# Patient Record
Sex: MALE | Race: BLACK OR AFRICAN AMERICAN | Employment: FULL TIME | URBAN - METROPOLITAN AREA
[De-identification: names, ages, dates, MRNs, and addresses within clinical notes are randomized per-mention and may not be internally consistent; named-entity substitution may affect disease eponyms.]

---

## 2018-06-26 ENCOUNTER — HOSPITAL ENCOUNTER (OUTPATIENT)
Dept: SURGERY | Age: 48
Discharge: HOME | End: 2018-06-26

## 2018-06-28 VITALS — BODY MASS INDEX: 36.65 KG/M2 | WEIGHT: 220 LBS | HEIGHT: 65 IN

## 2018-06-28 RX ORDER — LOSARTAN POTASSIUM AND HYDROCHLOROTHIAZIDE 12.5; 1 MG/1; MG/1
1 TABLET ORAL DAILY
COMMUNITY

## 2018-06-28 NOTE — PERIOP NOTES
Pt returned call . Reports still at work, requests RN call his wife to obtain medication information. This RN called pt's wife Grace Felix). Per wife pt takes Losartan/Hctz 100/12.5 daily for blood pressure control. Instructed wife to have pt continue medication up until the DOS- hold losartan/hctz DOS.

## 2018-06-28 NOTE — PERIOP NOTES
Patient verified name, , and surgery as listed in [facility]. Type 1B surgery, PAT phone assessment complete. Orders were received. Labs per surgeon: none  Labs per anesthesia protocol: none     Pt is at work and will call this afternoon with the name of his blood pressure medication. He is unsure of the name of medication. Patient answered medical/surgical history questions at their best of ability. All prior to admission medications documented in [facility]. Patient instructed to take the following medications the day of surgery according to anesthesia guidelines with a small sip of water: none . Hold all vitamins 7 days prior to surgery and NSAIDS 5 days prior to surgery. Medications to be held- none. Patient instructed on the following:  Arrive at A Entrance, time of arrival to be called the day before by 1700  NPO after midnight including gum, mints, and ice chips  Responsible adult must drive patient to the hospital, stay during surgery, and patient will need supervision 24 hours after anesthesia  Use antibacterial soap in shower the night before surgery and on the morning of surgery  Leave all valuables (money and jewelry) at home but bring insurance card and ID on  DOS  Do not wear make-up, nail polish, lotions, cologne, perfumes, powders, or oil on skin. Patient teach back successful and patient demonstrates knowledge of instruction.

## 2018-06-28 NOTE — PERIOP NOTES
Called pt back and instructed him that he needs to come to out pt lab prior to DOS to have lab work (K+) prior to surgery.

## 2018-06-29 ENCOUNTER — HOSPITAL ENCOUNTER (OUTPATIENT)
Dept: LAB | Age: 48
Discharge: HOME | End: 2018-06-29
Attending: ORTHOPAEDIC SURGERY
Payer: COMMERCIAL

## 2018-06-29 ENCOUNTER — ANESTHESIA EVENT (OUTPATIENT)
Dept: SURGERY | Age: 48
End: 2018-06-29
Payer: COMMERCIAL

## 2018-06-29 LAB — POTASSIUM SERPL-SCNC: 4.3 MMOL/L (ref 3.5–5.1)

## 2018-06-29 PROCEDURE — 84132 ASSAY OF SERUM POTASSIUM: CPT | Performed by: ANESTHESIOLOGY

## 2018-06-29 PROCEDURE — 36415 COLL VENOUS BLD VENIPUNCTURE: CPT | Performed by: ANESTHESIOLOGY

## 2018-06-29 NOTE — PERIOP NOTES
Lab results within limits per anesthesia protocol; OK for surgery.      Recent Results (from the past 12 hour(s))   POTASSIUM    Collection Time: 06/29/18  3:20 PM   Result Value Ref Range    Potassium 4.3 3.5 - 5.1 mmol/L

## 2018-07-02 ENCOUNTER — APPOINTMENT (OUTPATIENT)
Dept: GENERAL RADIOLOGY | Age: 48
End: 2018-07-02
Attending: ORTHOPAEDIC SURGERY
Payer: COMMERCIAL

## 2018-07-02 ENCOUNTER — ANESTHESIA (OUTPATIENT)
Dept: SURGERY | Age: 48
End: 2018-07-02
Payer: COMMERCIAL

## 2018-07-02 ENCOUNTER — HOSPITAL ENCOUNTER (OUTPATIENT)
Age: 48
Setting detail: OUTPATIENT SURGERY
Discharge: HOME | End: 2018-07-02
Attending: ORTHOPAEDIC SURGERY | Admitting: ORTHOPAEDIC SURGERY
Payer: COMMERCIAL

## 2018-07-02 VITALS
OXYGEN SATURATION: 95 % | SYSTOLIC BLOOD PRESSURE: 136 MMHG | RESPIRATION RATE: 16 BRPM | WEIGHT: 216.6 LBS | DIASTOLIC BLOOD PRESSURE: 72 MMHG | TEMPERATURE: 98.2 F | BODY MASS INDEX: 36.61 KG/M2 | HEART RATE: 75 BPM

## 2018-07-02 DIAGNOSIS — Z41.9 SURGERY, ELECTIVE: ICD-10-CM

## 2018-07-02 PROCEDURE — 73610 X-RAY EXAM OF ANKLE: CPT

## 2018-07-02 PROCEDURE — 77030011640 HC PAD GRND REM COVD -A: Performed by: ORTHOPAEDIC SURGERY

## 2018-07-02 PROCEDURE — 77030020782 HC GWN BAIR PAWS FLX 3M -B: Performed by: ANESTHESIOLOGY

## 2018-07-02 PROCEDURE — 77030021122 HC SPLNT MAT FST BSNM -A: Performed by: ORTHOPAEDIC SURGERY

## 2018-07-02 PROCEDURE — 77030035597 HC GRFT BN ALLGRFT CNRM FLX CUBE MUSC -H: Performed by: ORTHOPAEDIC SURGERY

## 2018-07-02 PROCEDURE — 77030019945 HC PDNG CST 3M -A: Performed by: ORTHOPAEDIC SURGERY

## 2018-07-02 PROCEDURE — 74011250636 HC RX REV CODE- 250/636: Performed by: ORTHOPAEDIC SURGERY

## 2018-07-02 PROCEDURE — 77030020143 HC AIRWY LARYN INTUB CGAS -A: Performed by: ANESTHESIOLOGY

## 2018-07-02 PROCEDURE — 76210000016 HC OR PH I REC 1 TO 1.5 HR: Performed by: ORTHOPAEDIC SURGERY

## 2018-07-02 PROCEDURE — C1769 GUIDE WIRE: HCPCS | Performed by: ORTHOPAEDIC SURGERY

## 2018-07-02 PROCEDURE — 74011250636 HC RX REV CODE- 250/636

## 2018-07-02 PROCEDURE — 74011250636 HC RX REV CODE- 250/636: Performed by: ANESTHESIOLOGY

## 2018-07-02 PROCEDURE — 77030006788 HC BLD SAW OSC STRY -B: Performed by: ORTHOPAEDIC SURGERY

## 2018-07-02 PROCEDURE — 77030020753 HC CUF TRNQT 1BLA STRY -B: Performed by: ORTHOPAEDIC SURGERY

## 2018-07-02 PROCEDURE — 77030018836 HC SOL IRR NACL ICUM -A: Performed by: ORTHOPAEDIC SURGERY

## 2018-07-02 PROCEDURE — 77030003602 HC NDL NRV BLK BBMI -B: Performed by: ANESTHESIOLOGY

## 2018-07-02 PROCEDURE — C1713 ANCHOR/SCREW BN/BN,TIS/BN: HCPCS | Performed by: ORTHOPAEDIC SURGERY

## 2018-07-02 PROCEDURE — 77030031401: Performed by: ORTHOPAEDIC SURGERY

## 2018-07-02 PROCEDURE — 77030031139 HC SUT VCRL2 J&J -A: Performed by: ORTHOPAEDIC SURGERY

## 2018-07-02 PROCEDURE — 76010000172 HC OR TIME 2.5 TO 3 HR INTENSV-TIER 1: Performed by: ORTHOPAEDIC SURGERY

## 2018-07-02 PROCEDURE — 76060000036 HC ANESTHESIA 2.5 TO 3 HR: Performed by: ORTHOPAEDIC SURGERY

## 2018-07-02 PROCEDURE — 77030008299 HC SPLNT FBRGLS SCTCH 3M -B: Performed by: ORTHOPAEDIC SURGERY

## 2018-07-02 PROCEDURE — 74011250637 HC RX REV CODE- 250/637: Performed by: ANESTHESIOLOGY

## 2018-07-02 PROCEDURE — 77030002916 HC SUT ETHLN J&J -A: Performed by: ORTHOPAEDIC SURGERY

## 2018-07-02 PROCEDURE — 77030002933 HC SUT MCRYL J&J -A: Performed by: ORTHOPAEDIC SURGERY

## 2018-07-02 PROCEDURE — 74011000250 HC RX REV CODE- 250

## 2018-07-02 DEVICE — IMPLANTABLE DEVICE: Type: IMPLANTABLE DEVICE | Site: FOOT | Status: FUNCTIONAL

## 2018-07-02 DEVICE — 4.5MM TI HEADLESS COMPRESSION SCREW/SHORT THREAD/36MM: Type: IMPLANTABLE DEVICE | Site: FOOT | Status: FUNCTIONAL

## 2018-07-02 DEVICE — KIT STPL BNE FIX BRDG L20MM SPEEDTITAN BME: Type: IMPLANTABLE DEVICE | Site: FOOT | Status: FUNCTIONAL

## 2018-07-02 DEVICE — SUBSTITUTE BONE GRFT 40X20X5MM DEMIN CANC FIL MTRX CNFRM FLX: Type: IMPLANTABLE DEVICE | Site: FOOT | Status: FUNCTIONAL

## 2018-07-02 RX ORDER — PROPOFOL 10 MG/ML
INJECTION, EMULSION INTRAVENOUS AS NEEDED
Status: DISCONTINUED | OUTPATIENT
Start: 2018-07-02 | End: 2018-07-02 | Stop reason: HOSPADM

## 2018-07-02 RX ORDER — MIDAZOLAM HYDROCHLORIDE 1 MG/ML
2 INJECTION, SOLUTION INTRAMUSCULAR; INTRAVENOUS
Status: DISCONTINUED | OUTPATIENT
Start: 2018-07-02 | End: 2018-07-02 | Stop reason: HOSPADM

## 2018-07-02 RX ORDER — ROPIVACAINE HYDROCHLORIDE 2 MG/ML
INJECTION, SOLUTION EPIDURAL; INFILTRATION; PERINEURAL AS NEEDED
Status: DISCONTINUED | OUTPATIENT
Start: 2018-07-02 | End: 2018-07-02 | Stop reason: HOSPADM

## 2018-07-02 RX ORDER — CEFAZOLIN SODIUM/WATER 2 G/20 ML
2 SYRINGE (ML) INTRAVENOUS ONCE
Status: COMPLETED | OUTPATIENT
Start: 2018-07-02 | End: 2018-07-02

## 2018-07-02 RX ORDER — OXYCODONE HYDROCHLORIDE 5 MG/1
5 TABLET ORAL
Status: DISCONTINUED | OUTPATIENT
Start: 2018-07-02 | End: 2018-07-02 | Stop reason: HOSPADM

## 2018-07-02 RX ORDER — MIDAZOLAM HYDROCHLORIDE 1 MG/ML
5 INJECTION, SOLUTION INTRAMUSCULAR; INTRAVENOUS ONCE
Status: COMPLETED | OUTPATIENT
Start: 2018-07-02 | End: 2018-07-02

## 2018-07-02 RX ORDER — HYDROMORPHONE HYDROCHLORIDE 2 MG/ML
0.5 INJECTION, SOLUTION INTRAMUSCULAR; INTRAVENOUS; SUBCUTANEOUS
Status: DISCONTINUED | OUTPATIENT
Start: 2018-07-02 | End: 2018-07-02 | Stop reason: HOSPADM

## 2018-07-02 RX ORDER — SODIUM CHLORIDE, SODIUM LACTATE, POTASSIUM CHLORIDE, CALCIUM CHLORIDE 600; 310; 30; 20 MG/100ML; MG/100ML; MG/100ML; MG/100ML
75 INJECTION, SOLUTION INTRAVENOUS CONTINUOUS
Status: DISCONTINUED | OUTPATIENT
Start: 2018-07-02 | End: 2018-07-02 | Stop reason: HOSPADM

## 2018-07-02 RX ORDER — FAMOTIDINE 20 MG/1
20 TABLET, FILM COATED ORAL ONCE
Status: COMPLETED | OUTPATIENT
Start: 2018-07-02 | End: 2018-07-02

## 2018-07-02 RX ORDER — FENTANYL CITRATE 50 UG/ML
INJECTION, SOLUTION INTRAMUSCULAR; INTRAVENOUS AS NEEDED
Status: DISCONTINUED | OUTPATIENT
Start: 2018-07-02 | End: 2018-07-02 | Stop reason: HOSPADM

## 2018-07-02 RX ORDER — DEXAMETHASONE SODIUM PHOSPHATE 4 MG/ML
INJECTION, SOLUTION INTRA-ARTICULAR; INTRALESIONAL; INTRAMUSCULAR; INTRAVENOUS; SOFT TISSUE AS NEEDED
Status: DISCONTINUED | OUTPATIENT
Start: 2018-07-02 | End: 2018-07-02 | Stop reason: HOSPADM

## 2018-07-02 RX ORDER — LIDOCAINE HYDROCHLORIDE 20 MG/ML
INJECTION, SOLUTION EPIDURAL; INFILTRATION; INTRACAUDAL; PERINEURAL AS NEEDED
Status: DISCONTINUED | OUTPATIENT
Start: 2018-07-02 | End: 2018-07-02 | Stop reason: HOSPADM

## 2018-07-02 RX ORDER — ONDANSETRON 2 MG/ML
INJECTION INTRAMUSCULAR; INTRAVENOUS AS NEEDED
Status: DISCONTINUED | OUTPATIENT
Start: 2018-07-02 | End: 2018-07-02 | Stop reason: HOSPADM

## 2018-07-02 RX ORDER — CEFAZOLIN SODIUM 1 G/3ML
INJECTION, POWDER, FOR SOLUTION INTRAMUSCULAR; INTRAVENOUS AS NEEDED
Status: DISCONTINUED | OUTPATIENT
Start: 2018-07-02 | End: 2018-07-02 | Stop reason: HOSPADM

## 2018-07-02 RX ORDER — HYDROCODONE BITARTRATE AND ACETAMINOPHEN 5; 325 MG/1; MG/1
2 TABLET ORAL AS NEEDED
Status: DISCONTINUED | OUTPATIENT
Start: 2018-07-02 | End: 2018-07-02 | Stop reason: HOSPADM

## 2018-07-02 RX ORDER — LIDOCAINE HYDROCHLORIDE 10 MG/ML
0.1 INJECTION INFILTRATION; PERINEURAL AS NEEDED
Status: DISCONTINUED | OUTPATIENT
Start: 2018-07-02 | End: 2018-07-02 | Stop reason: HOSPADM

## 2018-07-02 RX ORDER — FENTANYL CITRATE 50 UG/ML
100 INJECTION, SOLUTION INTRAMUSCULAR; INTRAVENOUS ONCE
Status: DISCONTINUED | OUTPATIENT
Start: 2018-07-02 | End: 2018-07-02 | Stop reason: HOSPADM

## 2018-07-02 RX ADMIN — FENTANYL CITRATE 50 MCG: 50 INJECTION, SOLUTION INTRAMUSCULAR; INTRAVENOUS at 14:24

## 2018-07-02 RX ADMIN — ONDANSETRON 4 MG: 2 INJECTION INTRAMUSCULAR; INTRAVENOUS at 14:11

## 2018-07-02 RX ADMIN — MIDAZOLAM 2 MG: 1 INJECTION INTRAMUSCULAR; INTRAVENOUS at 12:48

## 2018-07-02 RX ADMIN — PROPOFOL 50 MG: 10 INJECTION, EMULSION INTRAVENOUS at 15:53

## 2018-07-02 RX ADMIN — CEFAZOLIN SODIUM 1 G: 1 INJECTION, POWDER, FOR SOLUTION INTRAMUSCULAR; INTRAVENOUS at 16:24

## 2018-07-02 RX ADMIN — FENTANYL CITRATE 25 MCG: 50 INJECTION, SOLUTION INTRAMUSCULAR; INTRAVENOUS at 15:15

## 2018-07-02 RX ADMIN — PROPOFOL 50 MG: 10 INJECTION, EMULSION INTRAVENOUS at 16:21

## 2018-07-02 RX ADMIN — FENTANYL CITRATE 25 MCG: 50 INJECTION, SOLUTION INTRAMUSCULAR; INTRAVENOUS at 15:42

## 2018-07-02 RX ADMIN — LIDOCAINE HYDROCHLORIDE 100 MG: 20 INJECTION, SOLUTION EPIDURAL; INFILTRATION; INTRACAUDAL; PERINEURAL at 14:03

## 2018-07-02 RX ADMIN — ROPIVACAINE HYDROCHLORIDE 30 ML: 2 INJECTION, SOLUTION EPIDURAL; INFILTRATION; PERINEURAL at 12:48

## 2018-07-02 RX ADMIN — SODIUM CHLORIDE, SODIUM LACTATE, POTASSIUM CHLORIDE, AND CALCIUM CHLORIDE 75 ML/HR: 600; 310; 30; 20 INJECTION, SOLUTION INTRAVENOUS at 11:46

## 2018-07-02 RX ADMIN — SODIUM CHLORIDE, SODIUM LACTATE, POTASSIUM CHLORIDE, AND CALCIUM CHLORIDE: 600; 310; 30; 20 INJECTION, SOLUTION INTRAVENOUS at 15:52

## 2018-07-02 RX ADMIN — FAMOTIDINE 20 MG: 20 TABLET, FILM COATED ORAL at 11:44

## 2018-07-02 RX ADMIN — CEFAZOLIN SODIUM 2 G: 1 INJECTION, POWDER, FOR SOLUTION INTRAMUSCULAR; INTRAVENOUS at 14:00

## 2018-07-02 RX ADMIN — DEXAMETHASONE SODIUM PHOSPHATE 10 MG: 4 INJECTION, SOLUTION INTRA-ARTICULAR; INTRALESIONAL; INTRAMUSCULAR; INTRAVENOUS; SOFT TISSUE at 14:11

## 2018-07-02 RX ADMIN — PROPOFOL 150 MG: 10 INJECTION, EMULSION INTRAVENOUS at 14:03

## 2018-07-02 RX ADMIN — Medication 2 G: at 11:45

## 2018-07-02 NOTE — H&P
Outpatient Surgery History and Physical:  Janee Ireland     CHIEF COMPLAINT:   LE    PE:     Visit Vitals    BP (!) 147/92    Pulse (!) 59    Temp 97.8 °F (36.6 °C)    Resp 16    Wt 98.2 kg (216 lb 9.6 oz)    SpO2 100%    BMI 36.61 kg/m2       Heart:  No noted problems   Lungs:  No noted problems        Past Medical History: There are no active problems to display for this patient. Surgical History:   Past Surgical History:   Procedure Laterality Date    HX COLONOSCOPY      HX HERNIA REPAIR  1998       Social History: Patient  reports that he has never smoked. He has never used smokeless tobacco. He reports that he does not drink alcohol or use illicit drugs. Family History:   Family History   Problem Relation Age of Onset   [facility] Cancer Mother     Hypertension Mother     Diabetes Mother    [facility] Father     Hypertension Father        Allergies: Reviewed per EMR  No Known Allergies    Medications:    No current facility-administered medications on file prior to encounter. No current outpatient prescriptions on file prior to encounter. The surgery is planned for the LE. History and physical have been reviewed. There have been no significant  changes since the completion of the updated history and physical.    Necessity for the procedure/care is still present and the updated history and physical office notes outline the full long term history of the problem. Please see the updated office notes for the full musculoskeletal examination and surgical plan.     Signed By: Mikal Robbins MD     July 2, 2018 1:14 PM

## 2018-07-02 NOTE — PROGRESS NOTES
's Pre-op visit requested by patient. Conveyed care and concern for patient and family. Offered prayer as requested for patient, family, and staff.     Rachel Morrow MDiv, BS  Board Certified

## 2018-07-02 NOTE — IP AVS SNAPSHOT
Josy Joy 
 
 
 [address] [place] [address] 
[phone] Patient: Dozier Dakins MRN: [id number] About your hospitalization You were admitted on:  July 2, 2018 You last received care in the:  [address] You were discharged on:  July 2, 2018 Why you were hospitalized Your primary diagnosis was:  Not on File Follow-up Information Follow up With Details Comments Contact Info Juliana Kerr MD  Follow Up as previously scheduled [address] [address] 
[phone] Discharge Orders None A check cyrus indicates which time of day the medication should be taken. My Medications ASK your doctor about these medications Instructions Each Dose to Equal  
 Morning Noon Evening Bedtime  
 losartan-hydroCHLOROthiazide 100-12.5 mg per tablet Commonly known as:  HYZAAR Your last dose was: Your next dose is: Take 1 Tab by mouth daily. Indications: hypertension 1 Tab Discharge Instructions INSTRUCTIONS FOLLOWING FOOT AND ANKLE SURGERY Dr. Delia Leos office number: [phone] ACTIVITY  Elevate feet to help with swelling.  Get out of bed frequently; while in bed, move your legs as much as possible.  Non weight bearing on surgical extremity until OK with Dr. Aury Lee to bear 
  weight.  Use crutches as directed by your doctor. DIET  Clear liquids until no nausea or vomiting; then light diet for the first day  Advance to regular diet on second day, unless your doctor orders otherwise. PAIN 
 Take pain medication as directed by your doctor.  Call your doctor if pain is NOT relieved by medication.  Some people have a medical condition that doesn't allow them to take Tylenol.      
   If you are able to take Tylenol and the pain medication prescribed by   
 Mike has NO Tylenol in it, please follow the schedule listed below until all of  
   your felling returns from the anesthesia block.   Start by taking 1 or 2 regular strength Tylenol pills - NO \"Tylenol PM\"   3 hours later, take 1 or 2 pain pills prescribed by Dr. Salbador Duval (as long as it  
           has NO Tylenol in it)   3 hours later, take 1 or 2 regular strength Tylenol pills - NO \"Tylenol PM\"   3 hours later, take 1 or 2 pain pills prescribed by Dr. Salbador Duval DRESSING CARE 
 Keep dressing or splint clean, dry, and intact. FOLLOW-UP PHONE CALLS  Calls will be made by nursing staff.  If you have any problems, call your doctor as needed. CALL YOUR DOCTOR IF 
 Excessive bleeding that does not stop after holding mild pressure over the area  Temperature of 101°F or above  Redness, excessive swelling or bruising, andlor green or yellow, smelly discharge from  
   incision  Loss of sensation  cold, white, or blue toes AFTER ANESTHESIA  For the first 24 hours: DO NOT Drive, Drink alcoholic beverages, or Make important  
  decisions.  Be aware of dizziness following anesthesia and while taking pain medication. MEDICATIONS: 
 Take one 325 mg Aspirin each day, if OK with your primary care physician and  
   you do not have a history (past or present) of GI ulcers. Introducing [facility] [place] [facility]! [employer] introduces Freta.lÃ¡ patient portal. Now you can access parts of your medical record, email your doctor's office, and request medication refills online. 1. In your internet browser, go to [email or web address] 2. Click on the First Time User? Click Here link in the Sign In box. You will see the New Member Sign Up page. 3. Enter your Freta.lÃ¡ Access Code exactly as it appears below. You will not need to use this code after youve completed the sign-up process.  If you do not sign up before the expiration date, you must request a new code. · intelworks Access Code: [email or web address] Expires: 9/5/2018  6:27 AM 
 
4. Enter the last four digits of your Social Security Number (xxxx) and Date of Birth (mm/dd/yyyy) as indicated and click Submit. You will be taken to the next sign-up page. 5. Create a intelworks ID. This will be your intelworks login ID and cannot be changed, so think of one that is secure and easy to remember. 6. Create a intelworks password. You can change your password at any time. 7. Enter your Password Reset Question and Answer. This can be used at a later time if you forget your password. 8. Enter your e-mail address. You will receive e-mail notification when new information is available in [address] 9. Click Sign Up. You can now view and download portions of your medical record. 10. Click the Download Summary menu link to download a portable copy of your medical information. If you have questions, please visit the Frequently Asked Questions section of the intelworks website. Remember, intelworks is NOT to be used for urgent needs. For medical emergencies, dial 911. Now available from your iPhone and Android! Introducing Ulises Lopez As a [facility] patient, I wanted to make you aware of our electronic visit tool called Ulises Rakeshconstancealice. [facility] 24/7 allows you to connect within minutes with a medical provider 24 hours a day, seven days a week via a mobile device or tablet or logging into a secure website from your computer. You can access Ulises Lopez from anywhere in the [place].  
 
A virtual visit might be right for you when you have a simple condition and feel like you just dont want to get out of bed, or cant get away from work for an appointment, when your regular [facility] provider is not available (evenings, weekends or holidays), or when youre out of town and need minor care. Electronic visits cost only $49 and if the [facility] 24/7 provider determines a prescription is needed to treat your condition, one can be electronically transmitted to a nearby pharmacy*. Please take a moment to enroll today if you have not already done so. The enrollment process is free and takes just a few minutes. To enroll, please download the [facility] 24/Handprint kayla to your tablet or phone, or visit [email or web address] to enroll on your computer. And, as an [address] [place] [address] patient with a [employer] account, the results of your visits will be scanned into your electronic medical record and your primary care provider will be able to view the scanned results. We urge you to continue to see your regular [facility] provider for your ongoing medical care. And while your primary care provider may not be the one available when you seek a [employer] virtual visit, the peace of mind you get from getting a real diagnosis real time can be priceless. For more information on Modify, view our Frequently Asked Questions (FAQs) at [email or web address]. Sincerely, 
 
Edie Church MD 
Chief Medical Officer [address] *:  certain medications cannot be prescribed via Modify Providers Seen During Your Hospitalization Provider Specialty Primary office phone Jaun Rawls MD Orthopedic Surgery [phone] Your Primary Care Physician (PCP) Primary Care Physician Office Phone Office Fax NOT ON FILE ** None ** ** None ** You are allergic to the following No active allergies Recent Documentation Weight BMI Smoking Status 98.2 kg 36.61 kg/m2 Never Smoker Emergency Contacts Name Discharge Info Relation Home Work Mobile Olga Hussein DISCHARGE CAREGIVER [3] Spouse [3] [phone] Patient Belongings The following personal items are in your possession at time of discharge: 
  Dental Appliances: Partials Please provide this summary of care documentation to your next provider. Signatures-by signing, you are acknowledging that this After Visit Summary has been reviewed with you and you have received a copy. Patient Signature:  ____________________________________________________________ Date:  ____________________________________________________________  
  
Rosalia Darien Provider Signature:  ____________________________________________________________ Date:  ____________________________________________________________

## 2018-07-02 NOTE — ANESTHESIA PROCEDURE NOTES
Peripheral Block    Start time: 7/2/2018 12:38 PM  End time: 7/2/2018 12:45 PM  Performed by: Chapin Crooks  Authorized by: Chapin Crooks       Pre-procedure:    Indications: at surgeon's request and post-op pain management    Preanesthetic Checklist: patient identified, risks and benefits discussed, site marked, timeout performed, anesthesia consent given and patient being monitored    Timeout Time: 12:38          Block Type:   Block Type:  Popliteal  Laterality:  Left  Monitoring:  Continuous pulse ox, frequent vital sign checks, heart rate, oxygen and responsive to questions  Injection Technique:  Single shot  Procedures: ultrasound guided    Prep: chlorhexidine    Location:  Lower thigh  Needle Type:  Stimuplex  Needle Gauge:  20 G  Needle Localization:  Ultrasound guidance  Medication Injected:  0.5%  ropivacaine  Volume (mL):  22    Assessment:  Number of attempts:  1  Injection Assessment:  Incremental injection every 5 mL, local visualized surrounding nerve on ultrasound, negative aspiration for blood, no intravascular symptoms, no paresthesia and ultrasound image on chart  Patient tolerance:  Patient tolerated the procedure well with no immediate complications

## 2018-07-02 NOTE — ANESTHESIA POSTPROCEDURE EVALUATION
Post-Anesthesia Evaluation and Assessment    Patient: Janee Ireland MRN: [id number]  SSN: [id number]    YOB: 1970  Age: 46 y.o. Sex: male       Cardiovascular Function/Vital Signs  Visit Vitals    /76 (BP 1 Location: Right arm, BP Patient Position: At rest)    Pulse 76    Temp 36.8 °C (98.2 °F)    Resp 16    Wt 98.2 kg (216 lb 9.6 oz)    SpO2 96%    BMI 36.61 kg/m2       Patient is status post No value filed. anesthesia for Procedure(s):  LEFT ANKLE EXOSTECTOMY  LEFT ANKLE ARTHRODESIS TRIPLE  LEFT ACHILLES LENGTHENING. Nausea/Vomiting: None    Postoperative hydration reviewed and adequate. Pain:  Pain Scale 1: Visual (07/02/18 1637)  Pain Intensity 1: 0 (07/02/18 1637)   Managed    Neurological Status:   Neuro (WDL): Exceptions to WDL (07/02/18 1637)  Neuro  Neurologic State: Drowsy (07/02/18 1637)  Orientation Level: Oriented to person (07/02/18 1637)  Cognition: Follows commands (07/02/18 1637)   At baseline    Mental Status and Level of Consciousness: Arousable    Pulmonary Status:   O2 Device: Nasal cannula (07/02/18 1643)   Adequate oxygenation and airway patent    Complications related to anesthesia: None    Post-anesthesia assessment completed.  No concerns    Signed By: Jacy Lin MD     July 2, 2018

## 2018-07-02 NOTE — ANESTHESIA PREPROCEDURE EVALUATION
Anesthetic History   No history of anesthetic complications            Review of Systems / Medical History  Patient summary reviewed and pertinent labs reviewed    Pulmonary  Within defined limits                 Neuro/Psych   Within defined limits           Cardiovascular    Hypertension                   GI/Hepatic/Renal                Endo/Other        Obesity     Other Findings              Physical Exam    Airway  Mallampati: III  TM Distance: 4 - 6 cm  Neck ROM: normal range of motion   Mouth opening: Normal     Cardiovascular    Rhythm: regular  Rate: normal      Pertinent negatives: No murmur, JVD and peripheral edema   Dental  No notable dental hx       Pulmonary  Breath sounds clear to auscultation               Abdominal         Other Findings            Anesthetic Plan    ASA: 2  Anesthesia type: general      Post-op pain plan if not by surgeon: peripheral nerve block single    Induction: Intravenous  Anesthetic plan and risks discussed with: Patient

## 2018-07-02 NOTE — ANESTHESIA PROCEDURE NOTES
Peripheral Block    Start time: 7/2/2018 12:45 PM  End time: 7/2/2018 12:48 PM  Performed by: Marilin Phipps  Authorized by: Marilin Phipps       Pre-procedure: Indications: at surgeon's request and post-op pain management    Preanesthetic Checklist: patient identified, risks and benefits discussed, site marked, timeout performed, anesthesia consent given and patient being monitored    Timeout Time: 12:38          Block Type:   Block Type:   Adductor canal  Laterality:  Left  Monitoring:  Continuous pulse ox, frequent vital sign checks, heart rate, oxygen and responsive to questions  Injection Technique:  Single shot  Procedures: ultrasound guided    Patient Position: supine  Prep: chlorhexidine    Location:  Mid thigh  Needle Gauge:  20 G  Needle Localization:  Ultrasound guidance  Medication Injected:  0.5%  ropivacaine  Volume (mL):  8    Assessment:  Number of attempts:  1  Injection Assessment:  Incremental injection every 5 mL, local visualized surrounding nerve on ultrasound, negative aspiration for blood, no intravascular symptoms, no paresthesia and ultrasound image on chart  Patient tolerance:  Patient tolerated the procedure well with no immediate complications

## 2018-07-02 NOTE — DISCHARGE INSTRUCTIONS
Andry Crump office number: [phone]      ACTIVITY   Elevate feet to help with swelling.  Get out of bed frequently; while in bed, move your legs as much as possible.  Non weight bearing on surgical extremity until OK with Dr. Hoa Roth to bear    weight.  Use crutches as directed by your doctor. DIET   Clear liquids until no nausea or vomiting; then light diet for the first day   Advance to regular diet on second day, unless your doctor orders otherwise. PAIN   Take pain medication as directed by your doctor.  Call your doctor if pain is NOT relieved by medication.  Some people have a medical condition that doesn't allow them to take Tylenol. If you are able to take Tylenol and the pain medication prescribed by Dr. Hoa Roth has NO Tylenol in it, please follow the schedule listed below until all of      your felling returns from the anesthesia block.   Start by taking 1 or 2 regular strength Tylenol pills - NO \"Tylenol PM\"           3 hours later, take 1 or 2 pain pills prescribed by Dr. Hoa Roth (as long as it              has NO Tylenol in it)           3 hours later, take 1 or 2 regular strength Tylenol pills - NO \"Tylenol PM\"            3 hours later, take 1 or 2 pain pills prescribed by Dr. Amber English dressing or splint clean, dry, and intact. FOLLOW-UP PHONE [address] will be made by nursing staff.  If you have any problems, call your doctor as needed. CALL YOUR DOCTOR IF   Excessive bleeding that does not stop after holding mild pressure over the area   Temperature of 101°F or above   Redness, excessive swelling or bruising, andlor green or yellow, smelly discharge from      incision   Loss of sensation -- cold, white, or blue toes    AFTER ANESTHESIA   For the first 24 hours: DO NOT Drive, Drink alcoholic beverages, or Make important     decisions.    Be aware of dizziness following anesthesia and while taking pain medication. MEDICATIONS:   Take one 325 mg Aspirin each day, if OK with your primary care physician and      you do not have a history (past or present) of GI ulcers.

## 2018-07-03 NOTE — OP NOTES
[facility] REPORT    Pricila Barillas  MR#: [id number]  : 1970  ACCOUNT #: [de-identified]   DATE OF SERVICE: 2018    PREOPERATIVE DIAGNOSIS:  1. Left ankle impingement. 2.  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 PREOPERATIVE DIAGNOSIS:  1. Left ankle impingement. 2.  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 PROCEDURES PERFORMED:  1. Left ankle exostectomy. 2.  Left triple arthrodesis, subtalar joint malunion osteotomy. 3.  Left Achilles lengthening. SURGEON:  Ash Webber. MD Mike    ANESTHESIA:  Regional.    FLUIDS:  Crystalloid. ESTIMATED BLOOD LOSS:  Minimal.    SPECIMENS REMOVED:  None. DRAINS:  None. COMPLICATIONS:  None. TOURNIQUET TIME:  Approximately 2 hours. 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 His wife states that he broke his ankle approximately 20 years ago and never had treatment. I suspect that he must have had a subtalar joint fracture and autofused in significant valgus. He had eroded away his entire lateral talar process, making fixation with any type of screw difficult. I elected not to go through the plantar aspect and just hold him with a K-wire with fixation through the talonavicular and calcaneocuboid joints. The entire area was rigid until I was able to osteotomize through his old malunion and then free up the entire hindfoot. SURGERY IN DETAIL:  After successful induction of regional and general anesthesia, left leg was scrubbed, prepped and draped in usual sterile fashion. Antibiotic issued. Time-out procedure and identification and surgical markings were done by me. Through the triple hemisection release, I was able to do an Achilles lengthening. The hindfoot was then addressed with an osteotomy through the old area of the subtalar joint.   Then, at that point, he did have mobility and then I was able to free the entire hindfoot and calcaneonavicular and talonavicular joint regions. After resection of all involved arthritic areas, I was able to irrigate and drill the surfaces and stabilize with a K-wire through his eroded lateral talus and do a lateral column lengthening, holding it with a Synthes Conform Flex 40 x 20 x 5 mm bone graft. Calcaneocuboid joint stabilized with 2 BME staples and three 4.5 headless Synthes screws at the navicular joint. Wound thoroughly cleaned and then closed with Vicryl, Monocryl and Ethilon. The patient placed in a sterile dressing. He seemed to tolerate the procedure well. X-rays revealed a good position. Patient clinically also had good alignment. Patient placed in a sterile dressing and short leg splint.       MD CALVIN Dumont /   D: 07/02/2018 17:16     T: 07/02/2018 17:54  JOB #: [id number]

## 2018-08-29 ENCOUNTER — APPOINTMENT (OUTPATIENT)
Dept: PHYSICAL THERAPY | Age: 48
End: 2018-08-29

## 2018-08-30 ENCOUNTER — HOSPITAL ENCOUNTER (OUTPATIENT)
Dept: PHYSICAL THERAPY | Age: 48
Discharge: HOME | End: 2018-08-30
Payer: COMMERCIAL

## 2018-08-30 PROCEDURE — 97161 PT EVAL LOW COMPLEX 20 MIN: CPT

## 2018-08-30 PROCEDURE — 97140 MANUAL THERAPY 1/> REGIONS: CPT

## 2018-08-30 PROCEDURE — 97110 THERAPEUTIC EXERCISES: CPT

## 2018-08-30 NOTE — PROGRESS NOTES
Ambulatory/Rehab Services H2 Model Falls Risk Assessment    Risk Factor Pts. ·   Confusion/Disorientation/Impulsivity  []    4 ·   Symptomatic Depression  []   2 ·   Altered Elimination  []   1 ·   Dizziness/Vertigo  []   1 ·   Gender (Male)  [x]   1 ·   Any administered antiepileptics (anticonvulsants):  []   2 ·   Any administered benzodiazepines:  []   1 ·   Visual Impairment (specify):  []   1 ·   Portable Oxygen Use  []   1 ·   Orthostatic ? BP  []   1 ·   History of Recent Falls (within 3 mos.)  []   5     Ability to Rise from Chair (choose one) Pts. ·   Ability to rise in a single movement  []   0 ·   Pushes up, successful in one attempt  [x]   1 ·   Multiple attempts, but successful  []   3 ·   Unable to rise without assistance  []   4   Total: (5 or greater = High Risk) 2     Falls Prevention Plan:   []                Physical Limitations to Exercise (specify):   []                Mobility Assistance Device (type):   []                Exercise/Equipment Adaptation (specify):    ©2010 [facility] [address] Patent #[id number].  Federal Law prohibits the replication, distribution or use without written permission from [facility]

## 2018-08-30 NOTE — THERAPY EVALUATION
Roz Rubinstein  : 1970      Payor: Bryan Quinteros / Plan: Wandy Marinelli RPN / Product Type: Commerical /  [id number] [facility] [address] [facility], [address], [place], [address] [place] [address]  [phone]   [phone]        OUTPATIENT PHYSICAL THERAPY:Initial Assessment and Treatment Day: Day of Assessment 2018    ICD-10: Treatment Diagnosis:   Stiffness of left ankle, not elsewhere classified (M25.672)  Other abnormalities of gait and mobility (R26.89)  Pain in left ankle and joints of left foot (M25.572)  Effusion, left ankle (M25.472)  Precautions/Allergies:   Review of patient's allergies indicates no known allergies. Fall Risk Score: 2 (? 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 Katharine Ling presents to physical therapy s/p Left ankle exostectomy, Left triple arthrodesis, subtalar joint malunion osteotomy, and Left Achilles lengthening  on 18. He demonstrates L ankle/foot effusion, stiffness, antalgic gait, decreased strength, ROM, joint mobility, and functional mobility limiting his ability to complete basic ADLs. These S/S are consistent with L ankle stiffness, effusion, and pain, and antalgic gait. Patient will benefit from skilled physical therapy for manual therapeutic techniques (as appropriate), therapeutic exercises and activities, neuromuscular re-education, balance training and comprehensive home exercises program to address current impairments and functional limitations.     Roz Rubinstein will benefit from skilled PT (medically necessary) in order to address above deficits affecting participation in basic ADLs and overall functional tolerance. PROBLEM LIST (Impacting functional limitations):   1. Decreased Strength  2. Decreased Joint mobility/ROM  3. Decreased ADL/Functional Activities  4. Decreased Transfer Abilities  5. Decreased Ambulation Ability/Technique  6. Decreased Balance  7. Increased Pain  8. Decreased Activity Tolerance  9. Decreased [place] with Home Exercise Program INTERVENTIONS PLANNED:   1. Balance Exercise  2. Bed Mobility  3. Cold  4. Family Education  5. Gait Training  6. Heat  7. Home Exercise Program (HEP)  8. Manual Therapy  9. Neuromuscular Re-education/Strengthening  10. Range of Motion (ROM)  11. Therapeutic Activites  12. Therapeutic Exercise/Strengthening  13. Transfer Training  14. Ultrasound (US)   TREATMENT PLAN:  Effective Dates: 8/30/18 TO 10/29/2018 (60 days). Frequency/Duration: 3 times a week for 60 Days  SHORT-TERM FUNCTIONAL GOALS: Time Frame: 4 weeks  Florina Downso will be compliant with home exercise program within 4 weeks in order to improve active participation with management of patient's symptoms and/or functional deficits. Renettae  will report <=3/10 pain with walking >15 minutes in order to participate in daily exercise and daily activities  Genice  will be able to stand >60 minutes with <2/10 pain to feet in order to participate in household duties without issues/compromise. Florina  will report being able to sleep at night without waking up secondary to foot/ankle pain. Florina   will improve ankle PF/DF active ROM from 32/0 to [place]  will improve ankle strength from 4/5 to 4+/5 to improve tolerance of ADLs. Florina  will be able to ascend/descend >8 steps with reciprocal pattern to demonstrate improvement in functional mobility at home. DISCHARGE GOALS: Time Frame: 8 weeks  Florina  will be independent with home exercise program within 8 weeks in order to improve independence with management of patient's symptoms and/or functional deficits. Wesley Blum will report no pain with walking affecting participation in activities of daily living. Wesley Blum will be able to stand >60 minutes without onset of foot pain. Wesley Blum will be report improved score on FAAM from 34/84 to >60/84 to improve functional independence. Rehabilitation Potential For Stated Goals: Good  Regarding Medina Hussein's therapy, I certify that the treatment plan above will be carried out by a therapist or under their direction. Thank you for this referral,  Josie Cannon     Referring Physician Signature: Mallory Hathaway MD              Date                    HISTORY:   History of Present Injury/Illness (Reason for Referral): Wesley Blum presents to physical therapy s/p L ankle/foot reconstruction approximately 8 weeks ago. He states he broke his ankle 20 years ago but never sought treatment for it. He states it healed \"crooked\" so he has been walking on the side of his foot (medial) and after a while it began to really hurt him. He states last year he sought care for it and was placed in a boot/cast for a while to re-align his foot, however that failed so he had it surgically reconstructed and referred to PT.     -Present symptoms/complaints (on day of evaluation)   Pain Scale:  · Current: 5/10  · Best: 1/10  · Worst: 6/10    · Aggravating factors: walking long distances, going up/down steps, squatting, raising up on toes,    · Relieving factors: rest      Past Medical History/Comorbidities:   Mr. Dennison Schwab  has a past medical history of Hypertension.   Mr. Dennison Schwab  has a past surgical history that includes hx hernia repair (1998) and hx colonoscopy., L ankle reconstruction (2018)    Active Ambulatory Problems     Diagnosis Date Noted    No Active Ambulatory Problems     Resolved Ambulatory Problems     Diagnosis Date Noted    No Resolved Ambulatory Problems     Past Medical History:   Diagnosis Date    Hypertension      Social History/Living Environment:        Social History     Social History    Marital status:      Spouse name: N/A    Number of children: N/A    Years of education: N/A     Occupational History    Not on file. Social History Main Topics    Smoking status: Never Smoker    Smokeless tobacco: Never Used    Alcohol use No    Drug use: No    Sexual activity: Not on file     Other Topics Concern    Not on file     Social History Narrative     Prior Level of Function/Work/Activity:  Not working  Hobbies: walking/travel  Dominant Side:         RIGHT  Other Clinical Tests  X-ray: healing good, per pt. Previous Treatment Approach   Corrective brace and surgery  Current Medications:    Current Outpatient Prescriptions:     losartan-hydroCHLOROthiazide (HYZAAR) 100-12.5 mg per tablet, Take 1 Tab by mouth daily. Indications: hypertension, Disp: , Rfl:      Date Last Reviewed:  8/30/2018   Number of Personal Factors/Comorbidities that affect the Plan of Care: 1-2: MODERATE COMPLEXITY   EXAMINATION:   Observation/Orthostatic Postural Assessment:  Assessed @ Initial Visit:      Gait Antalgic with no toe-off in terminal stance on L, foot flat, and L hip circumduction    Standing; primarily WB through R LE     Palpation:  Assessed @ Initial Visit: Tenderness to palpation along achilles T with restrictions noted. Tenderness and restriction through plantar fascia and talocrual joint.   Surgical scars present good healing, however restricted in mobility/pliability                     AROM/PROM         Joint: Eval Date: 8/30/18  Re-Assess Date:  Re-Assess Date:    Active ROM RIGHT LEFT RIGHT LEFT RIGHT LEFT   Ankle Dorsiflexion 5 0       Ankle Plantarflexion 45 32       Ankle Inversion 6 0       Ankle Eversion 4 8                         Passive ROM         Ankle Dorsiflexion  1       Ankle Plantarflexion  32       Ankle Inversion  0       Ankle Eversion  8         Strength:     Eval Date: 8/30/18  Re-Assess Date:  Re-Assess Date:      RIGHT LEFT RIGHT LEFT RIGHT LEFT   Ankle Dorsiflexion 5/5 4/5       Ankle Plantarflexion 5/5 4+/5       Ankle Inversion 5/5 4/5       Ankle Eversion 5/5 4/5       Knee Flexion 5/5 5/5       Knee Extension 5/5 5/5                  Joint Mobility Eval Date: 8/30/18  Re-Assess Date:  Re-Assess Date:     RIGHT LEFT RIGHT LEFT RIGHT LEFT   Subtalar  hypomobile       Talocrual  hypomobile          Special Tests: Assessed @ Initial Visit:     Neurological Screen: Assessed @ Initial Visit No radiating symptoms down leg    Functional Mobility:  Assessed @ Initial Visit Limited tolerance of walking and standing  Heel/Toe walking= unable  Calf Raise (Dbl/Single)= Dbl unable  Balance:  Assessed @ Initial Visit   Single leg stance: R= 1-5seconds  L= 0 seconds   Body Structures Involved:  1. Bones  2. Joints  3. Muscles  4. Ligaments Body Functions Affected:  1. Sensory/Pain  2. Neuromusculoskeletal  3. Movement Related Activities and Participation Affected:  1. Mobility  2. Self Care   Number of elements that affect the Plan of Care: 2-3: Moderate  COMPLEXITY   CLINICAL PRESENTATION:   Presentation: Stable and uncomplicated: LOW COMPLEXITY   CLINICAL DECISION MAKING:   Tool Used: FOOT AND ANKLE ABILITY MEASURE (FAAM)  Score:  Initial: 34/84 Most Recent: X (Date: -- )   Interpretation of Score: For the \"Activities of Daily Living\", there are 21 questions each scored on a 5 point scale with 0 representing \"Unable to do\" and 4 representing \"No difficulty\". The lower the score, the greater the functional disability. 84/84 represents no disability. Minimal detectable change is 5.7 points. With the addition of the 8 questions in the \"Sports Subscale,\" there are 29 questions, each scored on a 5 point scale with 0 representing \"Unable to do\" and 4 representing \"No difficulty\". The lower the score, the greater the functional disability. 116/116 represents no disability. Minimal detectable change is 12.3 points.     Activities of Daily Living:  Score 84 83-68 67-51 50-34 33-18 17-1 0   Modifier CH CI CJ CK CL CM CN     Activities of Daily Living + Sports Subscale:  Score 116 115-94 93-71 70-47 46-24 23-1 0   Modifier CH CI CJ CK CL CM CN     ? Mobility - Walking and Moving Around:     - CURRENT STATUS: CL - 60%-79% impaired, limited or restricted    - GOAL STATUS: CJ - 20%-39% impaired, limited or restricted    - D/C STATUS:  ---------------To be determined---------------      Medical Necessity:   · Patient is expected to demonstrate progress in strength, range of motion, balance, coordination and functional technique to increase independence with functional mobility at home to perform household duties and improve safety during ascending/descending stairs, walking on uneven ground, and squatting. .  Reason for Services/Other Comments:  · Patient requires manual therapeutic interventions as well as thereapeutic exercises/activities and gait training to improve patients saftey with ambulation in the community and functional mobility in and out of home. .   Use of outcome tool(s) and clinical judgement create a POC that gives a: Clear prediction of patient's progress: LOW COMPLEXITY   TREATMENT:   (In addition to Assessment/Re-Assessment sessions the following treatments were rendered)      · Pre-Treatment Pain/ Symptoms: See above report in Initial Assessment   5/10       THERAPEUTIC EXERCISE: (25 minutes):  Exercises per grid below to improve mobility, strength and balance. Required minimal visual and verbal cues to promote proper body alignment, promote proper body posture and promote proper body mechanics. Progressed resistance, range, repetitions and complexity of movement as indicated.      Date:  8/30/18 Date:   Date:     Exercise Parameters Parameters Parameters   Education POC, PT goals, HEP, ICE     Plantar fascia stretch 3x30\"     Calf stretch  Standing 5x10\"     Towel curls 30x     Seated calf raises 30x                    Med[employer] Portal    Therapeutic Activity: (** minutes): Activity Date:   Date:   Date:     Exercise Parameters Parameters Parameters                                               Neuromuscular Education:      Manual Therapy Interventions: (10 minutes): Manual interventions per grid below performed to improve joint/soft tissue mobility and ROM to improve ability to perform ADLs. Patient responded well to all manual interventions with no significant increase in pain/symptoms. Improved ankle/foot ROM demonstrated following interventions and decrease in pain scale listed below. Manual Intervention Date:  8/30/18 Date:   Date:     Soft tissue mobilization Joint Mobility Parameters Parameters Parameters    Talocrual A/P Grade III      Subtalar Med/lat grade III     Plantar fascia  Release                                   MODALITIES: (10 minutes):        *  Cold Pack Therapy in order to provide analgesia and reduce inflammation and edema. Location= L ankle  Skin intact pre and post treatment with no adverse symptoms    Treatment/Session Assessment:  Verbalized understanding of HEP and role of PT/POC. Difficulty with seated calf raises secondary to weakness. No increase in pain with manual interventions performed today. · Pain: Post Session report:  Pain: 4/10      GOOD ·   Compliance with Program/Exercises: Will assess as treatment progresses. · Recommendations/Intent for next treatment session: \"Next visit will focus on advancements to more challenging activities and reduction in assistance provided\".     Future Appointments  Date Time Provider Tay Aguayo   9/5/2018 9:30 AM [facility]   9/7/2018 9:45 AM Irais Price [facility]   9/10/2018 9:30 AM Bharathi Price [facility]   9/12/2018 9:45 AM [facility]   9/14/2018 9:30 AM Alix Huerta [facility]   9/17/2018 9:30 AM [facility]   9/19/2018 9:30 AM Lauren Samir [facility]   9/21/2018 9:30 AM Irais Campos [facility]   9/24/2018 9:30 AM [facility]is Campos [facility]   9/26/2018 9:30 AM [facility]is Campos [facility]   9/28/2018 9:30 AM Irais Campso [facility]           Total Treatment Duration: 50 mins; 15 mins evaluation, 15 mins therex, 10 mins manual, 10 mins modalities  PT Patient Time In/Time Out  Time In: 1534  Time Out: 2000 Conrad Pereira

## 2018-09-05 ENCOUNTER — HOSPITAL ENCOUNTER (OUTPATIENT)
Dept: PHYSICAL THERAPY | Age: 48
Discharge: HOME | End: 2018-09-05
Payer: COMMERCIAL

## 2018-09-05 PROCEDURE — 97140 MANUAL THERAPY 1/> REGIONS: CPT

## 2018-09-05 PROCEDURE — 97110 THERAPEUTIC EXERCISES: CPT

## 2018-09-05 PROCEDURE — 97530 THERAPEUTIC ACTIVITIES: CPT

## 2018-09-05 NOTE — THERAPY EVALUATION
Georgia Madrigal  : 1970      Payor: Trey Loza / Plan: Mele Coleman RPN / Product Type: Commerical /  [id number] [facility], [address], [place], [id number] [place] [address]  [phone]   [phone]        OUTPATIENT PHYSICAL THERAPY Treatment Day Daily Note:2018    ICD-10: Treatment Diagnosis:   Stiffness of left ankle, not elsewhere classified (E38.822)  Other abnormalities of gait and mobility (R26.89)  Pain in left ankle and joints of left foot (M25.572)  Effusion, left ankle (M25.472)  Precautions/Allergies:   Review of patient's allergies indicates no known allergies. Fall Risk Score: 2 (? 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 Baljit Staley presents to physical therapy s/p Left ankle exostectomy, Left triple arthrodesis, subtalar joint malunion osteotomy, and Left Achilles lengthening  on 18. He demonstrates L ankle/foot effusion, stiffness, antalgic gait, decreased strength, ROM, joint mobility, and functional mobility limiting his ability to complete basic ADLs. These S/S are consistent with L ankle stiffness, effusion, and pain, and antalgic gait. Patient will benefit from skilled physical therapy for manual therapeutic techniques (as appropriate), therapeutic exercises and activities, neuromuscular re-education, balance training and comprehensive home exercises program to address current impairments and functional limitations. Georgia Madrigal will benefit from skilled PT (medically necessary) in order to address above deficits affecting participation in basic ADLs and overall functional tolerance. PROBLEM LIST (Impacting functional limitations):   1. Decreased Strength  2. Decreased Joint mobility/ROM  3. Decreased ADL/Functional Activities  4. Decreased Transfer Abilities  5. Decreased Ambulation Ability/Technique  6. Decreased Balance  7. Increased Pain  8. Decreased Activity Tolerance  9. Decreased [place] with Home Exercise Program INTERVENTIONS PLANNED:   1. Balance Exercise  2. Bed Mobility  3. Cold  4. Family Education  5. Gait Training  6. Heat  7. Home Exercise Program (HEP)  8. Manual Therapy  9. Neuromuscular Re-education/Strengthening  10. Range of Motion (ROM)  11. Therapeutic Activites  12. Therapeutic Exercise/Strengthening  13. Transfer Training  14. Ultrasound (US)   TREATMENT PLAN:  Effective Dates: 8/30/18 TO 10/29/2018 (60 days). Frequency/Duration: 3 times a week for 60 Days  SHORT-TERM FUNCTIONAL GOALS: Time Frame: 4 weeks  Garnette Belts will be compliant with home exercise program within 4 weeks in order to improve active participation with management of patient's symptoms and/or functional deficits. Garnette Belts will report <=3/10 pain with walking >15 minutes in order to participate in daily exercise and daily activities  Garnette Belts will be able to stand >60 minutes with <2/10 pain to feet in order to participate in household duties without issues/compromise. Garnette Belts will report being able to sleep at night without waking up secondary to foot/ankle pain. Garnette Belts  will improve ankle PF/DF active ROM from 32/0 to [place]  will improve ankle strength from 4/5 to 4+/5 to improve tolerance of ADLs. Garnette Belts will be able to ascend/descend >8 steps with reciprocal pattern to demonstrate improvement in functional mobility at home. DISCHARGE GOALS: Time Frame: 8 weeks  Garnette Belts will be independent with home exercise program within 8 weeks in order to improve independence with management of patient's symptoms and/or functional deficits.    Garnette Belts will report no pain with walking affecting participation in activities of daily living. Wesley Blum will be able to stand >60 minutes without onset of foot pain. Wesley Blum will be report improved score on FAAM from 34/84 to >60/84 to improve functional independence. HISTORY:   History of Present Injury/Illness (Reason for Referral): Wesley Blum presents to physical therapy s/p L ankle/foot reconstruction approximately 8 weeks ago. He states he broke his ankle 20 years ago but never sought treatment for it. He states it healed \"crooked\" so he has been walking on the side of his foot (medial) and after a while it began to really hurt him. He states last year he sought care for it and was placed in a boot/cast for a while to re-align his foot, however that failed so he had it surgically reconstructed and referred to PT.     -Present symptoms/complaints (on day of evaluation)   Pain Scale:  · Current: 5/10  · Best: 1/10  · Worst: 6/10    · Aggravating factors: walking long distances, going up/down steps, squatting, raising up on toes,    · Relieving factors: rest      Past Medical History/Comorbidities:   Mr. Dennison Schwab  has a past medical history of Hypertension. Mr. Dennison Schwab  has a past surgical history that includes hx hernia repair (1998) and hx colonoscopy., L ankle reconstruction (2018)    Active Ambulatory Problems     Diagnosis Date Noted    No Active Ambulatory Problems     Resolved Ambulatory Problems     Diagnosis Date Noted    No Resolved Ambulatory Problems     Past Medical History:   Diagnosis Date    Hypertension      Social History/Living Environment:        Social History     Social History    Marital status:      Spouse name: N/A    Number of children: N/A    Years of education: N/A     Occupational History    Not on file.      Social History Main Topics    Smoking status: Never Smoker    Smokeless tobacco: Never Used    Alcohol use No    Drug use: No    Sexual activity: Not on file     Other Topics Concern    Not on file     Social History Narrative     Prior Level of Function/Work/Activity:  Not working  Hobbies: walking/travel  Dominant Side:         RIGHT  Other Clinical Tests  X-ray: healing good, per pt. Previous Treatment Approach   Corrective brace and surgery  Current Medications:    Current Outpatient Prescriptions:     losartan-hydroCHLOROthiazide (HYZAAR) 100-12.5 mg per tablet, Take 1 Tab by mouth daily. Indications: hypertension, Disp: , Rfl:      Date Last Reviewed:  9/5/2018   EXAMINATION:   Observation/Orthostatic Postural Assessment:  Assessed @ Initial Visit:      Gait Antalgic with no toe-off in terminal stance on L, foot flat, and L hip circumduction    Standing; primarily WB through R LE     Palpation:  Assessed @ Initial Visit: Tenderness to palpation along achilles T with restrictions noted. Tenderness and restriction through plantar fascia and talocrual joint.   Surgical scars present good healing, however restricted in mobility/pliability                     AROM/PROM         Joint: Eval Date: 8/30/18  Re-Assess Date:  Re-Assess Date:    Active ROM RIGHT LEFT RIGHT LEFT RIGHT LEFT   Ankle Dorsiflexion 5 0       Ankle Plantarflexion 45 32       Ankle Inversion 6 0       Ankle Eversion 4 8                         Passive ROM         Ankle Dorsiflexion  1       Ankle Plantarflexion  32       Ankle Inversion  0       Ankle Eversion  8         Strength:     Eval Date: 8/30/18  Re-Assess Date:  Re-Assess Date:      RIGHT LEFT RIGHT LEFT RIGHT LEFT   Ankle Dorsiflexion 5/5 4/5       Ankle Plantarflexion 5/5 4+/5       Ankle Inversion 5/5 4/5       Ankle Eversion 5/5 4/5       Knee Flexion 5/5 5/5       Knee Extension 5/5 5/5                  Joint Mobility Eval Date: 8/30/18  Re-Assess Date:  Re-Assess Date:     RIGHT LEFT RIGHT LEFT RIGHT LEFT   Subtalar  hypomobile       Talocrual  hypomobile          Special Tests: Assessed @ Initial Visit:     Neurological Screen: Assessed @ Initial Visit No radiating symptoms down leg    Functional Mobility:  Assessed @ Initial Visit Limited tolerance of walking and standing  Heel/Toe walking= unable  Calf Raise (Dbl/Single)= Dbl unable  Balance:  Assessed @ Initial Visit   Single leg stance: R= 1-5seconds  L= 0 seconds   Body Structures Involved:  1. Bones  2. Joints  3. Muscles  4. Ligaments Body Functions Affected:  1. Sensory/Pain  2. Neuromusculoskeletal  3. Movement Related Activities and Participation Affected:  1. Mobility  2. Self Care   CLINICAL DECISION MAKING:   Tool Used: FOOT AND ANKLE ABILITY MEASURE (FAAM)  Score:  Initial: 34/84 Most Recent: X (Date: -- )   Interpretation of Score: For the \"Activities of Daily Living\", there are 21 questions each scored on a 5 point scale with 0 representing \"Unable to do\" and 4 representing \"No difficulty\". The lower the score, the greater the functional disability. 84/84 represents no disability. Minimal detectable change is 5.7 points. With the addition of the 8 questions in the \"Sports Subscale,\" there are 29 questions, each scored on a 5 point scale with 0 representing \"Unable to do\" and 4 representing \"No difficulty\". The lower the score, the greater the functional disability. 116/116 represents no disability. Minimal detectable change is 12.3 points. Activities of Daily Living:  Score 84 83-68 67-51 50-34 33-18 17-1 0   Modifier CH CI CJ CK CL CM CN     Activities of Daily Living + Sports Subscale:  Score 116 115-94 93-71 70-47 46-24 23-1 0   Modifier CH CI CJ CK CL CM CN     ?  Mobility - Walking and Moving Around:     - CURRENT STATUS: CL - 60%-79% impaired, limited or restricted    - GOAL STATUS: CJ - 20%-39% impaired, limited or restricted    - D/C STATUS:  ---------------To be determined---------------      Medical Necessity:   · Patient is expected to demonstrate progress in strength, range of motion, balance, coordination and functional technique to increase independence with functional mobility at home to perform household duties and improve safety during ascending/descending stairs, walking on uneven ground, and squatting. .  Reason for Services/Other Comments:  · Patient requires manual therapeutic interventions as well as thereapeutic exercises/activities and gait training to improve patients saftey with ambulation in the community and functional mobility in and out of home. .   TREATMENT:   (In addition to Assessment/Re-Assessment sessions the following treatments were rendered)      · Pre-Treatment Pain/ Symptoms: Pt reports his foot has been feeling better  5/10       THERAPEUTIC EXERCISE: (12 minutes):  Exercises per grid below to improve mobility, strength and balance. Required minimal visual and verbal cues to promote proper body alignment, promote proper body posture and promote proper body mechanics. Progressed resistance, range, repetitions and complexity of movement as indicated. Date:  8/30/18 Date:  9/5/18 Date:     Exercise Parameters Parameters Parameters   Education POC, PT goals, HEP, ICE     Plantar fascia stretch 3x30\"     Calf stretch  Standing 5x10\"     Towel curls 30x 30x    Seated calf raises 30x     Wobble board  Seated 30x fwd/backward  30x side/side    Plantar flex  Green band 30x    Bike  8 mins level4       [employer] Portal    Therapeutic Activity: (10 minutes): Activity Date:  9/5/18 Date:   Date:     Exercise Parameters Parameters Parameters   STS No UE support 2x10;equal WB     Step overs Heel/toe pattern 20x each                                     Neuromuscular Education:      Manual Therapy Interventions: (23 minutes): Manual interventions per grid below performed to improve joint/soft tissue mobility and ROM to improve ability to perform ADLs. Patient responded well to all manual interventions with no significant increase in pain/symptoms.  Improved ankle/foot ROM demonstrated following interventions and decrease in pain scale listed below. Manual Intervention Date:  8/30/18 Date:  9/5/18 Date:     Soft tissue mobilization Joint Mobility Parameters Parameters Parameters    Talocrual A/P Grade III A/P Grade III     Subtalar Med/lat grade III Med/lat Grade III-IV    Plantar fascia  Release     Gastroc/soleus   Strumming/release                           MODALITIES: (10 minutes):        *  Cold Pack Therapy in order to provide analgesia and reduce inflammation and edema. Location= L ankle  Skin intact pre and post treatment with no adverse symptoms    Treatment/Session Assessment:  Good tolerance to manual interventions performed. A lot of restrictions and trigger points in gastroc/soleus today with strumming/release. Pt requires cues for wobble board lateral foot motion secondary to poor coordination. Difficulty with toe-off on L foot with cone step overs. HEP updated to include PF resisted band strengthening to help improve toe off coordination with terminal stance of gait. · Pain: Post Session report:  Pain: 4/10      GOOD ·   Compliance with Program/Exercises: Compliant  · Recommendations/Intent for next treatment session: \"Next visit will focus on advancements to more challenging activities and reduction in assistance provided\".     Future Appointments  Date Time Provider Tay Aguayo   9/7/2018 9:45 AM Son Du [facility]   9/10/2018 9:30 AM Irais Du [facility]   9/12/2018 9:45 AM Irais Michellei [facility]   9/14/2018 9:30 AM Alban Jose DPT [facility]   9/17/2018 9:30 AM Alban Jose DPT [facility]   9/19/2018 9:30 AM Son Michellei [facility]   9/21/2018 9:30 AM Son Du [facility]   9/24/2018 9:30 AM Son Torresrmami Du [facility]   9/26/2018 9:30 AM Son Torresrmami Du [facility]   9/28/2018 9:30 AM Son  Milagrosmelina Michellei [facility]               Total Treatment Duration: 55 mins  PT Patient Time In/Time Out  Time In: 0935  Time Out: [facility]

## 2018-09-07 ENCOUNTER — HOSPITAL ENCOUNTER (OUTPATIENT)
Dept: PHYSICAL THERAPY | Age: 48
Discharge: HOME | End: 2018-09-07
Payer: COMMERCIAL

## 2018-09-07 PROCEDURE — 97530 THERAPEUTIC ACTIVITIES: CPT

## 2018-09-07 PROCEDURE — 97110 THERAPEUTIC EXERCISES: CPT

## 2018-09-07 PROCEDURE — 97140 MANUAL THERAPY 1/> REGIONS: CPT

## 2018-09-07 NOTE — PROGRESS NOTES
Jackeline Wen  : 1970      Payor: Uma Steward / Plan: [id number] RPN / Product Type: Commerical /  [id number] [facility], [address], [place], [id number] [place] [address]  [phone]   [phone]        OUTPATIENT PHYSICAL THERAPY Treatment Day Daily Note:2018    ICD-10: Treatment Diagnosis:   Stiffness of left ankle, not elsewhere classified (T09.411)  Other abnormalities of gait and mobility (R26.89)  Pain in left ankle and joints of left foot (M25.572)  Effusion, left ankle (M25.472)  Precautions/Allergies:   Review of patient's allergies indicates no known allergies. Fall Risk Score: 2 (? 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 Ruby Lawrence presents to physical therapy s/p Left ankle exostectomy, Left triple arthrodesis, subtalar joint malunion osteotomy, and Left Achilles lengthening  on 18. He demonstrates L ankle/foot effusion, stiffness, antalgic gait, decreased strength, ROM, joint mobility, and functional mobility limiting his ability to complete basic ADLs. These S/S are consistent with L ankle stiffness, effusion, and pain, and antalgic gait. Patient will benefit from skilled physical therapy for manual therapeutic techniques (as appropriate), therapeutic exercises and activities, neuromuscular re-education, balance training and comprehensive home exercises program to address current impairments and functional limitations. Jackeline Wen will benefit from skilled PT (medically necessary) in order to address above deficits affecting participation in basic ADLs and overall functional tolerance. PROBLEM LIST (Impacting functional limitations):   1. Decreased Strength  2. Decreased Joint mobility/ROM  3. Decreased ADL/Functional Activities  4. Decreased Transfer Abilities  5. Decreased Ambulation Ability/Technique  6. Decreased Balance  7. Increased Pain  8. Decreased Activity Tolerance  9. Decreased [place] with Home Exercise Program INTERVENTIONS PLANNED:   1. Balance Exercise  2. Bed Mobility  3. Cold  4. Family Education  5. Gait Training  6. Heat  7. Home Exercise Program (HEP)  8. Manual Therapy  9. Neuromuscular Re-education/Strengthening  10. Range of Motion (ROM)  11. Therapeutic Activites  12. Therapeutic Exercise/Strengthening  13. Transfer Training  14. Ultrasound (US)   TREATMENT PLAN:  Effective Dates: 8/30/18 TO 10/29/2018 (60 days). Frequency/Duration: 3 times a week for 60 Days  SHORT-TERM FUNCTIONAL GOALS: Time Frame: 4 weeks  Jackeline Wen will be compliant with home exercise program within 4 weeks in order to improve active participation with management of patient's symptoms and/or functional deficits. Jackeline Wen will report <=3/10 pain with walking >15 minutes in order to participate in daily exercise and daily activities  Jackeline Wen will be able to stand >60 minutes with <2/10 pain to feet in order to participate in household duties without issues/compromise. Jackeline Wen will report being able to sleep at night without waking up secondary to foot/ankle pain. Jackeline Wen  will improve ankle PF/DF active ROM from 32/0 to [place]  will improve ankle strength from 4/5 to 4+/5 to improve tolerance of ADLs. Jackeline Wen will be able to ascend/descend >8 steps with reciprocal pattern to demonstrate improvement in functional mobility at home. DISCHARGE GOALS: Time Frame: 8 weeks  Jackeline Wen will be independent with home exercise program within 8 weeks in order to improve independence with management of patient's symptoms and/or functional deficits.    Jackeline Wen will report no pain with walking affecting participation in activities of daily living. Jackeline Wen will be able to stand >60 minutes without onset of foot pain. Jackeline Wen will be report improved score on FAAM from 34/84 to >60/84 to improve functional independence. HISTORY:   History of Present Injury/Illness (Reason for Referral): Jackeline Wen presents to physical therapy s/p L ankle/foot reconstruction approximately 8 weeks ago. He states he broke his ankle 20 years ago but never sought treatment for it. He states it healed \"crooked\" so he has been walking on the side of his foot (medial) and after a while it began to really hurt him. He states last year he sought care for it and was placed in a boot/cast for a while to re-align his foot, however that failed so he had it surgically reconstructed and referred to PT.     -Present symptoms/complaints (on day of evaluation)   Pain Scale:  · Current: 5/10  · Best: 1/10  · Worst: 6/10    · Aggravating factors: walking long distances, going up/down steps, squatting, raising up on toes,    · Relieving factors: rest      Past Medical History/Comorbidities:   Mr. Ruby Lawrence  has a past medical history of Hypertension. Mr. Ruby Lawrence  has a past surgical history that includes hx hernia repair (1998) and hx colonoscopy., L ankle reconstruction (2018)    Active Ambulatory Problems     Diagnosis Date Noted    No Active Ambulatory Problems     Resolved Ambulatory Problems     Diagnosis Date Noted    No Resolved Ambulatory Problems     Past Medical History:   Diagnosis Date    Hypertension      Social History/Living Environment:        Social History     Social History    Marital status:      Spouse name: N/A    Number of children: N/A    Years of education: N/A     Occupational History    Not on file.      Social History Main Topics    Smoking status: Never Smoker    Smokeless tobacco: Never Used    Alcohol use No    Drug use: No    Sexual activity: Not on file     Other Topics Concern    Not on file     Social History Narrative     Prior Level of Function/Work/Activity:  Not working  Hobbies: walking/travel  Dominant Side:         RIGHT  Other Clinical Tests  X-ray: healing good, per pt. Previous Treatment Approach   Corrective brace and surgery  Current Medications:    Current Outpatient Prescriptions:     losartan-hydroCHLOROthiazide (HYZAAR) 100-12.5 mg per tablet, Take 1 Tab by mouth daily. Indications: hypertension, Disp: , Rfl:      Date Last Reviewed:  9/7/2018   EXAMINATION:   Observation/Orthostatic Postural Assessment:  Assessed @ Initial Visit:      Gait Antalgic with no toe-off in terminal stance on L, foot flat, and L hip circumduction    Standing; primarily WB through R LE     Palpation:  Assessed @ Initial Visit: Tenderness to palpation along achilles T with restrictions noted. Tenderness and restriction through plantar fascia and talocrual joint.   Surgical scars present good healing, however restricted in mobility/pliability                     AROM/PROM         Joint: Eval Date: 8/30/18  Re-Assess Date:  Re-Assess Date:    Active ROM RIGHT LEFT RIGHT LEFT RIGHT LEFT   Ankle Dorsiflexion 5 0       Ankle Plantarflexion 45 32       Ankle Inversion 6 0       Ankle Eversion 4 8                         Passive ROM         Ankle Dorsiflexion  1       Ankle Plantarflexion  32       Ankle Inversion  0       Ankle Eversion  8         Strength:     Eval Date: 8/30/18  Re-Assess Date:  Re-Assess Date:      RIGHT LEFT RIGHT LEFT RIGHT LEFT   Ankle Dorsiflexion 5/5 4/5       Ankle Plantarflexion 5/5 4+/5       Ankle Inversion 5/5 4/5       Ankle Eversion 5/5 4/5       Knee Flexion 5/5 5/5       Knee Extension 5/5 5/5                  Joint Mobility Eval Date: 8/30/18  Re-Assess Date:  Re-Assess Date:     RIGHT LEFT RIGHT LEFT RIGHT LEFT   Subtalar  hypomobile       Talocrual  hypomobile          Special Tests: Assessed @ Initial Visit:     Neurological Screen: Assessed @ Initial Visit No radiating symptoms down leg    Functional Mobility:  Assessed @ Initial Visit Limited tolerance of walking and standing  Heel/Toe walking= unable  Calf Raise (Dbl/Single)= Dbl unable  Balance:  Assessed @ Initial Visit   Single leg stance: R= 1-5seconds  L= 0 seconds   Body Structures Involved:  1. Bones  2. Joints  3. Muscles  4. Ligaments Body Functions Affected:  1. Sensory/Pain  2. Neuromusculoskeletal  3. Movement Related Activities and Participation Affected:  1. Mobility  2. Self Care   CLINICAL DECISION MAKING:   Tool Used: FOOT AND ANKLE ABILITY MEASURE (FAAM)  Score:  Initial: 34/84 Most Recent: X (Date: -- )   Interpretation of Score: For the \"Activities of Daily Living\", there are 21 questions each scored on a 5 point scale with 0 representing \"Unable to do\" and 4 representing \"No difficulty\". The lower the score, the greater the functional disability. 84/84 represents no disability. Minimal detectable change is 5.7 points. With the addition of the 8 questions in the \"Sports Subscale,\" there are 29 questions, each scored on a 5 point scale with 0 representing \"Unable to do\" and 4 representing \"No difficulty\". The lower the score, the greater the functional disability. 116/116 represents no disability. Minimal detectable change is 12.3 points. Activities of Daily Living:  Score 84 83-68 67-51 50-34 33-18 17-1 0   Modifier CH CI CJ CK CL CM CN     Activities of Daily Living + Sports Subscale:  Score 116 115-94 93-71 70-47 46-24 23-1 0   Modifier CH CI CJ CK CL CM CN     ?  Mobility - Walking and Moving Around:     - CURRENT STATUS: CL - 60%-79% impaired, limited or restricted    - GOAL STATUS: CJ - 20%-39% impaired, limited or restricted    - D/C STATUS:  ---------------To be determined---------------      Medical Necessity:   · Patient is expected to demonstrate progress in strength, range of motion, balance, coordination and functional technique to increase independence with functional mobility at home to perform household duties and improve safety during ascending/descending stairs, walking on uneven ground, and squatting. .  Reason for Services/Other Comments:  · Patient requires manual therapeutic interventions as well as thereapeutic exercises/activities and gait training to improve patients saftey with ambulation in the community and functional mobility in and out of home. .   TREATMENT:   (In addition to Assessment/Re-Assessment sessions the following treatments were rendered)      · Pre-Treatment Pain/ Symptoms: Pt reports he is doing well. 5/10       THERAPEUTIC EXERCISE: (12 minutes):  Exercises per grid below to improve mobility, strength and balance. Required minimal visual and verbal cues to promote proper body alignment, promote proper body posture and promote proper body mechanics. Progressed resistance, range, repetitions and complexity of movement as indicated. Date:  8/30/18 Date:  9/5/18 Date:  9/7/18   Exercise Parameters Parameters Parameters   Education POC, PT goals, HEP, ICE     Plantar fascia stretch 3x30\"  3x30\"   Calf stretch  Standing 5x10\"  3x30\" and HS stretch   Towel curls 30x 30x 30x   Seated calf raises 30x     Wobble board  Seated 30x fwd/backward  30x side/side Seated 30x fwd/backward  30x side/side   Plantar flex  Green band 30x Green band 30x   Bike  8 mins level4 10 mins level 4      [employer] [facility]    Therapeutic Activity: (10 minutes): Activity Date:  9/5/18 Date:  9/7/18 Date:     Exercise Parameters Parameters Parameters   STS No UE support 2x10;equal WB     Step overs Heel/toe pattern 20x each     Cone taps  2x10 for SL balance    Seated heel raises  30x                        Neuromuscular Education:      Manual Therapy Interventions: (20 minutes): Manual interventions per grid below performed to improve joint/soft tissue mobility and ROM to improve ability to perform ADLs.  Patient responded well to all manual interventions with no significant increase in pain/symptoms. Improved ankle/foot ROM demonstrated following interventions and decrease in pain scale listed below. Manual Intervention Date:  8/30/18 Date:  9/5/18 Date:  9/7/18   Soft tissue mobilization Joint Mobility Parameters Parameters Parameters    Talocrual A/P Grade III A/P Grade III A/P grade III    Subtalar Med/lat grade III Med/lat Grade III-IV Med/lat grade III-IV   Plantar fascia  Release  release   Gastroc/soleus   Strumming/release Strumming/release                          MODALITIES: (10 minutes):        *  Cold Pack Therapy in order to provide analgesia and reduce inflammation and edema. Location= L ankle  Skin intact pre and post treatment with no adverse symptoms    Treatment/Session Assessment:  Good tolerance to manual interventions performed. Continued difficulty with single leg stance but able to improve with verbal cues for abdominal contraction. Pt educated on increasing frequency and reps/sets of resisted plantar flexion with band and heel raises at home. · Pain: Post Session report:  Pain: 4/10      GOOD ·   Compliance with Program/Exercises: Compliant  · Recommendations/Intent for next treatment session: \"Next visit will focus on advancements to more challenging activities and reduction in assistance provided\".     Future Appointments  Date Time Provider Tay Aguayo   9/10/2018 9:30 AM [facility]   9/12/2018 9:45 AM Irais Ransobia Bers [facility]   9/14/2018 9:30 AM Юлия Manges, DPT [facility]   9/17/2018 9:30 AM Юлия Manges, DPT [facility]   9/19/2018 9:30 AM Chandrakant Beam Ranelle Bers [facility]   9/21/2018 9:30 AM Chandrakant Beam Ranelle Bers [facility]   9/24/2018 9:30 AM Chandrakant Beam Ranelle Bers [facility]   9/26/2018 9:30 AM Chandrakant Beam Ranelle Bers [facility]   9/28/2018 9:30 AM Chandrakant Beam Ranelle Bers [facility]               Total Treatment Duration: 52 mins  PT Patient Time In/Time Out  Time In: 0950  Time Out: [address]

## 2018-09-10 ENCOUNTER — HOSPITAL ENCOUNTER (OUTPATIENT)
Dept: PHYSICAL THERAPY | Age: 48
Discharge: HOME | End: 2018-09-10
Payer: COMMERCIAL

## 2018-09-10 PROCEDURE — 97110 THERAPEUTIC EXERCISES: CPT

## 2018-09-10 PROCEDURE — 97530 THERAPEUTIC ACTIVITIES: CPT

## 2018-09-10 PROCEDURE — 97140 MANUAL THERAPY 1/> REGIONS: CPT

## 2018-09-10 NOTE — PROGRESS NOTES
Gely Younger  : 1970      Payor: Aga Nichols / Plan: [id number] RPN / Product Type: Commerical /  [address] [facility], [address], [place], [id number] [place] [address]  [phone]   [phone]        OUTPATIENT PHYSICAL THERAPY Treatment Day Daily Note:9/10/2018    ICD-10: Treatment Diagnosis:   Stiffness of left ankle, not elsewhere classified (Q34.804)  Other abnormalities of gait and mobility (R26.89)  Pain in left ankle and joints of left foot (M25.572)  Effusion, left ankle (M25.472)  Precautions/Allergies:   Review of patient's allergies indicates no known allergies. Fall Risk Score: 2 (? 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 Keven Becker presents to physical therapy s/p Left ankle exostectomy, Left triple arthrodesis, subtalar joint malunion osteotomy, and Left Achilles lengthening  on 18. He demonstrates L ankle/foot effusion, stiffness, antalgic gait, decreased strength, ROM, joint mobility, and functional mobility limiting his ability to complete basic ADLs. These S/S are consistent with L ankle stiffness, effusion, and pain, and antalgic gait. Patient will benefit from skilled physical therapy for manual therapeutic techniques (as appropriate), therapeutic exercises and activities, neuromuscular re-education, balance training and comprehensive home exercises program to address current impairments and functional limitations. Gely Younger will benefit from skilled PT (medically necessary) in order to address above deficits affecting participation in basic ADLs and overall functional tolerance. PROBLEM LIST (Impacting functional limitations):   1. Decreased Strength  2. Decreased Joint mobility/ROM  3. Decreased ADL/Functional Activities  4. Decreased Transfer Abilities  5. Decreased Ambulation Ability/Technique  6. Decreased Balance  7. Increased Pain  8. Decreased Activity Tolerance  9. Decreased [place] with Home Exercise Program INTERVENTIONS PLANNED:   1. Balance Exercise  2. Bed Mobility  3. Cold  4. Family Education  5. Gait Training  6. Heat  7. Home Exercise Program (HEP)  8. Manual Therapy  9. Neuromuscular Re-education/Strengthening  10. Range of Motion (ROM)  11. Therapeutic Activites  12. Therapeutic Exercise/Strengthening  13. Transfer Training  14. Ultrasound (US)   TREATMENT PLAN:  Effective Dates: 8/30/18 TO 10/29/2018 (60 days). Frequency/Duration: 3 times a week for 60 Days  SHORT-TERM FUNCTIONAL GOALS: Time Frame: 4 weeks  Nilay Dodge will be compliant with home exercise program within 4 weeks in order to improve active participation with management of patient's symptoms and/or functional deficits. Nilay Dodge will report <=3/10 pain with walking >15 minutes in order to participate in daily exercise and daily activities  Nilay Dodge will be able to stand >60 minutes with <2/10 pain to feet in order to participate in household duties without issues/compromise. Nilay Dodge will report being able to sleep at night without waking up secondary to foot/ankle pain. Nilay Dodge  will improve ankle PF/DF active ROM from 32/0 to [place]  will improve ankle strength from 4/5 to 4+/5 to improve tolerance of ADLs. Nilay Dodge will be able to ascend/descend >8 steps with reciprocal pattern to demonstrate improvement in functional mobility at home. DISCHARGE GOALS: Time Frame: 8 weeks  Nilay Dodge will be independent with home exercise program within 8 weeks in order to improve independence with management of patient's symptoms and/or functional deficits.    Nilay Dodge will report no pain with walking affecting participation in activities of daily living. Bronson Dumont will be able to stand >60 minutes without onset of foot pain. Bronson Dumont will be report improved score on FAAM from 34/84 to >60/84 to improve functional independence. HISTORY:   History of Present Injury/Illness (Reason for Referral): Bronson Dumont presents to physical therapy s/p L ankle/foot reconstruction approximately 8 weeks ago. He states he broke his ankle 20 years ago but never sought treatment for it. He states it healed \"crooked\" so he has been walking on the side of his foot (medial) and after a while it began to really hurt him. He states last year he sought care for it and was placed in a boot/cast for a while to re-align his foot, however that failed so he had it surgically reconstructed and referred to PT.     -Present symptoms/complaints (on day of evaluation)   Pain Scale:  · Current: 5/10  · Best: 1/10  · Worst: 6/10    · Aggravating factors: walking long distances, going up/down steps, squatting, raising up on toes,    · Relieving factors: rest      Past Medical History/Comorbidities:   Mr. Dg Kim  has a past medical history of Hypertension. Mr. Dg Kim  has a past surgical history that includes hx hernia repair (1998) and hx colonoscopy., L ankle reconstruction (2018)    Active Ambulatory Problems     Diagnosis Date Noted    No Active Ambulatory Problems     Resolved Ambulatory Problems     Diagnosis Date Noted    No Resolved Ambulatory Problems     Past Medical History:   Diagnosis Date    Hypertension      Social History/Living Environment:        Social History     Social History    Marital status:      Spouse name: N/A    Number of children: N/A    Years of education: N/A     Occupational History    Not on file.      Social History Main Topics    Smoking status: Never Smoker    Smokeless tobacco: Never Used    Alcohol use No    Drug use: No    Sexual activity: Not on file     Other Topics Concern    Not on file     Social History Narrative     Prior Level of Function/Work/Activity:  Not working  Hobbies: walking/travel  Dominant Side:         RIGHT  Other Clinical Tests  X-ray: healing good, per pt. Previous Treatment Approach   Corrective brace and surgery  Current Medications:    Current Outpatient Prescriptions:     losartan-hydroCHLOROthiazide (HYZAAR) 100-12.5 mg per tablet, Take 1 Tab by mouth daily. Indications: hypertension, Disp: , Rfl:      Date Last Reviewed:  9/10/2018   EXAMINATION:   Observation/Orthostatic Postural Assessment:  Assessed @ Initial Visit:      Gait Antalgic with no toe-off in terminal stance on L, foot flat, and L hip circumduction    Standing; primarily WB through R LE     Palpation:  Assessed @ Initial Visit: Tenderness to palpation along achilles T with restrictions noted. Tenderness and restriction through plantar fascia and talocrual joint.   Surgical scars present good healing, however restricted in mobility/pliability                     AROM/PROM         Joint: Eval Date: 8/30/18  Re-Assess Date:  Re-Assess Date:    Active ROM RIGHT LEFT RIGHT LEFT RIGHT LEFT   Ankle Dorsiflexion 5 0       Ankle Plantarflexion 45 32       Ankle Inversion 6 0       Ankle Eversion 4 8                         Passive ROM         Ankle Dorsiflexion  1       Ankle Plantarflexion  32       Ankle Inversion  0       Ankle Eversion  8         Strength:     Eval Date: 8/30/18  Re-Assess Date:  Re-Assess Date:      RIGHT LEFT RIGHT LEFT RIGHT LEFT   Ankle Dorsiflexion 5/5 4/5       Ankle Plantarflexion 5/5 4+/5       Ankle Inversion 5/5 4/5       Ankle Eversion 5/5 4/5       Knee Flexion 5/5 5/5       Knee Extension 5/5 5/5                  Joint Mobility Eval Date: 8/30/18  Re-Assess Date:  Re-Assess Date:     RIGHT LEFT RIGHT LEFT RIGHT LEFT   Subtalar  hypomobile       Talocrual  hypomobile          Special Tests: Assessed @ Initial Visit:     Neurological Screen: Assessed @ Initial Visit No radiating symptoms down leg    Functional Mobility:  Assessed @ Initial Visit Limited tolerance of walking and standing  Heel/Toe walking= unable  Calf Raise (Dbl/Single)= Dbl unable  Balance:  Assessed @ Initial Visit   Single leg stance: R= 1-5seconds  L= 0 seconds   Body Structures Involved:  1. Bones  2. Joints  3. Muscles  4. Ligaments Body Functions Affected:  1. Sensory/Pain  2. Neuromusculoskeletal  3. Movement Related Activities and Participation Affected:  1. Mobility  2. Self Care   CLINICAL DECISION MAKING:   Tool Used: FOOT AND ANKLE ABILITY MEASURE (FAAM)  Score:  Initial: 34/84 Most Recent: X (Date: -- )   Interpretation of Score: For the \"Activities of Daily Living\", there are 21 questions each scored on a 5 point scale with 0 representing \"Unable to do\" and 4 representing \"No difficulty\". The lower the score, the greater the functional disability. 84/84 represents no disability. Minimal detectable change is 5.7 points. With the addition of the 8 questions in the \"Sports Subscale,\" there are 29 questions, each scored on a 5 point scale with 0 representing \"Unable to do\" and 4 representing \"No difficulty\". The lower the score, the greater the functional disability. 116/116 represents no disability. Minimal detectable change is 12.3 points. Activities of Daily Living:  Score 84 83-68 67-51 50-34 33-18 17-1 0   Modifier CH CI CJ CK CL CM CN     Activities of Daily Living + Sports Subscale:  Score 116 115-94 93-71 70-47 46-24 23-1 0   Modifier CH CI CJ CK CL CM CN     ?  Mobility - Walking and Moving Around:     - CURRENT STATUS: CL - 60%-79% impaired, limited or restricted    - GOAL STATUS: CJ - 20%-39% impaired, limited or restricted    - D/C STATUS:  ---------------To be determined---------------      Medical Necessity:   · Patient is expected to demonstrate progress in strength, range of motion, balance, coordination and functional technique to increase independence with functional mobility at home to perform household duties and improve safety during ascending/descending stairs, walking on uneven ground, and squatting. .  Reason for Services/Other Comments:  · Patient requires manual therapeutic interventions as well as thereapeutic exercises/activities and gait training to improve patients saftey with ambulation in the community and functional mobility in and out of home. .   TREATMENT:   (In addition to Assessment/Re-Assessment sessions the following treatments were rendered)      · Pre-Treatment Pain/ Symptoms: Pt reports he is doing good and the foot feeling great  0/10       THERAPEUTIC EXERCISE: (15 minutes):  Exercises per grid below to improve mobility, strength and balance. Required minimal visual and verbal cues to promote proper body alignment, promote proper body posture and promote proper body mechanics. Progressed resistance, range, repetitions and complexity of movement as indicated. Date:  8/30/18 Date:  9/5/18 Date:  9/7/18 Date  9/10/18   Exercise Parameters Parameters Parameters    Education POC, PT goals, HEP, ICE      Plantar fascia stretch 3x30\"  3x30\"    Calf stretch  Standing 5x10\"  3x30\" and HS stretch 3x30\"   Towel curls 30x 30x 30x    Seated calf raises 30x   10x   Wobble board  Seated 30x fwd/backward  30x side/side Seated 30x fwd/backward  30x side/side Seated 30x fwd/backward  30x side/side   Plantar flex  Green band 30x Green band 30x    Bike  8 mins level4 10 mins level 4 10 mins      [employer] Portal    Therapeutic Activity: (15 minutes):      Activity Date:  9/5/18 Date:  9/7/18 Date:  9/10/18   Exercise Parameters Parameters Parameters   STS No UE support 2x10;equal WB  20x L foot back for increase WB    Step overs Heel/toe pattern 20x each     Cone taps  2x10 for SL balance 20x for SL balance   Seated heel raises  30x 20x   Tight rope walking   20ft 4x   Lunge on step   10x10\" for tibial translation   Gait training 50ft for heel/toe and hip/knee flex     Neuromuscular Education:      Manual Therapy Interventions: (15 minutes): Manual interventions per grid below performed to improve joint/soft tissue mobility and ROM to improve ability to perform ADLs. Patient responded well to all manual interventions with no significant increase in pain/symptoms. Improved ankle/foot ROM demonstrated following interventions and decrease in pain scale listed below. Manual Intervention Date:  8/30/18 Date:  9/5/18 Date:  9/7/18 Date  9/10/18   Soft tissue mobilization Joint Mobility Parameters Parameters Parameters     Talocrual A/P Grade III A/P Grade III A/P grade III A/P grade III    Subtalar Med/lat grade III Med/lat Grade III-IV Med/lat grade III-IV Med/lst grade III-IV   Plantar fascia  Release  release release   Gastroc/soleus   Strumming/release Strumming/release strumming                             MODALITIES: (0 minutes):        *  Cold Pack Therapy in order to provide analgesia and reduce inflammation and edema. Location= L ankle  Skin intact pre and post treatment with no adverse symptoms    Treatment/Session Assessment: Gait improved with verbal cues for heel/toe pattern and hip/knee flexion. Improved standing heel raises today demonstrated increase strength. Difficulty with tight-rope walking, but improved with cues for core contraction. · Pain: Post Session report:  Pain: 4/10      GOOD ·   Compliance with Program/Exercises: Compliant  · Recommendations/Intent for next treatment session: \"Next visit will focus on advancements to more challenging activities and reduction in assistance provided\".     Future Appointments  Date Time Provider Tay Aguayo   9/12/2018 9:45 AM Kylie Benito [facility]   9/14/2018 9:30 AM LISA WatsonOSRPNORAH [facility]   9/17/2018 9:30 AM LISA [facility]RPNORAH [facility]   9/19/2018 9:30 AM Minerva Horning Benito Osler [facility]RPNOR[facility]   9/21/2018 9:30 AM Neo Bath [facility]   9/24/2018 9:30 AM Irais Killian [facility]   9/26/2018 9:30 AM Irais GARDUNOOSCRIS [facility]   9/28/2018 9:30 AM Irais Killian [facility]               Total Treatment Duration: 45 mins  PT Patient Time In/Time Out  Time In: 0977  Time Out: 650 [facility]

## 2018-09-12 ENCOUNTER — HOSPITAL ENCOUNTER (OUTPATIENT)
Dept: PHYSICAL THERAPY | Age: 48
Discharge: HOME | End: 2018-09-12
Payer: COMMERCIAL

## 2018-09-12 PROCEDURE — 97140 MANUAL THERAPY 1/> REGIONS: CPT

## 2018-09-12 PROCEDURE — 97530 THERAPEUTIC ACTIVITIES: CPT

## 2018-09-12 PROCEDURE — 97110 THERAPEUTIC EXERCISES: CPT

## 2018-09-12 NOTE — PROGRESS NOTES
Georgia Madrigal  : 1970      Payor: Trey Loza / Plan: Mele Coleman RPN / Product Type: Commerical /  [address] [address] at [place] [address]. Critical access hospital, [address], [facility], [address] [place] [address] Road  [phone]   [phone]        OUTPATIENT PHYSICAL THERAPY Treatment Day Daily Note:2018    ICD-10: Treatment Diagnosis:   Stiffness of left ankle, not elsewhere classified (J31.384)  Other abnormalities of gait and mobility (R26.89)  Pain in left ankle and joints of left foot (M25.572)  Effusion, left ankle (M25.472)  Precautions/Allergies:   Review of patient's allergies indicates no known allergies. Fall Risk Score: 2 (? 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 Baljit Staley presents to physical therapy s/p Left ankle exostectomy, Left triple arthrodesis, subtalar joint malunion osteotomy, and Left Achilles lengthening  on 18. He demonstrates L ankle/foot effusion, stiffness, antalgic gait, decreased strength, ROM, joint mobility, and functional mobility limiting his ability to complete basic ADLs. These S/S are consistent with L ankle stiffness, effusion, and pain, and antalgic gait. Patient will benefit from skilled physical therapy for manual therapeutic techniques (as appropriate), therapeutic exercises and activities, neuromuscular re-education, balance training and comprehensive home exercises program to address current impairments and functional limitations. Georgia Madrigal will benefit from skilled PT (medically necessary) in order to address above deficits affecting participation in basic ADLs and overall functional tolerance. PROBLEM LIST (Impacting functional limitations):   1. Decreased Strength  2. Decreased Joint mobility/ROM  3. Decreased ADL/Functional Activities  4. Decreased Transfer Abilities  5. Decreased Ambulation Ability/Technique  6. Decreased Balance  7. Increased Pain  8. Decreased Activity Tolerance  9. Decreased [place] with Home Exercise Program INTERVENTIONS PLANNED:   1. Balance Exercise  2. Bed Mobility  3. Cold  4. Family Education  5. Gait Training  6. Heat  7. Home Exercise Program (HEP)  8. Manual Therapy  9. Neuromuscular Re-education/Strengthening  10. Range of Motion (ROM)  11. Therapeutic Activites  12. Therapeutic Exercise/Strengthening  13. Transfer Training  14. Ultrasound (US)   TREATMENT PLAN:  Effective Dates: 8/30/18 TO 10/29/2018 (60 days). Frequency/Duration: 3 times a week for 60 Days  SHORT-TERM FUNCTIONAL GOALS: Time Frame: 4 weeks  Ramiro Flores will be compliant with home exercise program within 4 weeks in order to improve active participation with management of patient's symptoms and/or functional deficits. Ramiro Flores will report <=3/10 pain with walking >15 minutes in order to participate in daily exercise and daily activities  Ramiro Flores will be able to stand >60 minutes with <2/10 pain to feet in order to participate in household duties without issues/compromise. Ramiro Flores will report being able to sleep at night without waking up secondary to foot/ankle pain. Ramiro Flores  will improve ankle PF/DF active ROM from 32/0 to [place]  will improve ankle strength from 4/5 to 4+/5 to improve tolerance of ADLs. Ramiro Flores will be able to ascend/descend >8 steps with reciprocal pattern to demonstrate improvement in functional mobility at home. DISCHARGE GOALS: Time Frame: 8 weeks  Ramiro Flores will be independent with home exercise program within 8 weeks in order to improve independence with management of patient's symptoms and/or functional deficits.    Ramiro Flores will report no pain with walking affecting participation in activities of daily living. Florina Buchanan will be able to stand >60 minutes without onset of foot pain. Florina Buchanan will be report improved score on FAAM from 34/84 to >60/84 to improve functional independence. HISTORY:   History of Present Injury/Illness (Reason for Referral): Florina Buchanan presents to physical therapy s/p L ankle/foot reconstruction approximately 8 weeks ago. He states he broke his ankle 20 years ago but never sought treatment for it. He states it healed \"crooked\" so he has been walking on the side of his foot (medial) and after a while it began to really hurt him. He states last year he sought care for it and was placed in a boot/cast for a while to re-align his foot, however that failed so he had it surgically reconstructed and referred to PT.     -Present symptoms/complaints (on day of evaluation)   Pain Scale:  · Current: 5/10  · Best: 1/10  · Worst: 6/10    · Aggravating factors: walking long distances, going up/down steps, squatting, raising up on toes,    · Relieving factors: rest      Past Medical History/Comorbidities:   Mr. Pascale Warren  has a past medical history of Hypertension. Mr. Pascale Warren  has a past surgical history that includes hx hernia repair (1998) and hx colonoscopy., L ankle reconstruction (2018)    Active Ambulatory Problems     Diagnosis Date Noted    No Active Ambulatory Problems     Resolved Ambulatory Problems     Diagnosis Date Noted    No Resolved Ambulatory Problems     Past Medical History:   Diagnosis Date    Hypertension      Social History/Living Environment:        Social History     Social History    Marital status:      Spouse name: N/A    Number of children: N/A    Years of education: N/A     Occupational History    Not on file.      Social History Main Topics    Smoking status: Never Smoker    Smokeless tobacco: Never Used    Alcohol use No    Drug use: No    Sexual activity: Not on file     Other Topics Concern    Not on file     Social History Narrative     Prior Level of Function/Work/Activity:  Not working  Hobbies: walking/travel  Dominant Side:         RIGHT  Other Clinical Tests  X-ray: healing good, per pt. Previous Treatment Approach   Corrective brace and surgery  Current Medications:    Current Outpatient Prescriptions:     losartan-hydroCHLOROthiazide (HYZAAR) 100-12.5 mg per tablet, Take 1 Tab by mouth daily. Indications: hypertension, Disp: , Rfl:      Date Last Reviewed:  9/12/2018   EXAMINATION:   Observation/Orthostatic Postural Assessment:  Assessed @ Initial Visit:      Gait Antalgic with no toe-off in terminal stance on L, foot flat, and L hip circumduction    Standing; primarily WB through R LE     Palpation:  Assessed @ Initial Visit: Tenderness to palpation along achilles T with restrictions noted. Tenderness and restriction through plantar fascia and talocrual joint.   Surgical scars present good healing, however restricted in mobility/pliability                     AROM/PROM         Joint: Eval Date: 8/30/18  Re-Assess Date:  Re-Assess Date:    Active ROM RIGHT LEFT RIGHT LEFT RIGHT LEFT   Ankle Dorsiflexion 5 0       Ankle Plantarflexion 45 32       Ankle Inversion 6 0       Ankle Eversion 4 8                         Passive ROM         Ankle Dorsiflexion  1       Ankle Plantarflexion  32       Ankle Inversion  0       Ankle Eversion  8         Strength:     Eval Date: 8/30/18  Re-Assess Date:  Re-Assess Date:      RIGHT LEFT RIGHT LEFT RIGHT LEFT   Ankle Dorsiflexion 5/5 4/5       Ankle Plantarflexion 5/5 4+/5       Ankle Inversion 5/5 4/5       Ankle Eversion 5/5 4/5       Knee Flexion 5/5 5/5       Knee Extension 5/5 5/5                  Joint Mobility Eval Date: 8/30/18  Re-Assess Date:  Re-Assess Date:     RIGHT LEFT RIGHT LEFT RIGHT LEFT   Subtalar  hypomobile       Talocrual  hypomobile          Special Tests: Assessed @ Initial Visit:     Neurological Screen: Assessed @ Initial Visit No radiating symptoms down leg    Functional Mobility:  Assessed @ Initial Visit Limited tolerance of walking and standing  Heel/Toe walking= unable  Calf Raise (Dbl/Single)= Dbl unable  Balance:  Assessed @ Initial Visit   Single leg stance: R= 1-5seconds  L= 0 seconds   Body Structures Involved:  1. Bones  2. Joints  3. Muscles  4. Ligaments Body Functions Affected:  1. Sensory/Pain  2. Neuromusculoskeletal  3. Movement Related Activities and Participation Affected:  1. Mobility  2. Self Care   CLINICAL DECISION MAKING:   Tool Used: FOOT AND ANKLE ABILITY MEASURE (FAAM)  Score:  Initial: 34/84 Most Recent: X (Date: -- )   Interpretation of Score: For the \"Activities of Daily Living\", there are 21 questions each scored on a 5 point scale with 0 representing \"Unable to do\" and 4 representing \"No difficulty\". The lower the score, the greater the functional disability. 84/84 represents no disability. Minimal detectable change is 5.7 points. With the addition of the 8 questions in the \"Sports Subscale,\" there are 29 questions, each scored on a 5 point scale with 0 representing \"Unable to do\" and 4 representing \"No difficulty\". The lower the score, the greater the functional disability. 116/116 represents no disability. Minimal detectable change is 12.3 points. Activities of Daily Living:  Score 84 83-68 67-51 50-34 33-18 17-1 0   Modifier CH CI CJ CK CL CM CN     Activities of Daily Living + Sports Subscale:  Score 116 115-94 93-71 70-47 46-24 23-1 0   Modifier CH CI CJ CK CL CM CN     ?  Mobility - Walking and Moving Around:     - CURRENT STATUS: CL - 60%-79% impaired, limited or restricted    - GOAL STATUS: CJ - 20%-39% impaired, limited or restricted    - D/C STATUS:  ---------------To be determined---------------      Medical Necessity:   · Patient is expected to demonstrate progress in strength, range of motion, balance, coordination and functional technique to increase independence with functional mobility at home to perform household duties and improve safety during ascending/descending stairs, walking on uneven ground, and squatting. .  Reason for Services/Other Comments:  · Patient requires manual therapeutic interventions as well as thereapeutic exercises/activities and gait training to improve patients saftey with ambulation in the community and functional mobility in and out of home. .   TREATMENT:   (In addition to Assessment/Re-Assessment sessions the following treatments were rendered)      · Pre-Treatment Pain/ Symptoms: Pt reports he is doing good and the foot feeling great  0/10       THERAPEUTIC EXERCISE: (15 minutes):  Exercises per grid below to improve mobility, strength and balance. Required minimal visual and verbal cues to promote proper body alignment, promote proper body posture and promote proper body mechanics. Progressed resistance, range, repetitions and complexity of movement as indicated. Date:  8/30/18 Date:  9/5/18 Date:  9/7/18 Date  9/10/18 Date  9/12/18   Exercise Parameters Parameters Parameters     Education POC, PT goals, HEP, ICE       Plantar fascia stretch 3x30\"  3x30\"     Calf stretch  Standing 5x10\"  3x30\" and HS stretch 3x30\" 3x30\"   Towel curls 30x 30x 30x     Seated calf raises 30x   10x    Wobble board  Seated 30x fwd/backward  30x side/side Seated 30x fwd/backward  30x side/side Seated 30x fwd/backward  30x side/side Standing 4 mins for balance fwd/backward   Plantar flex  Green band 30x Green band 30x     Bike  8 mins level4 10 mins level 4 10 mins       [email or web address]    Therapeutic Activity: (15 minutes):      Activity Date:  9/5/18 Date:  9/7/18 Date:  9/10/18 Date  9/12/18   Exercise Parameters Parameters Parameters    STS No UE support 2x10;equal WB  20x L foot back for increase WB     Step overs Heel/toe pattern 20x each   Cone, step-to pattern; 10ft 8x   Cone taps  2x10 for SL balance 20x for SL balance 20x for SL balance   Seated heel raises  30x 20x    Tight rope walking   20ft 4x 30ft 4x   Lunge on step   10x10\" for tibial translation 10x10\" for tibial translation   Gait training   50ft for heel/toe and hip/knee flex    Heel/toe walking    15ft     Neuromuscular Education:      Manual Therapy Interventions: (15 minutes): Manual interventions per grid below performed to improve joint/soft tissue mobility and ROM to improve ability to perform ADLs. Patient responded well to all manual interventions with no significant increase in pain/symptoms. Improved ankle/foot ROM demonstrated following interventions and decrease in pain scale listed below. Manual Intervention Date:  8/30/18 Date:  9/5/18 Date:  9/7/18 Date  9/10/18 Date  9/12/18   Soft tissue mobilization Joint Mobility Parameters Parameters Parameters      Talocrual A/P Grade III A/P Grade III A/P grade III A/P grade III A/P grade III    Subtalar Med/lat grade III Med/lat Grade III-IV Med/lat grade III-IV Med/lst grade III-IV Med/lat grade III   Plantar fascia  Release  release release release   Gastroc/soleus   Strumming/release Strumming/release strumming                                 MODALITIES: (0 minutes):        *  Cold Pack Therapy in order to provide analgesia and reduce inflammation and edema. Location= L ankle  Skin intact pre and post treatment with no adverse symptoms    Treatment/Session Assessment: Improved balance and ankle strength/stability noted with cone taps and step overs, as he demonstrated decrease in sway and LOB. Pt progressing well and continues to respond very well to all manual interventions. · Pain: Post Session report:  Pain: 4/10      GOOD ·   Compliance with Program/Exercises: Compliant  · Recommendations/Intent for next treatment session: \"Next visit will focus on advancements to more challenging activities and reduction in assistance provided\".     Future Appointments  Date Time Provider Tay [place] 9/14/2018 9:30 AM Venessa Russell DPT [facility]   9/17/2018 9:30 AM Venessa Russell DPT [facility]   9/19/2018 9:30 AM Sayda Killian [facility]   9/21/2018 9:30 AM Sayda Killian [facility]   9/24/2018 9:30 AM Sayda Killian [facility]   9/26/2018 9:30 AM Saydadominguez Killian [facility]   9/28/2018 9:30 AM Irais Killian [facility]               Total Treatment Duration: 45 mins  PT Patient Time In/Time Out  Time In: 0945  Time Out: 825 [facility]

## 2018-09-14 ENCOUNTER — HOSPITAL ENCOUNTER (OUTPATIENT)
Dept: PHYSICAL THERAPY | Age: 48
Discharge: HOME | End: 2018-09-14
Payer: COMMERCIAL

## 2018-09-14 PROCEDURE — 97110 THERAPEUTIC EXERCISES: CPT

## 2018-09-14 PROCEDURE — 97140 MANUAL THERAPY 1/> REGIONS: CPT

## 2018-09-14 PROCEDURE — 97530 THERAPEUTIC ACTIVITIES: CPT

## 2018-09-14 NOTE — PROGRESS NOTES
Óscar Abreu  : 1970      Payor: Camelia Nunez / Plan: Lexie Wilson RPN / Product Type: Commerical /  [address] at [place] [address]. [facility], [address] [place] [address], [address] [place] [address]  [phone]   [phone]        OUTPATIENT PHYSICAL THERAPY Daily Note, Visit # 6 :2018    ICD-10: Treatment Diagnosis:   Stiffness of left ankle, not elsewhere classified (M25.672)  Other abnormalities of gait and mobility (R26.89)  Pain in left ankle and joints of left foot (M25.572)  Effusion, left ankle (M25.472)  Precautions/Allergies:   Review of patient's allergies indicates no known allergies. Fall Risk Score: 2 (? 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 Martina Sneed presents to physical therapy s/p Left ankle exostectomy, Left triple arthrodesis, subtalar joint malunion osteotomy, and Left Achilles lengthening  on 18. He demonstrates L ankle/foot effusion, stiffness, antalgic gait, decreased strength, ROM, joint mobility, and functional mobility limiting his ability to complete basic ADLs. These S/S are consistent with L ankle stiffness, effusion, and pain, and antalgic gait. Patient will benefit from skilled physical therapy for manual therapeutic techniques (as appropriate), therapeutic exercises and activities, neuromuscular re-education, balance training and comprehensive home exercises program to address current impairments and functional limitations. Óscar Abreu will benefit from skilled PT (medically necessary) in order to address above deficits affecting participation in basic ADLs and overall functional tolerance.     PROBLEM LIST (Impacting functional limitations):   1. Decreased Strength  2. Decreased Joint mobility/ROM  3. Decreased ADL/Functional Activities  4. Decreased Transfer Abilities  5. Decreased Ambulation Ability/Technique  6. Decreased Balance  7. Increased Pain  8. Decreased Activity Tolerance  9. Decreased [place] with Home Exercise Program INTERVENTIONS PLANNED:   1. Balance Exercise  2. Bed Mobility  3. Cold  4. Family Education  5. Gait Training  6. Heat  7. Home Exercise Program (HEP)  8. Manual Therapy  9. Neuromuscular Re-education/Strengthening  10. Range of Motion (ROM)  11. Therapeutic Activites  12. Therapeutic Exercise/Strengthening  13. Transfer Training  14. Ultrasound (US)   TREATMENT PLAN:  Effective Dates: 8/30/18 TO 10/29/2018 (60 days). Frequency/Duration: 3 times a week for 60 Days  SHORT-TERM FUNCTIONAL GOALS: Time Frame: 4 weeks  Mahin Beltran will be compliant with home exercise program within 4 weeks in order to improve active participation with management of patient's symptoms and/or functional deficits. Mahin Beltran will report <=3/10 pain with walking >15 minutes in order to participate in daily exercise and daily activities  Mahin Beltran will be able to stand >60 minutes with <2/10 pain to feet in order to participate in household duties without issues/compromise. Mahin Beltran will report being able to sleep at night without waking up secondary to foot/ankle pain. Mahin Beltran  will improve ankle PF/DF active ROM from 32/0 to [place]  will improve ankle strength from 4/5 to 4+/5 to improve tolerance of ADLs. Mahin Beltran will be able to ascend/descend >8 steps with reciprocal pattern to demonstrate improvement in functional mobility at home. DISCHARGE GOALS: Time Frame: 8 weeks  Mahin Beltran will be independent with home exercise program within 8 weeks in order to improve independence with management of patient's symptoms and/or functional deficits.    Mahin Beltran will report no pain with walking affecting participation in activities of daily living. Gely Younger will be able to stand >60 minutes without onset of foot pain. Gely Younger will be report improved score on FAAM from 34/84 to >60/84 to improve functional independence. HISTORY:   History of Present Injury/Illness (Reason for Referral): Gely Younger presents to physical therapy s/p L ankle/foot reconstruction approximately 8 weeks ago. He states he broke his ankle 20 years ago but never sought treatment for it. He states it healed \"crooked\" so he has been walking on the side of his foot (medial) and after a while it began to really hurt him. He states last year he sought care for it and was placed in a boot/cast for a while to re-align his foot, however that failed so he had it surgically reconstructed and referred to PT.     -Present symptoms/complaints (on day of evaluation)   Pain Scale:  · Current: 5/10  · Best: 1/10  · Worst: 6/10    · Aggravating factors: walking long distances, going up/down steps, squatting, raising up on toes,    · Relieving factors: rest      Past Medical History/Comorbidities:   Mr. Keven Becker  has a past medical history of Hypertension. Mr. Keven Becker  has a past surgical history that includes hx hernia repair (1998) and hx colonoscopy., L ankle reconstruction (2018)    Active Ambulatory Problems     Diagnosis Date Noted    No Active Ambulatory Problems     Resolved Ambulatory Problems     Diagnosis Date Noted    No Resolved Ambulatory Problems     Past Medical History:   Diagnosis Date    Hypertension      Social History/Living Environment:        Social History     Social History    Marital status:      Spouse name: N/A    Number of children: N/A    Years of education: N/A     Occupational History    Not on file.      Social History Main Topics    Smoking status: Never Smoker    Smokeless tobacco: Never Used    Alcohol use No    Drug use: No    Sexual activity: Not on file Other Topics Concern    Not on file     Social History Narrative     Prior Level of Function/Work/Activity:  Not working  Hobbies: walking/travel  Dominant Side:         RIGHT  Other Clinical Tests  X-ray: healing good, per pt. Previous Treatment Approach   Corrective brace and surgery  Current Medications:    Current Outpatient Prescriptions:     losartan-hydroCHLOROthiazide (HYZAAR) 100-12.5 mg per tablet, Take 1 Tab by mouth daily. Indications: hypertension, Disp: , Rfl:      Date Last Reviewed:  9/14/2018   EXAMINATION:   Observation/Orthostatic Postural Assessment:  Assessed @ Initial Visit:      Gait Antalgic with no toe-off in terminal stance on L, foot flat, and L hip circumduction    Standing; primarily WB through R LE     Palpation:  Assessed @ Initial Visit: Tenderness to palpation along achilles T with restrictions noted. Tenderness and restriction through plantar fascia and talocrual joint.   Surgical scars present good healing, however restricted in mobility/pliability                     AROM/PROM         Joint: Eval Date: 8/30/18  Re-Assess Date:  Re-Assess Date:    Active ROM RIGHT LEFT RIGHT LEFT RIGHT LEFT   Ankle Dorsiflexion 5 0       Ankle Plantarflexion 45 32       Ankle Inversion 6 0       Ankle Eversion 4 8                         Passive ROM         Ankle Dorsiflexion  1       Ankle Plantarflexion  32       Ankle Inversion  0       Ankle Eversion  8         Strength:     Eval Date: 8/30/18  Re-Assess Date:  Re-Assess Date:      RIGHT LEFT RIGHT LEFT RIGHT LEFT   Ankle Dorsiflexion 5/5 4/5       Ankle Plantarflexion 5/5 4+/5       Ankle Inversion 5/5 4/5       Ankle Eversion 5/5 4/5       Knee Flexion 5/5 5/5       Knee Extension 5/5 5/5                  Joint Mobility Eval Date: 8/30/18  Re-Assess Date:  Re-Assess Date:     RIGHT LEFT RIGHT LEFT RIGHT LEFT   Subtalar  hypomobile       Talocrual  hypomobile          Special Tests: Assessed @ Initial Visit:     Neurological Screen: Assessed @ Initial Visit No radiating symptoms down leg    Functional Mobility:  Assessed @ Initial Visit Limited tolerance of walking and standing  Heel/Toe walking= unable  Calf Raise (Dbl/Single)= Dbl unable  Balance:  Assessed @ Initial Visit   Single leg stance: R= 1-5seconds  L= 0 seconds   Body Structures Involved:  1. Bones  2. Joints  3. Muscles  4. Ligaments Body Functions Affected:  1. Sensory/Pain  2. Neuromusculoskeletal  3. Movement Related Activities and Participation Affected:  1. Mobility  2. Self Care   CLINICAL DECISION MAKING:   Tool Used: FOOT AND ANKLE ABILITY MEASURE (FAAM)  Score:  Initial: 34/84 Most Recent: X (Date: -- )   Interpretation of Score: For the \"Activities of Daily Living\", there are 21 questions each scored on a 5 point scale with 0 representing \"Unable to do\" and 4 representing \"No difficulty\". The lower the score, the greater the functional disability. 84/84 represents no disability. Minimal detectable change is 5.7 points. With the addition of the 8 questions in the \"Sports Subscale,\" there are 29 questions, each scored on a 5 point scale with 0 representing \"Unable to do\" and 4 representing \"No difficulty\". The lower the score, the greater the functional disability. 116/116 represents no disability. Minimal detectable change is 12.3 points. Activities of Daily Living:  Score 84 83-68 67-51 50-34 33-18 17-1 0   Modifier CH CI CJ CK CL CM CN     Activities of Daily Living + Sports Subscale:  Score 116 115-94 93-71 70-47 46-24 23-1 0   Modifier CH CI CJ CK CL CM CN     ?  Mobility - Walking and Moving Around:     - CURRENT STATUS: CL - 60%-79% impaired, limited or restricted    - GOAL STATUS: CJ - 20%-39% impaired, limited or restricted    - D/C STATUS:  ---------------To be determined---------------      Medical Necessity:   · Patient is expected to demonstrate progress in strength, range of motion, balance, coordination and functional technique to increase independence with functional mobility at home to perform household duties and improve safety during ascending/descending stairs, walking on uneven ground, and squatting. .  Reason for Services/Other Comments:  · Patient requires manual therapeutic interventions as well as thereapeutic exercises/activities and gait training to improve patients saftey with ambulation in the community and functional mobility in and out of home. .   TREATMENT:   (In addition to Assessment/Re-Assessment sessions the following treatments were rendered)      · Pre-Treatment Pain/ Symptoms: Pain is around 5/10 pain. He's doing exercises at home. THERAPEUTIC EXERCISE: (10 minutes):  Exercises per grid below to improve mobility, strength and balance. Required minimal visual and verbal cues to promote proper body alignment, promote proper body posture and promote proper body mechanics. Progressed resistance, range, repetitions and complexity of movement as indicated. Date:  8/30/18 Date:  9/5/18 Date:  9/7/18 Date  9/10/18 Date  9/12/18 Date   9/14/18   Exercise Parameters Parameters Parameters      Education POC, PT goals, HEP, ICE        Plantar fascia stretch 3x30\"  3x30\"      Calf stretch  Standing 5x10\"  3x30\" and HS stretch 3x30\" 3x30\" 3x30\" on slant board   Towel curls 30x 30x 30x      Seated calf raises 30x   10x     Wobble board  Seated 30x fwd/backward  30x side/side Seated 30x fwd/backward  30x side/side Seated 30x fwd/backward  30x side/side Standing 4 mins for balance fwd/backward Standing 2 mins for balance fwd/backward   Plantar flex  Green band 30x Green band 30x      Bike  8 mins level4 10 mins level 4 10 mins  Level 7 5 minutes   RDL Single leg      Picking up cone 13x               [employer] Portal    Therapeutic Activity: (15 minutes):      Activity Date:  9/5/18 Date:  9/7/18 Date:  9/10/18 Date  9/12/18 Date:  9/14/18   Exercise Parameters Parameters Parameters     STS No UE support 2x10;equal WB  20x L foot back for increase WB      Step overs Heel/toe pattern 20x each   Cone, step-to pattern; 10ft 8x Cone, step-to pattern; 10ft 8x    Side to side lateral 8x. Cone taps  2x10 for SL balance 20x for SL balance 20x for SL balance 20x slow    Seated heel raises  30x 20x     Tight rope walking   20ft 4x 30ft 4x Heel-toe 30'   Lunge on step   10x10\" for tibial translation 10x10\" for tibial translation    Gait training   50ft for heel/toe and hip/knee flex     Heel/toe walking    15ft    SL Balance rolling ball with R LE     Forward and back 10x (CGA by PT)   SLS      NV     Neuromuscular Education:      Manual Therapy Interventions: (15 minutes): Manual interventions per grid below performed to improve joint/soft tissue mobility and ROM to improve ability to perform ADLs. Patient responded well to all manual interventions with no significant increase in pain/symptoms. Improved ankle/foot ROM demonstrated following interventions and decrease in pain scale listed below. Manual Intervention Date:  8/30/18 Date:  9/5/18 Date:  9/7/18 Date  9/10/18 Date  9/12/18 9/14/18   Soft tissue mobilization Joint Mobility Parameters Parameters Parameters       Talocrual A/P Grade III A/P Grade III A/P grade III A/P grade III A/P grade III A/P grade III med/lat grade III     Subtalar Med/lat grade III Med/lat Grade III-IV Med/lat grade III-IV Med/lst grade III-IV Med/lat grade III    Plantar fascia  Release  release release release    Gastroc/soleus   Strumming/release Strumming/release strumming                                      MODALITIES: (0 minutes):        *  Cold Pack Therapy in order to provide analgesia and reduce inflammation and edema. Location= L ankle  Skin intact pre and post treatment with no adverse symptoms    Treatment/Session Assessment: Overall, progressing towards goals. Added SL RDL and ball rolls to challenge balance and improve proprioception.       · Pain: Post Session report:  Pain: 2/10      GOOD ·   Compliance with Program/Exercises: Compliant  · Recommendations/Intent for next treatment session: \"Next visit will focus on advancements to more challenging activities and reduction in assistance provided\".     Future Appointments  Date Time Provider Tay Aguayo   9/17/2018 9:30 AM Naomi Barillas DPT [facility]   9/19/2018 9:30 AM Zev Councilman Teddi [facility]   9/21/2018 9:30 AM Zev Councilman Teddi [facility]   9/24/2018 9:30 AM Toy Councilman Teddi Starling [facility]   9/26/2018 9:30 AM Toy Councilman Teddi Starling [facility]   [facility]28/2018 [facility]       Total Treatment Duration: 40 mins  PT Patient Time In/Time Out  Time In: 0930  Time Out: 5986 Allie Maher DPT

## 2018-09-17 ENCOUNTER — HOSPITAL ENCOUNTER (OUTPATIENT)
Dept: PHYSICAL THERAPY | Age: 48
Discharge: HOME | End: 2018-09-17
Payer: COMMERCIAL

## 2018-09-17 PROCEDURE — 97140 MANUAL THERAPY 1/> REGIONS: CPT

## 2018-09-17 PROCEDURE — 97110 THERAPEUTIC EXERCISES: CPT

## 2018-09-17 PROCEDURE — 97530 THERAPEUTIC ACTIVITIES: CPT

## 2018-09-17 NOTE — PROGRESS NOTES
Cari Peck  : 1970      Payor: Hillary Connelly / Plan: Iraida Montoya RPN / Product Type: Commerical /  [address] [facility], [address] [place] [address], [address] [place] [address]  [phone]   [phone]        OUTPATIENT PHYSICAL THERAPY Daily Note, Visit # 7 :2018    ICD-10: Treatment Diagnosis:   Stiffness of left ankle, not elsewhere classified (M25.672)  Other abnormalities of gait and mobility (R26.89)  Pain in left ankle and joints of left foot (M25.572)  Effusion, left ankle (M25.472)  Precautions/Allergies:   Review of patient's allergies indicates no known allergies. Fall Risk Score: 2 (? 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 Mckay Sykes presents to physical therapy s/p Left ankle exostectomy, Left triple arthrodesis, subtalar joint malunion osteotomy, and Left Achilles lengthening  on 18. He demonstrates L ankle/foot effusion, stiffness, antalgic gait, decreased strength, ROM, joint mobility, and functional mobility limiting his ability to complete basic ADLs. These S/S are consistent with L ankle stiffness, effusion, and pain, and antalgic gait. Patient will benefit from skilled physical therapy for manual therapeutic techniques (as appropriate), therapeutic exercises and activities, neuromuscular re-education, balance training and comprehensive home exercises program to address current impairments and functional limitations. Cari Peck will benefit from skilled PT (medically necessary) in order to address above deficits affecting participation in basic ADLs and overall functional tolerance.     PROBLEM LIST (Impacting functional limitations):   1. Decreased Strength  2. Decreased Joint mobility/ROM  3. Decreased ADL/Functional Activities  4. Decreased Transfer Abilities  5. Decreased Ambulation Ability/Technique  6. Decreased Balance  7. Increased Pain  8. Decreased Activity Tolerance  9. Decreased [place] with Home Exercise Program INTERVENTIONS PLANNED:   1. Balance Exercise  2. Bed Mobility  3. Cold  4. Family Education  5. Gait Training  6. Heat  7. Home Exercise Program (HEP)  8. Manual Therapy  9. Neuromuscular Re-education/Strengthening  10. Range of Motion (ROM)  11. Therapeutic Activites  12. Therapeutic Exercise/Strengthening  13. Transfer Training  14. Ultrasound (US)   TREATMENT PLAN:  Effective Dates: 8/30/18 TO 10/29/2018 (60 days). Frequency/Duration: 3 times a week for 60 Days  SHORT-TERM FUNCTIONAL GOALS: Time Frame: 4 weeks  Africa Garvey will be compliant with home exercise program within 4 weeks in order to improve active participation with management of patient's symptoms and/or functional deficits. Africa Garvey will report <=3/10 pain with walking >15 minutes in order to participate in daily exercise and daily activities  Africa Garvey will be able to stand >60 minutes with <2/10 pain to feet in order to participate in household duties without issues/compromise. Africa Garvey will report being able to sleep at night without waking up secondary to foot/ankle pain. Africa Garvey  will improve ankle PF/DF active ROM from 32/0 to [place]  will improve ankle strength from 4/5 to 4+/5 to improve tolerance of ADLs. Africa Garvey will be able to ascend/descend >8 steps with reciprocal pattern to demonstrate improvement in functional mobility at home. DISCHARGE GOALS: Time Frame: 8 weeks  Africa Garvey will be independent with home exercise program within 8 weeks in order to improve independence with management of patient's symptoms and/or functional deficits.    Africa Garvey will report no pain with walking affecting participation in activities of daily living. Ambreen Parsons will be able to stand >60 minutes without onset of foot pain. Ambreen Parsons will be report improved score on FAAM from 34/84 to >60/84 to improve functional independence. HISTORY:   History of Present Injury/Illness (Reason for Referral): Ambreen Parsons presents to physical therapy s/p L ankle/foot reconstruction approximately 8 weeks ago. He states he broke his ankle 20 years ago but never sought treatment for it. He states it healed \"crooked\" so he has been walking on the side of his foot (medial) and after a while it began to really hurt him. He states last year he sought care for it and was placed in a boot/cast for a while to re-align his foot, however that failed so he had it surgically reconstructed and referred to PT.     -Present symptoms/complaints (on day of evaluation)   Pain Scale:  · Current: 5/10  · Best: 1/10  · Worst: 6/10    · Aggravating factors: walking long distances, going up/down steps, squatting, raising up on toes,    · Relieving factors: rest      Past Medical History/Comorbidities:   Mr. Catalino Roth  has a past medical history of Hypertension. Mr. Catalino Roth  has a past surgical history that includes hx hernia repair (1998) and hx colonoscopy., L ankle reconstruction (2018)    Active Ambulatory Problems     Diagnosis Date Noted    No Active Ambulatory Problems     Resolved Ambulatory Problems     Diagnosis Date Noted    No Resolved Ambulatory Problems     Past Medical History:   Diagnosis Date    Hypertension      Social History/Living Environment:        Social History     Social History    Marital status:      Spouse name: N/A    Number of children: N/A    Years of education: N/A     Occupational History    Not on file.      Social History Main Topics    Smoking status: Never Smoker    Smokeless tobacco: Never Used    Alcohol use No    Drug use: No    Sexual activity: Not on file Other Topics Concern    Not on file     Social History Narrative     Prior Level of Function/Work/Activity:  Not working  Hobbies: walking/travel  Dominant Side:         RIGHT  Other Clinical Tests  X-ray: healing good, per pt. Previous Treatment Approach   Corrective brace and surgery  Current Medications:    Current Outpatient Prescriptions:     losartan-hydroCHLOROthiazide (HYZAAR) 100-12.5 mg per tablet, Take 1 Tab by mouth daily. Indications: hypertension, Disp: , Rfl:      Date Last Reviewed:  9/17/2018   EXAMINATION:   Observation/Orthostatic Postural Assessment:  Assessed @ Initial Visit:      Gait Antalgic with no toe-off in terminal stance on L, foot flat, and L hip circumduction    Standing; primarily WB through R LE     Palpation:  Assessed @ Initial Visit: Tenderness to palpation along achilles T with restrictions noted. Tenderness and restriction through plantar fascia and talocrual joint.   Surgical scars present good healing, however restricted in mobility/pliability                     AROM/PROM         Joint: Eval Date: 8/30/18  Re-Assess Date:  Re-Assess Date:    Active ROM RIGHT LEFT RIGHT LEFT RIGHT LEFT   Ankle Dorsiflexion 5 0       Ankle Plantarflexion 45 32       Ankle Inversion 6 0       Ankle Eversion 4 8                         Passive ROM         Ankle Dorsiflexion  1       Ankle Plantarflexion  32       Ankle Inversion  0       Ankle Eversion  8         Strength:     Eval Date: 8/30/18  Re-Assess Date:  Re-Assess Date:      RIGHT LEFT RIGHT LEFT RIGHT LEFT   Ankle Dorsiflexion 5/5 4/5       Ankle Plantarflexion 5/5 4+/5       Ankle Inversion 5/5 4/5       Ankle Eversion 5/5 4/5       Knee Flexion 5/5 5/5       Knee Extension 5/5 5/5                  Joint Mobility Eval Date: 8/30/18  Re-Assess Date:  Re-Assess Date:     RIGHT LEFT RIGHT LEFT RIGHT LEFT   Subtalar  hypomobile       Talocrual  hypomobile          Special Tests: Assessed @ Initial Visit:     Neurological Screen: Assessed @ Initial Visit No radiating symptoms down leg    Functional Mobility:  Assessed @ Initial Visit Limited tolerance of walking and standing  Heel/Toe walking= unable  Calf Raise (Dbl/Single)= Dbl unable  Balance:  Assessed @ Initial Visit   Single leg stance: R= 1-5seconds  L= 0 seconds   Body Structures Involved:  1. Bones  2. Joints  3. Muscles  4. Ligaments Body Functions Affected:  1. Sensory/Pain  2. Neuromusculoskeletal  3. Movement Related Activities and Participation Affected:  1. Mobility  2. Self Care   CLINICAL DECISION MAKING:   Tool Used: FOOT AND ANKLE ABILITY MEASURE (FAAM)  Score:  Initial: 34/84 Most Recent: X (Date: -- )   Interpretation of Score: For the \"Activities of Daily Living\", there are 21 questions each scored on a 5 point scale with 0 representing \"Unable to do\" and 4 representing \"No difficulty\". The lower the score, the greater the functional disability. 84/84 represents no disability. Minimal detectable change is 5.7 points. With the addition of the 8 questions in the \"Sports Subscale,\" there are 29 questions, each scored on a 5 point scale with 0 representing \"Unable to do\" and 4 representing \"No difficulty\". The lower the score, the greater the functional disability. 116/116 represents no disability. Minimal detectable change is 12.3 points. Activities of Daily Living:  Score 84 83-68 67-51 50-34 33-18 17-1 0   Modifier CH CI CJ CK CL CM CN     Activities of Daily Living + Sports Subscale:  Score 116 115-94 93-71 70-47 46-24 23-1 0   Modifier CH CI CJ CK CL CM CN     ?  Mobility - Walking and Moving Around:     - CURRENT STATUS: CL - 60%-79% impaired, limited or restricted    - GOAL STATUS: CJ - 20%-39% impaired, limited or restricted    - D/C STATUS:  ---------------To be determined---------------      Medical Necessity:   · Patient is expected to demonstrate progress in strength, range of motion, balance, coordination and functional technique to increase independence with functional mobility at home to perform household duties and improve safety during ascending/descending stairs, walking on uneven ground, and squatting. .  Reason for Services/Other Comments:  · Patient requires manual therapeutic interventions as well as thereapeutic exercises/activities and gait training to improve patients saftey with ambulation in the community and functional mobility in and out of home. .   TREATMENT:   (In addition to Assessment/Re-Assessment sessions the following treatments were rendered)      · Pre-Treatment Pain/ Symptoms: Pain is around 3/10 pain. He's doing exercises at home. He is monitoring swelling and icing as needed. THERAPEUTIC EXERCISE: (10 minutes):  Exercises per grid below to improve mobility, strength and balance. Required minimal visual and verbal cues to promote proper body alignment, promote proper body posture and promote proper body mechanics. Progressed resistance, range, repetitions and complexity of movement as indicated.      Date:  8/30/18 Date:  9/5/18 Date:  9/7/18 Date  9/10/18 Date  9/12/18 Date   9/14/18 Date:  9/17/18   Exercise Parameters Parameters Parameters       Education POC, PT goals, HEP, ICE         Plantar fascia stretch 3x30\"  3x30\"       Calf stretch  Standing 5x10\"  3x30\" and HS stretch 3x30\" 3x30\" 3x30\" on slant board 3x30\" on slant board   Towel curls 30x 30x 30x       Seated calf raises 30x   10x      Wobble board  Seated 30x fwd/backward  30x side/side Seated 30x fwd/backward  30x side/side Seated 30x fwd/backward  30x side/side Standing 4 mins for balance fwd/backward Standing 2 mins for balance fwd/backward Standing 2 mins for balance fwd/backward  Med/lateral     Plantar flex  Green band 30x Green band 30x       Bike  8 mins level4 10 mins level 4 10 mins  Level 7 5 minutes Level 7 5 minutes   RDL Single leg      Picking up cone 13x Picking up cone 13x                [employer] Portal    Therapeutic Activity: (15 minutes): Activity Date:  9/5/18 Date:  9/7/18 Date:  9/10/18 Date  9/12/18 Date:  9/14/18 Date  9/17/18   Exercise Parameters Parameters Parameters      STS No UE support 2x10;equal WB  20x L foot back for increase WB       Step overs Heel/toe pattern 20x each   Cone, step-to pattern; 10ft 8x Cone, step-to pattern; 10ft 8x    Side to side lateral 8x. Cone, step-to pattern; 10ft 8x    Side to side lateral 8x. Cone taps  2x10 for SL balance 20x for SL balance 20x for SL balance 20x slow  25 x   Seated heel raises  30x 20x      Tight rope walking   20ft 4x 30ft 4x Heel-toe 30' Heel toe 30'   Lunge on step   10x10\" for tibial translation 10x10\" for tibial translation     Gait training   50ft for heel/toe and hip/knee flex      Heel/toe walking    15ft     SL Balance rolling ball with R LE     Forward and back 10x (CGA by PT) Forward and back 10x (CGA by PT)   SLS      NV 30x3     Neuromuscular Education:      Manual Therapy Interventions: (15 minutes): Manual interventions per grid below performed to improve joint/soft tissue mobility and ROM to improve ability to perform ADLs. Patient responded well to all manual interventions with no significant increase in pain/symptoms. Improved ankle/foot ROM demonstrated following interventions and decrease in pain scale listed below.                Manual Intervention Date:  8/30/18 Date:  9/5/18 Date:  9/7/18 Date  9/10/18 Date  9/12/18 9/14/18 9.17.18   Soft tissue mobilization Joint Mobility Parameters Parameters Parameters        Talocrual A/P Grade III A/P Grade III A/P grade III A/P grade III A/P grade III A/P grade III med/lat grade III  A/P grade III med/lat grade III     Subtalar Med/lat grade III Med/lat Grade III-IV Med/lat grade III-IV Med/lst grade III-IV Med/lat grade III     Plantar fascia  Release  release release release     Gastroc/soleus   Strumming/release Strumming/release strumming                                          MODALITIES: (0 minutes):        *  Cold Pack Therapy in order to provide analgesia and reduce inflammation and edema. Location= L ankle  Skin intact pre and post treatment with no adverse symptoms    Treatment/Session Assessment: Overall, progressing towards goals. Continued SL RDL and ball rolls to challenge balance and improve proprioception. May need vasopneumatic next session if swelling is an issue. · Pain: Post Session report:  Pain: 2/10      GOOD ·   Compliance with Program/Exercises: Compliant  · Recommendations/Intent for next treatment session: \"Next visit will focus on advancements to more challenging activities and reduction in assistance provided\".     Future Appointments  Date Time Provider Tay Aguayo   9/19/2018 9:30 AM Auther Oyster [facility]   9/24/2018 9:30 AM Brooklynn Ceballos [facility]   9/26/2018 9:30 AM Irais Ceballos [facility]   9/28/2018 9:30 AM rIais Ceballos [facility]       Total Treatment Duration: 40 mins  PT Patient Time In/Time Out  Time In: 0930  Time Out: 2230 [facility] Fleming DPT

## 2018-09-19 ENCOUNTER — HOSPITAL ENCOUNTER (OUTPATIENT)
Dept: PHYSICAL THERAPY | Age: 48
Discharge: HOME | End: 2018-09-19
Payer: COMMERCIAL

## 2018-09-19 PROCEDURE — 97530 THERAPEUTIC ACTIVITIES: CPT

## 2018-09-19 PROCEDURE — 97110 THERAPEUTIC EXERCISES: CPT

## 2018-09-19 NOTE — PROGRESS NOTES
Lonza Drought  : 1970      Payor: Samuel Diaz / Plan: Roy Ormond RPN / Product Type: Commerical /  [address] [facility] at [address] [place] [address]. [facility], [address] [place] [address]  [phone]   [phone]        OUTPATIENT PHYSICAL THERAPY Daily Note, Visit # 8 :2018    ICD-10: Treatment Diagnosis:   Stiffness of left ankle, not elsewhere classified (M25.672)  Other abnormalities of gait and mobility (R26.89)  Pain in left ankle and joints of left foot (M25.572)  Effusion, left ankle (M25.472)  Precautions/Allergies:   Review of patient's allergies indicates no known allergies. Fall Risk Score: 2 (? 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 Kym Clemons presents to physical therapy s/p Left ankle exostectomy, Left triple arthrodesis, subtalar joint malunion osteotomy, and Left Achilles lengthening  on 18. He demonstrates L ankle/foot effusion, stiffness, antalgic gait, decreased strength, ROM, joint mobility, and functional mobility limiting his ability to complete basic ADLs. These S/S are consistent with L ankle stiffness, effusion, and pain, and antalgic gait. Patient will benefit from skilled physical therapy for manual therapeutic techniques (as appropriate), therapeutic exercises and activities, neuromuscular re-education, balance training and comprehensive home exercises program to address current impairments and functional limitations. Lonza Drought will benefit from skilled PT (medically necessary) in order to address above deficits affecting participation in basic ADLs and overall functional tolerance.     PROBLEM LIST (Impacting functional limitations):   1. Decreased Strength  2. Decreased Joint mobility/ROM  3. Decreased ADL/Functional Activities  4. Decreased Transfer Abilities  5. Decreased Ambulation Ability/Technique  6. Decreased Balance  7. Increased Pain  8. Decreased Activity Tolerance  9. Decreased [place] with Home Exercise Program INTERVENTIONS PLANNED:   1. Balance Exercise  2. Bed Mobility  3. Cold  4. Family Education  5. Gait Training  6. Heat  7. Home Exercise Program (HEP)  8. Manual Therapy  9. Neuromuscular Re-education/Strengthening  10. Range of Motion (ROM)  11. Therapeutic Activites  12. Therapeutic Exercise/Strengthening  13. Transfer Training  14. Ultrasound (US)   TREATMENT PLAN:  Effective Dates: 8/30/18 TO 10/29/2018 (60 days). Frequency/Duration: 3 times a week for 60 Days  SHORT-TERM FUNCTIONAL GOALS: Time Frame: 4 weeks  Kolby Mas will be compliant with home exercise program within 4 weeks in order to improve active participation with management of patient's symptoms and/or functional deficits. Kolby Mas will report <=3/10 pain with walking >15 minutes in order to participate in daily exercise and daily activities  Kolby Drought will be able to stand >60 minutes with <2/10 pain to feet in order to participate in household duties without issues/compromise. Kolby Mas will report being able to sleep at night without waking up secondary to foot/ankle pain. Kolby Mas  will improve ankle PF/DF active ROM from 32/0 to [place]  will improve ankle strength from 4/5 to 4+/5 to improve tolerance of ADLs. Kolby Mas will be able to ascend/descend >8 steps with reciprocal pattern to demonstrate improvement in functional mobility at home. DISCHARGE GOALS: Time Frame: 8 weeks  Kolby Drought will be independent with home exercise program within 8 weeks in order to improve independence with management of patient's symptoms and/or functional deficits.    Kolby Mas will report no pain with walking affecting participation in activities of daily living. Ambreen Parsons will be able to stand >60 minutes without onset of foot pain. Ambreen Parsons will be report improved score on FAAM from 34/84 to >60/84 to improve functional independence. HISTORY:   History of Present Injury/Illness (Reason for Referral): Ambreen Parsons presents to physical therapy s/p L ankle/foot reconstruction approximately 8 weeks ago. He states he broke his ankle 20 years ago but never sought treatment for it. He states it healed \"crooked\" so he has been walking on the side of his foot (medial) and after a while it began to really hurt him. He states last year he sought care for it and was placed in a boot/cast for a while to re-align his foot, however that failed so he had it surgically reconstructed and referred to PT.     -Present symptoms/complaints (on day of evaluation)   Pain Scale:  · Current: 5/10  · Best: 1/10  · Worst: 6/10    · Aggravating factors: walking long distances, going up/down steps, squatting, raising up on toes,    · Relieving factors: rest      Past Medical History/Comorbidities:   Mr. Catalino Roth  has a past medical history of Hypertension. Mr. Catalino Roth  has a past surgical history that includes hx hernia repair (1998) and hx colonoscopy., L ankle reconstruction (2018)    Active Ambulatory Problems     Diagnosis Date Noted    No Active Ambulatory Problems     Resolved Ambulatory Problems     Diagnosis Date Noted    No Resolved Ambulatory Problems     Past Medical History:   Diagnosis Date    Hypertension      Social History/Living Environment:        Social History     Social History    Marital status:      Spouse name: N/A    Number of children: N/A    Years of education: N/A     Occupational History    Not on file.      Social History Main Topics    Smoking status: Never Smoker    Smokeless tobacco: Never Used    Alcohol use No    Drug use: No    Sexual activity: Not on file Other Topics Concern    Not on file     Social History Narrative     Prior Level of Function/Work/Activity:  Not working  Hobbies: walking/travel  Dominant Side:         RIGHT  Other Clinical Tests  X-ray: healing good, per pt. Previous Treatment Approach   Corrective brace and surgery  Current Medications:    Current Outpatient Prescriptions:     losartan-hydroCHLOROthiazide (HYZAAR) 100-12.5 mg per tablet, Take 1 Tab by mouth daily. Indications: hypertension, Disp: , Rfl:      Date Last Reviewed:  9/19/2018   EXAMINATION:   Observation/Orthostatic Postural Assessment:  Assessed @ Initial Visit:      Gait Antalgic with no toe-off in terminal stance on L, foot flat, and L hip circumduction    Standing; primarily WB through R LE     Palpation:  Assessed @ Initial Visit: Tenderness to palpation along achilles T with restrictions noted. Tenderness and restriction through plantar fascia and talocrual joint.   Surgical scars present good healing, however restricted in mobility/pliability                     AROM/PROM         Joint: Eval Date: 8/30/18  Re-Assess Date:  Re-Assess Date:    Active ROM RIGHT LEFT RIGHT LEFT RIGHT LEFT   Ankle Dorsiflexion 5 0       Ankle Plantarflexion 45 32       Ankle Inversion 6 0       Ankle Eversion 4 8                         Passive ROM         Ankle Dorsiflexion  1       Ankle Plantarflexion  32       Ankle Inversion  0       Ankle Eversion  8         Strength:     Eval Date: 8/30/18  Re-Assess Date:  Re-Assess Date:      RIGHT LEFT RIGHT LEFT RIGHT LEFT   Ankle Dorsiflexion 5/5 4/5       Ankle Plantarflexion 5/5 4+/5       Ankle Inversion 5/5 4/5       Ankle Eversion 5/5 4/5       Knee Flexion 5/5 5/5       Knee Extension 5/5 5/5                  Joint Mobility Eval Date: 8/30/18  Re-Assess Date:  Re-Assess Date:     RIGHT LEFT RIGHT LEFT RIGHT LEFT   Subtalar  hypomobile       Talocrual  hypomobile          Special Tests: Assessed @ Initial Visit:     Neurological Screen: Assessed @ Initial Visit No radiating symptoms down leg    Functional Mobility:  Assessed @ Initial Visit Limited tolerance of walking and standing  Heel/Toe walking= unable  Calf Raise (Dbl/Single)= Dbl unable  Balance:  Assessed @ Initial Visit   Single leg stance: R= 1-5seconds  L= 0 seconds   Body Structures Involved:  1. Bones  2. Joints  3. Muscles  4. Ligaments Body Functions Affected:  1. Sensory/Pain  2. Neuromusculoskeletal  3. Movement Related Activities and Participation Affected:  1. Mobility  2. Self Care   CLINICAL DECISION MAKING:   Tool Used: FOOT AND ANKLE ABILITY MEASURE (FAAM)  Score:  Initial: 34/84 Most Recent: X (Date: -- )   Interpretation of Score: For the \"Activities of Daily Living\", there are 21 questions each scored on a 5 point scale with 0 representing \"Unable to do\" and 4 representing \"No difficulty\". The lower the score, the greater the functional disability. 84/84 represents no disability. Minimal detectable change is 5.7 points. With the addition of the 8 questions in the \"Sports Subscale,\" there are 29 questions, each scored on a 5 point scale with 0 representing \"Unable to do\" and 4 representing \"No difficulty\". The lower the score, the greater the functional disability. 116/116 represents no disability. Minimal detectable change is 12.3 points. Activities of Daily Living:  Score 84 83-68 67-51 50-34 33-18 17-1 0   Modifier CH CI CJ CK CL CM CN     Activities of Daily Living + Sports Subscale:  Score 116 115-94 93-71 70-47 46-24 23-1 0   Modifier CH CI CJ CK CL CM CN     ?  Mobility - Walking and Moving Around:     - CURRENT STATUS: CL - 60%-79% impaired, limited or restricted    - GOAL STATUS: CJ - 20%-39% impaired, limited or restricted    - D/C STATUS:  ---------------To be determined---------------      Medical Necessity:   · Patient is expected to demonstrate progress in strength, range of motion, balance, coordination and functional technique to increase independence with functional mobility at home to perform household duties and improve safety during ascending/descending stairs, walking on uneven ground, and squatting. .  Reason for Services/Other Comments:  · Patient requires manual therapeutic interventions as well as thereapeutic exercises/activities and gait training to improve patients saftey with ambulation in the community and functional mobility in and out of home. .   TREATMENT:   (In addition to Assessment/Re-Assessment sessions the following treatments were rendered)      · Pre-Treatment Pain/ Symptoms: Doing great with 2/10 pain. THERAPEUTIC EXERCISE: (15 minutes):  Exercises per grid below to improve mobility, strength and balance. Required minimal visual and verbal cues to promote proper body alignment, promote proper body posture and promote proper body mechanics. Progressed resistance, range, repetitions and complexity of movement as indicated.      Date:  8/30/18 Date:  9/5/18 Date:  9/7/18 Date  9/10/18 Date  9/12/18 Date   9/14/18 Date:  9/17/18 Date  9/19/18   Exercise Parameters Parameters Parameters        Education POC, PT goals, HEP, ICE          Plantar fascia stretch 3x30\"  3x30\"        Calf stretch  Standing 5x10\"  3x30\" and HS stretch 3x30\" 3x30\" 3x30\" on slant board 3x30\" on slant board 3x30\" slant board   Towel curls 30x 30x 30x        Seated calf raises 30x   10x       Wobble board  Seated 30x fwd/backward  30x side/side Seated 30x fwd/backward  30x side/side Seated 30x fwd/backward  30x side/side Standing 4 mins for balance fwd/backward Standing 2 mins for balance fwd/backward Standing 2 mins for balance fwd/backward  Med/lateral   Standing 2 mins for balance fwd/backward  Med/lateral   Plantar flex  Green band 30x Green band 30x        Bike  8 mins level4 10 mins level 4 10 mins  Level 7 5 minutes Level 7 5 minutes Level 6 8 min   RDL Single leg      Picking up cone 13x Picking up cone 13x Picking up cone 13x [place]    Therapeutic Activity: (25 minutes): Activity Date:  9/5/18 Date:  9/7/18 Date:  9/10/18 Date  9/12/18 Date:  9/14/18 Date  9/17/18 Date  9/19/18   Exercise Parameters Parameters Parameters       STS No UE support 2x10;equal WB  20x L foot back for increase WB        Step overs Heel/toe pattern 20x each   Cone, step-to pattern; 10ft 8x Cone, step-to pattern; 10ft 8x    Side to side lateral 8x. Cone, step-to pattern; 10ft 8x    Side to side lateral 8x. Cone, step-to pattern with SL 3\" balance; 10ft 8x    Side to side lateral 8x. Cone taps  2x10 for SL balance 20x for SL balance 20x for SL balance 20x slow  25 x    Seated heel raises  30x 20x       Tight rope walking   20ft 4x 30ft 4x Heel-toe 30' Heel toe 30' Heel-toe 40ft   Lunge on step   10x10\" for tibial translation 10x10\" for tibial translation      Gait training   50ft for heel/toe and hip/knee flex       Heel/toe walking    15ft      SL Balance rolling ball with R LE     Forward and back 10x (CGA by PT) Forward and back 10x (CGA by PT) Forward and back 10x (SBA by PT)   SLS      NV 30x3    Step ups       4 inch L SL      Neuromuscular Education:      Manual Therapy Interventions: (0 minutes): Manual interventions per grid below performed to improve joint/soft tissue mobility and ROM to improve ability to perform ADLs. Patient responded well to all manual interventions with no significant increase in pain/symptoms. Improved ankle/foot ROM demonstrated following interventions and decrease in pain scale listed below.                Manual Intervention Date:  8/30/18 Date:  9/5/18 Date:  9/7/18 Date  9/10/18 Date  9/12/18 9/14/18 9.17.18    Soft tissue mobilization Joint Mobility Parameters Parameters Parameters         Talocrual A/P Grade III A/P Grade III A/P grade III A/P grade III A/P grade III A/P grade III med/lat grade III  A/P grade III med/lat grade III      Subtalar Med/lat grade III Med/lat Grade III-IV Med/lat grade III-IV Med/lst grade III-IV Med/lat grade III      Plantar fascia  Release  release release release      Gastroc/soleus   Strumming/release Strumming/release strumming                                              MODALITIES: (0 minutes):        *  Cold Pack Therapy in order to provide analgesia and reduce inflammation and edema. Location= L ankle  Skin intact pre and post treatment with no adverse symptoms    Treatment/Session Assessment: Continued difficulty with balance SL on L. Improved RDL today with verbal cues given for TA activation. Pt progressing well and motion improving with functional mobility. · Pain: Post Session report:  Pain: 2/10      GOOD ·   Compliance with Program/Exercises: Compliant  · Recommendations/Intent for next treatment session: \"Next visit will focus on advancements to more challenging activities and reduction in assistance provided\".     Future Appointments  Date Time Provider Tay Aguayo   9/24/2018 9:30 AM Cari Crook [facility]   9/26/2018 9:30 AM Mary Zamorano [facility]   9/28/2018 9:30 AM Irais Paulson [facility]       Total Treatment Duration: 40 mins  PT Patient Time In/Time Out  Time In: 0935  Time Out: [address]

## 2018-09-21 ENCOUNTER — APPOINTMENT (OUTPATIENT)
Dept: PHYSICAL THERAPY | Age: 48
End: 2018-09-21
Payer: COMMERCIAL

## 2018-09-24 ENCOUNTER — HOSPITAL ENCOUNTER (OUTPATIENT)
Dept: PHYSICAL THERAPY | Age: 48
Discharge: HOME | End: 2018-09-24
Payer: COMMERCIAL

## 2018-09-24 NOTE — PROGRESS NOTES
Katrina Felton  : 1970  Primary: Leroy Cadet Rpn  Secondary:  [facility] at [facility] [address]  [address]. [facility], [address]  [phone]   [phone]        OUTPATIENT Cancel NOTE    NAME/AGE/GENDER: Katrina Felton is a 46 y.o. male. DATE: 2018    Patient canceled for appointment today due to being sick. Will plan to follow up on next scheduled visit.     Nazanin Rowan

## 2018-09-26 ENCOUNTER — HOSPITAL ENCOUNTER (OUTPATIENT)
Dept: PHYSICAL THERAPY | Age: 48
Discharge: HOME | End: 2018-09-26
Payer: COMMERCIAL

## 2018-09-26 PROCEDURE — 97530 THERAPEUTIC ACTIVITIES: CPT

## 2018-09-26 PROCEDURE — 97140 MANUAL THERAPY 1/> REGIONS: CPT

## 2018-09-26 PROCEDURE — 97110 THERAPEUTIC EXERCISES: CPT

## 2018-09-26 NOTE — PROGRESS NOTES
Roosevelt Vaughan  : 1970      Payor: Bibi Tian / Plan: [id number] RPN / Product Type: Commerical /  [address] [facility], [address], [facility], [address] [place] [address]  [phone]   [phone]        OUTPATIENT PHYSICAL THERAPY Progress Report, Daily Note, Visit # 9 :2018    ICD-10: Treatment Diagnosis:   Stiffness of left ankle, not elsewhere classified (M25.672)  Other abnormalities of gait and mobility (R26.89)  Pain in left ankle and joints of left foot (M25.572)  Effusion, left ankle (M25.472)  Precautions/Allergies:   Review of patient's allergies indicates no known allergies. Fall Risk Score: 2 (? 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 He has demonstrated slight increase in L ankle joint mobility, however it continues to be limited, as well as restrictions through achilles tendon. Gastroc/soleus demonstrate slight increase in strength but limitations in single leg stance due to decrease in ankle stability remain. Patient has returned to work as of 18 full time with restrictions (not specified by patient) as a . His job requires a lot of standing and lifting/pushing/pulling up to 30lbs.  Patient will continue to benefit from additonal skilled physical therapy 3x/wk x4wks for manual therapeutic techniques (as appropriate), therapeutic exercises and activities, neuromuscular re-education, balance training and comprehensive home exercises program to address current impairments and functional limitations to improve his functional strength with basic ADLs and work related functions. INITIAL ASSESSMENT:   Mr. Omar Cummings presents to physical therapy s/p Left ankle exostectomy, Left triple arthrodesis, subtalar joint malunion osteotomy, and Left Achilles lengthening  on 7/2/18. He demonstrates L ankle/foot effusion, stiffness, antalgic gait, decreased strength, ROM, joint mobility, and functional mobility limiting his ability to complete basic ADLs. These S/S are consistent with L ankle stiffness, effusion, and pain, and antalgic gait. Patient will benefit from skilled physical therapy for manual therapeutic techniques (as appropriate), therapeutic exercises and activities, neuromuscular re-education, balance training and comprehensive home exercises program to address current impairments and functional limitations. Elliott Rodríguez will benefit from skilled PT (medically necessary) in order to address above deficits affecting participation in basic ADLs and overall functional tolerance. PROBLEM LIST (Impacting functional limitations):   1. Decreased Strength  2. Decreased Joint mobility/ROM  3. Decreased ADL/Functional Activities  4. Decreased Transfer Abilities  5. Decreased Ambulation Ability/Technique  6. Decreased Balance  7. Increased Pain  8. Decreased Activity Tolerance  9. Decreased [place] with Home Exercise Program INTERVENTIONS PLANNED:   1. Balance Exercise  2. Bed Mobility  3. Cold  4. Family Education  5. Gait Training  6. Heat  7. Home Exercise Program (HEP)  8. Manual Therapy  9. Neuromuscular Re-education/Strengthening  10. Range of Motion (ROM)  11. Therapeutic Activites  12. Therapeutic Exercise/Strengthening  13. Transfer Training  14. Ultrasound (US)   TREATMENT PLAN:  Effective Dates: 8/30/18 TO 10/29/2018 (60 days).   Frequency/Duration: 3 times a week for 60 Days  SHORT-TERM FUNCTIONAL GOALS: Time Frame: 4 weeks  Elliott Rodríguez will be compliant with home exercise program within 4 weeks in order to improve active participation with management of patient's symptoms and/or functional deficits. Mk Mckeon will report <=3/10 pain with walking >15 minutes in order to participate in daily exercise and daily activities (met)  Mk Mckeon will be able to stand >60 minutes with <2/10 pain to feet in order to participate in household duties without issues/compromise. Mk Mckeon will report being able to sleep at night without waking up secondary to foot/ankle pain. (met)  Mk Mckeon  will improve ankle PF/DF active ROM from 32/0 to [place]  will improve ankle strength from 4/5 to 4+/5 to improve tolerance of ADLs. (met)  Mk Mckeon will be able to ascend/descend >8 steps with reciprocal pattern to demonstrate improvement in functional mobility at home. DISCHARGE GOALS: Time Frame: 8 weeks  Mk Mckeon will be independent with home exercise program within 8 weeks in order to improve independence with management of patient's symptoms and/or functional deficits. Mk Mckeon will report no pain with walking affecting participation in activities of daily living. Mk Mckeon will be able to stand >60 minutes without onset of foot pain. Mk Mckeon will be report improved score on FAAM from 34/84 to >60/84 to improve functional independence. HISTORY:   History of Present Injury/Illness (Reason for Referral): Mk Mckeon presents to physical therapy s/p L ankle/foot reconstruction approximately 8 weeks ago. He states he broke his ankle 20 years ago but never sought treatment for it. He states it healed \"crooked\" so he has been walking on the side of his foot (medial) and after a while it began to really hurt him.  He states last year he sought care for it and was placed in a boot/cast for a while to re-align his foot, however that failed so he had it surgically reconstructed and referred to PT.     -Present symptoms/complaints (on day of evaluation)   Pain Scale:  · Current: 5/10  · Best: 1/10  · Worst: 6/10    · Aggravating factors: walking long distances, going up/down steps, squatting, raising up on toes,    · Relieving factors: rest      Past Medical History/Comorbidities:   Mr. Miryam Starkey  has a past medical history of Hypertension. Mr. Miryam Starkey  has a past surgical history that includes hx hernia repair (1998) and hx colonoscopy., L ankle reconstruction (2018)    Active Ambulatory Problems     Diagnosis Date Noted    No Active Ambulatory Problems     Resolved Ambulatory Problems     Diagnosis Date Noted    No Resolved Ambulatory Problems     Past Medical History:   Diagnosis Date    Hypertension      Social History/Living Environment:        Social History     Social History    Marital status:      Spouse name: N/A    Number of children: N/A    Years of education: N/A     Occupational History    Not on file. Social History Main Topics    Smoking status: Never Smoker    Smokeless tobacco: Never Used    Alcohol use No    Drug use: No    Sexual activity: Not on file     Other Topics Concern    Not on file     Social History Narrative     Prior Level of Function/Work/Activity:  Not working  Hobbies: walking/travel  Dominant Side:         RIGHT  Other Clinical Tests  X-ray: healing good, per pt. Previous Treatment Approach   Corrective brace and surgery  Current Medications:    Current Outpatient Prescriptions:     losartan-hydroCHLOROthiazide (HYZAAR) 100-12.5 mg per tablet, Take 1 Tab by mouth daily. Indications: hypertension, Disp: , Rfl:      Date Last Reviewed:  9/26/2018   EXAMINATION:   Observation/Orthostatic Postural Assessment:  Assessed @ Initial Visit:      Gait Antalgic with improved toe-off in terminal stance on L, foot flat, and L hip ER    Standing; primarily WB through R LE and L in ER    Palpation:  Assessed @ Initial Visit: mild Tenderness to palpation along achilles T with restrictions noted.  Tenderness and restriction through plantar fascia and talocrual joint. Surgical scars present good healing, however continued restricted in mobility/pliability                     AROM/PROM         Joint: Eval Date: 8/30/18  Re-Assess Date: 9/26/18  Re-Assess Date:    Active ROM RIGHT LEFT RIGHT LEFT RIGHT LEFT   Ankle Dorsiflexion 5 0  0     Ankle Plantarflexion 45 32  32     Ankle Inversion 6 0  1     Ankle Eversion 4 8  10                       Passive ROM         Ankle Dorsiflexion  1  4     Ankle Plantarflexion  32  32     Ankle Inversion  0  2     Ankle Eversion  8  10       Strength:     Eval Date: 8/30/18  Re-Assess Date: 9/26/18  Re-Assess Date:      RIGHT LEFT RIGHT LEFT RIGHT LEFT   Ankle Dorsiflexion 5/5 4/5  5/5     Ankle Plantarflexion 5/5 4+/5  4+/5     Ankle Inversion 5/5 4/5  4+/5     Ankle Eversion 5/5 4/5  4+/5     Knee Flexion 5/5 5/5       Knee Extension 5/5 5/5                  Joint Mobility Eval Date: 8/30/18  Re-Assess Date:  Re-Assess Date:     RIGHT LEFT RIGHT LEFT RIGHT LEFT   Subtalar  hypomobile  Hypomobile      Talocrual  hypomobile   hypomobile       Special Tests: Assessed @ Initial Visit:     Neurological Screen: Assessed @ Initial Visit No radiating symptoms down leg    Functional Mobility:  Assessed @ Initial Visit Limited tolerance of walking and standing  Heel/Toe walking= unable  Calf Raise (Dbl/Single)= Dbl unable  Balance:  Assessed @ Initial Visit   Single leg stance: R= 1-5seconds  L= 0 seconds   Body Structures Involved:  1. Bones  2. Joints  3. Muscles  4. Ligaments Body Functions Affected:  1. Sensory/Pain  2. Neuromusculoskeletal  3. Movement Related Activities and Participation Affected:  1. Mobility  2. Self Care   CLINICAL DECISION MAKING:   Tool Used: FOOT AND ANKLE ABILITY MEASURE (FAAM)  Score:  Initial: 34/84 Most Recent: X (Date: -- )   Interpretation of Score:  For the \"Activities of Daily Living\", there are 21 questions each scored on a 5 point scale with 0 representing \"Unable to do\" and 4 representing \"No difficulty\". The lower the score, the greater the functional disability. 84/84 represents no disability. Minimal detectable change is 5.7 points. With the addition of the 8 questions in the \"Sports Subscale,\" there are 29 questions, each scored on a 5 point scale with 0 representing \"Unable to do\" and 4 representing \"No difficulty\". The lower the score, the greater the functional disability. 116/116 represents no disability. Minimal detectable change is 12.3 points. Activities of Daily Living:  Score 84 83-68 67-51 50-34 33-18 17-1 0   Modifier CH CI CJ CK CL CM CN     Activities of Daily Living + Sports Subscale:  Score 116 115-94 93-71 70-47 46-24 23-1 0   Modifier CH CI CJ CK CL CM CN     ? Mobility - Walking and Moving Around:     - CURRENT STATUS: CK - 40%-59% impaired, limited or restricted    - GOAL STATUS: CJ - 20%-39% impaired, limited or restricted    - D/C STATUS:  ---------------To be determined---------------      Medical Necessity:   · Patient is expected to demonstrate progress in strength, range of motion, balance, coordination and functional technique to increase independence with functional mobility at home to perform household duties and improve safety during ascending/descending stairs, walking on uneven ground, and squatting. .  Reason for Services/Other Comments:  · Patient requires manual therapeutic interventions as well as thereapeutic exercises/activities and gait training to improve patients saftey with ambulation in the community and functional mobility in and out of home. .   TREATMENT:   (In addition to Assessment/Re-Assessment sessions the following treatments were rendered)      · Pre-Treatment Pain/ Symptoms: Doing great with 2/10 pain. THERAPEUTIC EXERCISE: (10 minutes):  Exercises per grid below to improve mobility, strength and balance.   Required minimal visual and verbal cues to promote proper body alignment, promote proper body posture and promote proper body mechanics. Progressed resistance, range, repetitions and complexity of movement as indicated. Date:  8/30/18 Date:  9/5/18 Date:  9/7/18 Date  9/10/18 Date  9/12/18 Date   9/14/18 Date:  9/17/18 Date  9/19/18 Date  9/26/18   Exercise Parameters Parameters Parameters         Education POC, PT goals, HEP, ICE        PRICE, HEP, re-assessment   Plantar fascia stretch 3x30\"  3x30\"         Calf stretch  Standing 5x10\"  3x30\" and HS stretch 3x30\" 3x30\" 3x30\" on slant board 3x30\" on slant board 3x30\" slant board 4x30\" with plantar fascia stretch   Towel curls 30x 30x 30x         Seated calf raises 30x   10x        Wobble board  Seated 30x fwd/backward  30x side/side Seated 30x fwd/backward  30x side/side Seated 30x fwd/backward  30x side/side Standing 4 mins for balance fwd/backward Standing 2 mins for balance fwd/backward Standing 2 mins for balance fwd/backward  Med/lateral   Standing 2 mins for balance fwd/backward  Med/lateral    Plantar flex  Green band 30x Green band 30x         Bike  8 mins level4 10 mins level 4 10 mins  Level 7 5 minutes Level 7 5 minutes Level 6 8 min Level 7 8 mins   RDL Single leg      Picking up cone 13x Picking up cone 13x Picking up cone 13x                   [facility] Portal    Therapeutic Activity: (10 minutes): Activity Date:  9/5/18 Date:  9/7/18 Date:  9/10/18 Date  9/12/18 Date:  9/14/18 Date  9/17/18 Date  9/19/18 Date  9/26/18   Exercise Parameters Parameters Parameters        STS No UE support 2x10;equal WB  20x L foot back for increase WB         Step overs Heel/toe pattern 20x each   Cone, step-to pattern; 10ft 8x Cone, step-to pattern; 10ft 8x    Side to side lateral 8x. Cone, step-to pattern; 10ft 8x    Side to side lateral 8x. Cone, step-to pattern with SL 3\" balance; 10ft 8x    Side to side lateral 8x.   Cone, step-to pattern with SL 3\" balance; 10ft 8x   Cone taps  2x10 for SL balance 20x for SL balance 20x for SL balance 20x slow  25 x     Seated heel raises  30x 20x     15x10\"   Tight rope walking   20ft 4x 30ft 4x Heel-toe 30' Heel toe 30' Heel-toe 40ft    Lunge on step   10x10\" for tibial translation 10x10\" for tibial translation       Gait training   50ft for heel/toe and hip/knee flex        Heel/toe walking    15ft       SL Balance rolling ball with R LE     Forward and back 10x (CGA by PT) Forward and back 10x (CGA by PT) Forward and back 10x (SBA by PT)    SLS      NV 30x3  10x5\"   Step ups       4 inch L SL       Neuromuscular Education:      Manual Therapy Interventions: (15 minutes): Manual interventions per grid below performed to improve joint/soft tissue mobility and ROM to improve ability to perform ADLs. Patient responded well to all manual interventions with no significant increase in pain/symptoms. Improved ankle/foot ROM demonstrated following interventions and decrease in pain scale listed below. Manual Intervention Date:  8/30/18 Date:  9/5/18 Date:  9/7/18 Date  9/10/18 Date  9/12/18 9/14/18 9.17.18 9/26/18   Soft tissue mobilization Joint Mobility Parameters Parameters Parameters         Talocrual A/P Grade III A/P Grade III A/P grade III A/P grade III A/P grade III A/P grade III med/lat grade III  A/P grade III med/lat grade III  A/P grade IV med/lat grade III     Subtalar Med/lat grade III Med/lat Grade III-IV Med/lat grade III-IV Med/lst grade III-IV Med/lat grade III   Med/lat grade IV   Plantar fascia  Release  release release release      Gastroc/soleus   Strumming/release Strumming/release strumming                                              MODALITIES: (15 minutes):      vasopuematic compression for decrease inflammation/swelling and pain to improve overall ankle mobility and standing tolerance. Location= L ankle  Skin intact pre and post treatment with no adverse symptoms    Treatment/Session Assessment: See above re-assessment reported above for full details on re-assessment performed today.  Patient continues to demonstrate difficulty with balance SL on L LE and reports lateral pain. Increase in swelling noted around talocrual joint which was significantly decreased post vaso pneumatic  compression today as well as, the pt was able to demonstrate an increase in L LE stance time after the modality. · Pain: Post Session report:  Pain: 1/10      GOOD ·   Compliance with Program/Exercises: Compliant  · Recommendations/Intent for next treatment session: \"Next visit will focus on advancements to more challenging activities and reduction in assistance provided\". Continue with vaso pneumatic compression for decrease inflammation and ankle mobility.      Future Appointments  Date Time Provider Tay Aguayo   9/28/2018 9:30 AM Marina Guerrero [facility]       Total Treatment Duration: 50 mins  PT Patient Time In/Time Out  Time In: 0940  Time Out: [address]

## 2018-09-28 ENCOUNTER — HOSPITAL ENCOUNTER (OUTPATIENT)
Dept: PHYSICAL THERAPY | Age: 48
Discharge: HOME | End: 2018-09-28
Payer: COMMERCIAL

## 2018-09-28 PROCEDURE — 97110 THERAPEUTIC EXERCISES: CPT

## 2018-09-28 PROCEDURE — 97140 MANUAL THERAPY 1/> REGIONS: CPT

## 2018-09-28 PROCEDURE — 97016 VASOPNEUMATIC DEVICE THERAPY: CPT

## 2018-09-28 PROCEDURE — 97530 THERAPEUTIC ACTIVITIES: CPT

## 2018-09-28 NOTE — PROGRESS NOTES
Maksim Allen  : 1970      Payor: Sona Cortez / Plan: Holden Ochoa RPN / Product Type: Commerical /  [address]. [facility], [address] [place] [address], [address] [place] [address]  [phone]   [phone]        OUTPATIENT PHYSICAL THERAPY  Daily Note, Visit # 10 :2018    ICD-10: Treatment Diagnosis:   Stiffness of left ankle, not elsewhere classified (M25.672)  Other abnormalities of gait and mobility (R26.89)  Pain in left ankle and joints of left foot (M25.572)  Effusion, left ankle (M25.472)  Precautions/Allergies:   Review of patient's allergies indicates no known allergies. Fall Risk Score: 2 (? 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 He has demonstrated slight increase in L ankle joint mobility, however it continues to be limited, as well as restrictions through achilles tendon. Gastroc/soleus demonstrate slight increase in strength but limitations in single leg stance due to decrease in ankle stability remain. Patient has returned to work as of 18 full time with restrictions (not specified by patient) as a . His job requires a lot of standing and lifting/pushing/pulling up to 30lbs.  Patient will continue to benefit from additonal skilled physical therapy 3x/wk x4wks for manual therapeutic techniques (as appropriate), therapeutic exercises and activities, neuromuscular re-education, balance training and comprehensive home exercises program to address current impairments and functional limitations to improve his functional strength with basic ADLs and work related functions. INITIAL ASSESSMENT:   Mr. Kassandra Pro presents to physical therapy s/p Left ankle exostectomy, Left triple arthrodesis, subtalar joint malunion osteotomy, and Left Achilles lengthening  on 7/2/18. He demonstrates L ankle/foot effusion, stiffness, antalgic gait, decreased strength, ROM, joint mobility, and functional mobility limiting his ability to complete basic ADLs. These S/S are consistent with L ankle stiffness, effusion, and pain, and antalgic gait. Patient will benefit from skilled physical therapy for manual therapeutic techniques (as appropriate), therapeutic exercises and activities, neuromuscular re-education, balance training and comprehensive home exercises program to address current impairments and functional limitations. Maria Luz Mosley will benefit from skilled PT (medically necessary) in order to address above deficits affecting participation in basic ADLs and overall functional tolerance. PROBLEM LIST (Impacting functional limitations):   1. Decreased Strength  2. Decreased Joint mobility/ROM  3. Decreased ADL/Functional Activities  4. Decreased Transfer Abilities  5. Decreased Ambulation Ability/Technique  6. Decreased Balance  7. Increased Pain  8. Decreased Activity Tolerance  9. Decreased [place] with Home Exercise Program INTERVENTIONS PLANNED:   1. Balance Exercise  2. Bed Mobility  3. Cold  4. Family Education  5. Gait Training  6. Heat  7. Home Exercise Program (HEP)  8. Manual Therapy  9. Neuromuscular Re-education/Strengthening  10. Range of Motion (ROM)  11. Therapeutic Activites  12. Therapeutic Exercise/Strengthening  13. Transfer Training  14. Ultrasound (US)   TREATMENT PLAN:  Effective Dates: 8/30/18 TO 10/29/2018 (60 days).   Frequency/Duration: 3 times a week for 60 Days  SHORT-TERM FUNCTIONAL GOALS: Time Frame: 4 weeks  Maria Luz Mosley will be compliant with home exercise program within 4 weeks in order to improve active participation with management of patient's symptoms and/or functional deficits. Josiah Parmar will report <=3/10 pain with walking >15 minutes in order to participate in daily exercise and daily activities (met)  Josiah Parmar will be able to stand >60 minutes with <2/10 pain to feet in order to participate in household duties without issues/compromise. Josiah Parmar will report being able to sleep at night without waking up secondary to foot/ankle pain. (met)  Josiah Parmar  will improve ankle PF/DF active ROM from 32/0 to [place]  will improve ankle strength from 4/5 to 4+/5 to improve tolerance of ADLs. (met)  Josiah Parmar will be able to ascend/descend >8 steps with reciprocal pattern to demonstrate improvement in functional mobility at home. DISCHARGE GOALS: Time Frame: 8 weeks  Josiah Parmar will be independent with home exercise program within 8 weeks in order to improve independence with management of patient's symptoms and/or functional deficits. Josiah Parmar will report no pain with walking affecting participation in activities of daily living. Josiah Parmar will be able to stand >60 minutes without onset of foot pain. Josiah Parmar will be report improved score on FAAM from 34/84 to >60/84 to improve functional independence. HISTORY:   History of Present Injury/Illness (Reason for Referral): Josiah Parmar presents to physical therapy s/p L ankle/foot reconstruction approximately 8 weeks ago. He states he broke his ankle 20 years ago but never sought treatment for it. He states it healed \"crooked\" so he has been walking on the side of his foot (medial) and after a while it began to really hurt him.  He states last year he sought care for it and was placed in a boot/cast for a while to re-align his foot, however that failed so he had it surgically reconstructed and referred to PT.     -Present symptoms/complaints (on day of evaluation)   Pain Scale:  · Current: 5/10  · Best: 1/10  · Worst: 6/10    · Aggravating factors: walking long distances, going up/down steps, squatting, raising up on toes,    · Relieving factors: rest      Past Medical History/Comorbidities:   Mr. Saundra Birmingham  has a past medical history of Hypertension. Mr. Saundra Birmingham  has a past surgical history that includes hx hernia repair (1998) and hx colonoscopy., L ankle reconstruction (2018)    Active Ambulatory Problems     Diagnosis Date Noted    No Active Ambulatory Problems     Resolved Ambulatory Problems     Diagnosis Date Noted    No Resolved Ambulatory Problems     Past Medical History:   Diagnosis Date    Hypertension      Social History/Living Environment:        Social History     Social History    Marital status:      Spouse name: N/A    Number of children: N/A    Years of education: N/A     Occupational History    Not on file. Social History Main Topics    Smoking status: Never Smoker    Smokeless tobacco: Never Used    Alcohol use No    Drug use: No    Sexual activity: Not on file     Other Topics Concern    Not on file     Social History Narrative     Prior Level of Function/Work/Activity:  Not working  Hobbies: walking/travel  Dominant Side:         RIGHT  Other Clinical Tests  X-ray: healing good, per pt. Previous Treatment Approach   Corrective brace and surgery  Current Medications:    Current Outpatient Prescriptions:     losartan-hydroCHLOROthiazide (HYZAAR) 100-12.5 mg per tablet, Take 1 Tab by mouth daily. Indications: hypertension, Disp: , Rfl:      Date Last Reviewed:  9/28/2018   EXAMINATION:   Observation/Orthostatic Postural Assessment:  Assessed @ Initial Visit:      Gait Antalgic with improved toe-off in terminal stance on L, foot flat, and L hip ER    Standing; primarily WB through R LE and L in ER    Palpation:  Assessed @ Initial Visit: mild Tenderness to palpation along achilles T with restrictions noted.  Tenderness and restriction through plantar fascia and talocrual joint.  Surgical scars present good healing, however continued restricted in mobility/pliability                     AROM/PROM         Joint: Eval Date: 8/30/18  Re-Assess Date: 9/26/18  Re-Assess Date:    Active ROM RIGHT LEFT RIGHT LEFT RIGHT LEFT   Ankle Dorsiflexion 5 0  0     Ankle Plantarflexion 45 32  32     Ankle Inversion 6 0  1     Ankle Eversion 4 8  10                       Passive ROM         Ankle Dorsiflexion  1  4     Ankle Plantarflexion  32  32     Ankle Inversion  0  2     Ankle Eversion  8  10       Strength:     Eval Date: 8/30/18  Re-Assess Date: 9/26/18  Re-Assess Date:      RIGHT LEFT RIGHT LEFT RIGHT LEFT   Ankle Dorsiflexion 5/5 4/5  5/5     Ankle Plantarflexion 5/5 4+/5  4+/5     Ankle Inversion 5/5 4/5  4+/5     Ankle Eversion 5/5 4/5  4+/5     Knee Flexion 5/5 5/5       Knee Extension 5/5 5/5                  Joint Mobility Eval Date: 8/30/18  Re-Assess Date:  Re-Assess Date:     RIGHT LEFT RIGHT LEFT RIGHT LEFT   Subtalar  hypomobile  Hypomobile      Talocrual  hypomobile   hypomobile       Special Tests: Assessed @ Initial Visit:     Neurological Screen: Assessed @ Initial Visit No radiating symptoms down leg    Functional Mobility:  Assessed @ Initial Visit Limited tolerance of walking and standing  Heel/Toe walking= unable  Calf Raise (Dbl/Single)= Dbl unable  Balance:  Assessed @ Initial Visit   Single leg stance: R= 1-5seconds  L= 0 seconds   Body Structures Involved:  1. Bones  2. Joints  3. Muscles  4. Ligaments Body Functions Affected:  1. Sensory/Pain  2. Neuromusculoskeletal  3. Movement Related Activities and Participation Affected:  1. Mobility  2. Self Care   CLINICAL DECISION MAKING:   Tool Used: FOOT AND ANKLE ABILITY MEASURE (FAAM)  Score:  Initial: 34/84 Most Recent: X (Date: -- )   Interpretation of Score: For the \"Activities of Daily Living\", there are 21 questions each scored on a 5 point scale with 0 representing \"Unable to do\" and 4 representing \"No difficulty\". The lower the score, the greater the functional disability. 84/84 represents no disability. Minimal detectable change is 5.7 points. With the addition of the 8 questions in the \"Sports Subscale,\" there are 29 questions, each scored on a 5 point scale with 0 representing \"Unable to do\" and 4 representing \"No difficulty\". The lower the score, the greater the functional disability. 116/116 represents no disability. Minimal detectable change is 12.3 points. Activities of Daily Living:  Score 84 83-68 67-51 50-34 33-18 17-1 0   Modifier CH CI CJ CK CL CM CN     Activities of Daily Living + Sports Subscale:  Score 116 115-94 93-71 70-47 46-24 23-1 0   Modifier CH CI CJ CK CL CM CN     ? Mobility - Walking and Moving Around:     - CURRENT STATUS: CK - 40%-59% impaired, limited or restricted    - GOAL STATUS: CJ - 20%-39% impaired, limited or restricted    - D/C STATUS:  ---------------To be determined---------------      Medical Necessity:   · Patient is expected to demonstrate progress in strength, range of motion, balance, coordination and functional technique to increase independence with functional mobility at home to perform household duties and improve safety during ascending/descending stairs, walking on uneven ground, and squatting. .  Reason for Services/Other Comments:  · Patient requires manual therapeutic interventions as well as thereapeutic exercises/activities and gait training to improve patients saftey with ambulation in the community and functional mobility in and out of home. .   TREATMENT:   (In addition to Assessment/Re-Assessment sessions the following treatments were rendered)      · Pre-Treatment Pain/ Symptoms: Doing great with 1/10 pain. Feeling better as swelling goes down       THERAPEUTIC EXERCISE: (10 minutes):  Exercises per grid below to improve mobility, strength and balance.   Required minimal visual and verbal cues to promote proper body alignment, promote proper body posture and promote proper body mechanics. Progressed resistance, range, repetitions and complexity of movement as indicated. Date:  8/30/18 Date:  9/5/18 Date:  9/7/18 Date  9/10/18 Date  9/12/18 Date   9/14/18 Date:  9/17/18 Date  9/19/18 Date  9/26/18 Date  9/28/18   Exercise Parameters Parameters Parameters          Education POC, PT goals, HEP, ICE        PRICE, HEP, re-assessment    Plantar fascia stretch 3x30\"  3x30\"          Calf stretch  Standing 5x10\"  3x30\" and HS stretch 3x30\" 3x30\" 3x30\" on slant board 3x30\" on slant board 3x30\" slant board 4x30\" with plantar fascia stretch 4x30\" with plantar fascia stretch   Towel curls 30x 30x 30x          Seated calf raises 30x   10x         Wobble board  Seated 30x fwd/backward  30x side/side Seated 30x fwd/backward  30x side/side Seated 30x fwd/backward  30x side/side Standing 4 mins for balance fwd/backward Standing 2 mins for balance fwd/backward Standing 2 mins for balance fwd/backward  Med/lateral   Standing 2 mins for balance fwd/backward  Med/lateral     Plantar flex  Green band 30x Green band 30x          Bike  8 mins level4 10 mins level 4 10 mins  Level 7 5 minutes Level 7 5 minutes Level 6 8 min Level 7 8 mins Level 5 6 mins   RDL Single leg      Picking up cone 13x Picking up cone 13x Picking up cone 13x  Picking up cone 15x                   [facility] Portal    Therapeutic Activity: (15 minutes): Activity Date:  9/5/18 Date:  9/7/18 Date:  9/10/18 Date  9/12/18 Date:  9/14/18 Date  9/17/18 Date  9/19/18 Date  9/26/18 Date  9/28/18   Exercise Parameters Parameters Parameters         STS No UE support 2x10;equal WB  20x L foot back for increase WB          Step overs Heel/toe pattern 20x each   Cone, step-to pattern; 10ft 8x Cone, step-to pattern; 10ft 8x    Side to side lateral 8x. Cone, step-to pattern; 10ft 8x    Side to side lateral 8x. Cone, step-to pattern with SL 3\" balance; 10ft 8x    Side to side lateral 8x.   Cone, step-to pattern with SL 3\" balance; 10ft 8x    Cone taps  2x10 for SL balance 20x for SL balance 20x for SL balance 20x slow  25 x   Multi-direction 20x   Seated heel raises  30x 20x     15x10\"    Tight rope walking   20ft 4x 30ft 4x Heel-toe 30' Heel toe 30' Heel-toe 40ft     Lunge on step   10x10\" for tibial translation 10x10\" for tibial translation        Gait training   50ft for heel/toe and hip/knee flex         Heel/toe walking    15ft        SL Balance rolling ball with R LE     Forward and back 10x (CGA by PT) Forward and back 10x (CGA by PT) Forward and back 10x (SBA by PT) Forward and back 20x (SBA by PT) Forward and back 20x (SBA by PT)   SLS      NV 30x3  10x5\"    Step ups       4 inch L SL      Fwd Lunge         Onto airex 30x Onto airex 30x     Neuromuscular Education:      Manual Therapy Interventions: (15 minutes): Manual interventions per grid below performed to improve joint/soft tissue mobility and ROM to improve ability to perform ADLs. Patient responded well to all manual interventions with no significant increase in pain/symptoms. Improved ankle/foot ROM demonstrated following interventions and decrease in pain scale listed below.                Manual Intervention Date:  8/30/18 Date:  9/5/18 Date:  9/7/18 Date  9/10/18 Date  9/12/18 9/14/18 9.17.18 9/26/18 9.28.18   Soft tissue mobilization Joint Mobility Parameters Parameters Parameters          Talocrual A/P Grade III A/P Grade III A/P grade III A/P grade III A/P grade III A/P grade III med/lat grade III  A/P grade III med/lat grade III  A/P grade IV med/lat grade III  A/P grade IV med/lat grade III     Subtalar Med/lat grade III Med/lat Grade III-IV Med/lat grade III-IV Med/lst grade III-IV Med/lat grade III   Med/lat grade IV Med/lat grade IV   Plantar fascia  Release  release release release       Gastroc/soleus   Strumming/release Strumming/release strumming     Strumming/release                                             MODALITIES: (15 minutes): vasopuematic compression for decrease inflammation/swelling and pain to improve overall ankle mobility and standing tolerance. Location= L ankle  Skin intact pre and post treatment with no adverse symptoms  Decrease swelling noted afterward and pain 0/10    Treatment/Session Assessment: Pt responded well to continued ankle mobilization and soft tissue mobility of gastroc/soleus and achilles T for maintaining and improving mobility. Pt demonstrated increase stance time on L LE with single leg stance exercises/activities today with no increase in pain reported. · Pain: Post Session report:  Pain: 0/10      GOOD ·   Compliance with Program/Exercises: Compliant  · Recommendations/Intent for next treatment session: \"Next visit will focus on advancements to more challenging activities and reduction in assistance provided\". Continue with vaso pneumatic compression for decrease inflammation and ankle mobility.      Future Appointments  Date Time Provider Tay Aguayo   10/3/2018 9:00 AM [facility]   10/5/2018 8:15 AM Irais Parsons [facility]       Total Treatment Duration: 55 mins  PT Patient Time In/Time Out  Time In: 0945  Time Out: Valeri Beyer

## 2018-10-03 ENCOUNTER — HOSPITAL ENCOUNTER (OUTPATIENT)
Dept: PHYSICAL THERAPY | Age: 48
Discharge: HOME | End: 2018-10-03
Payer: COMMERCIAL

## 2018-10-03 PROCEDURE — 97110 THERAPEUTIC EXERCISES: CPT

## 2018-10-03 PROCEDURE — 97140 MANUAL THERAPY 1/> REGIONS: CPT

## 2018-10-03 NOTE — PROGRESS NOTES
Aparna Peralta  : 1970      Payor: Mirta Chance / Plan: [id number] [facility] RPN / Product Type: Commerical /  [address] at [address] [place]. [facility], [address] [place] [address], [address] [place] [address]  [phone]   [phone]        OUTPATIENT PHYSICAL THERAPY  Daily Note, Visit # 11 :10/3/2018    ICD-10: Treatment Diagnosis:   Stiffness of left ankle, not elsewhere classified (M25.672)  Other abnormalities of gait and mobility (R26.89)  Pain in left ankle and joints of left foot (M25.572)  Effusion, left ankle (M25.472)  Precautions/Allergies:   Review of patient's allergies indicates no known allergies. Fall Risk Score: 2 (? 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 He has demonstrated slight increase in L ankle joint mobility, however it continues to be limited, as well as restrictions through achilles tendon. Gastroc/soleus demonstrate slight increase in strength but limitations in single leg stance due to decrease in ankle stability remain. Patient has returned to work as of 18 full time with restrictions (not specified by patient) as a . His job requires a lot of standing and lifting/pushing/pulling up to 30lbs.  Patient will continue to benefit from additonal skilled physical therapy 3x/wk x4wks for manual therapeutic techniques (as appropriate), therapeutic exercises and activities, neuromuscular re-education, balance training and comprehensive home exercises program to address current impairments and functional limitations to improve his functional strength with basic ADLs and work related functions. INITIAL ASSESSMENT:   Mr. Meg Boss presents to physical therapy s/p Left ankle exostectomy, Left triple arthrodesis, subtalar joint malunion osteotomy, and Left Achilles lengthening  on 7/2/18. He demonstrates L ankle/foot effusion, stiffness, antalgic gait, decreased strength, ROM, joint mobility, and functional mobility limiting his ability to complete basic ADLs. These S/S are consistent with L ankle stiffness, effusion, and pain, and antalgic gait. Patient will benefit from skilled physical therapy for manual therapeutic techniques (as appropriate), therapeutic exercises and activities, neuromuscular re-education, balance training and comprehensive home exercises program to address current impairments and functional limitations. Tamiko Mas will benefit from skilled PT (medically necessary) in order to address above deficits affecting participation in basic ADLs and overall functional tolerance. PROBLEM LIST (Impacting functional limitations):   1. Decreased Strength  2. Decreased Joint mobility/ROM  3. Decreased ADL/Functional Activities  4. Decreased Transfer Abilities  5. Decreased Ambulation Ability/Technique  6. Decreased Balance  7. Increased Pain  8. Decreased Activity Tolerance  9. Decreased Edwards with Home Exercise Program INTERVENTIONS PLANNED:   1. Balance Exercise  2. Bed Mobility  3. Cold  4. Family Education  5. Gait Training  6. Heat  7. Home Exercise Program (HEP)  8. Manual Therapy  9. Neuromuscular Re-education/Strengthening  10. Range of Motion (ROM)  11. Therapeutic Activites  12. Therapeutic Exercise/Strengthening  13. Transfer Training  14. Ultrasound (US)   TREATMENT PLAN:  Effective Dates: 8/30/18 TO 10/29/2018 (60 days).   Frequency/Duration: 3 times a week for 60 Days  SHORT-TERM FUNCTIONAL GOALS: Time Frame: 4 weeks  Tamiko Mas will be compliant with home exercise program within 4 weeks in order to improve active participation with management of patient's symptoms and/or functional deficits. Francois Santiago will report <=3/10 pain with walking >15 minutes in order to participate in daily exercise and daily activities (met)  Francois Santiago will be able to stand >60 minutes with <2/10 pain to feet in order to participate in household duties without issues/compromise. Francois Santiago will report being able to sleep at night without waking up secondary to foot/ankle pain. (met)  Francois Santiago  will improve ankle PF/DF active ROM from 32/0 to [place]  will improve ankle strength from 4/5 to 4+/5 to improve tolerance of ADLs. (met)  Francois Santiago will be able to ascend/descend >8 steps with reciprocal pattern to demonstrate improvement in functional mobility at home. DISCHARGE GOALS: Time Frame: 8 weeks  Francois Santiago will be independent with home exercise program within 8 weeks in order to improve independence with management of patient's symptoms and/or functional deficits. Francois Santiago will report no pain with walking affecting participation in activities of daily living. Francois Santiago will be able to stand >60 minutes without onset of foot pain. Francois Santiago will be report improved score on FAAM from 34/84 to >60/84 to improve functional independence. HISTORY:   History of Present Injury/Illness (Reason for Referral): Francois Santiago presents to physical therapy s/p L ankle/foot reconstruction approximately 8 weeks ago. He states he broke his ankle 20 years ago but never sought treatment for it. He states it healed \"crooked\" so he has been walking on the side of his foot (medial) and after a while it began to really hurt him.  He states last year he sought care for it and was placed in a boot/cast for a while to re-align his foot, however that failed so he had it surgically reconstructed and referred to PT.     -Present symptoms/complaints (on day of evaluation)   Pain Scale:  · Current: 5/10  · Best: 1/10  · Worst: 6/10    · Aggravating factors: walking long distances, going up/down steps, squatting, raising up on toes,    · Relieving factors: rest      Past Medical History/Comorbidities:   Mr. Jose Roth  has a past medical history of Hypertension. Mr. Jose Roth  has a past surgical history that includes hx hernia repair (1998) and hx colonoscopy., L ankle reconstruction (2018)    Active Ambulatory Problems     Diagnosis Date Noted    No Active Ambulatory Problems     Resolved Ambulatory Problems     Diagnosis Date Noted    No Resolved Ambulatory Problems     Past Medical History:   Diagnosis Date    Hypertension      Social History/Living Environment:        Social History     Social History    Marital status:      Spouse name: N/A    Number of children: N/A    Years of education: N/A     Occupational History    Not on file. Social History Main Topics    Smoking status: Never Smoker    Smokeless tobacco: Never Used    Alcohol use No    Drug use: No    Sexual activity: Not on file     Other Topics Concern    Not on file     Social History Narrative     Prior Level of Function/Work/Activity:  Not working  Hobbies: walking/travel  Dominant Side:         RIGHT  Other Clinical Tests  X-ray: healing good, per pt. Previous Treatment Approach   Corrective brace and surgery  Current Medications:    Current Outpatient Prescriptions:     losartan-hydroCHLOROthiazide (HYZAAR) 100-12.5 mg per tablet, Take 1 Tab by mouth daily. Indications: hypertension, Disp: , Rfl:      Date Last Reviewed:  10/3/2018   EXAMINATION:   Observation/Orthostatic Postural Assessment:  Assessed @ Initial Visit:      Gait Antalgic with improved toe-off in terminal stance on L, foot flat, and L hip ER    Standing; primarily WB through R LE and L in ER    Palpation:  Assessed @ Initial Visit: mild Tenderness to palpation along achilles T with restrictions noted.  Tenderness and restriction through plantar fascia and talocrual joint.  Surgical scars present good healing, however continued restricted in mobility/pliability                     AROM/PROM         Joint: Eval Date: 8/30/18  Re-Assess Date: 9/26/18  Re-Assess Date:    Active ROM RIGHT LEFT RIGHT LEFT RIGHT LEFT   Ankle Dorsiflexion 5 0  0     Ankle Plantarflexion 45 32  32     Ankle Inversion 6 0  1     Ankle Eversion 4 8  10                       Passive ROM         Ankle Dorsiflexion  1  4     Ankle Plantarflexion  32  32     Ankle Inversion  0  2     Ankle Eversion  8  10       Strength:     Eval Date: 8/30/18  Re-Assess Date: 9/26/18  Re-Assess Date:      RIGHT LEFT RIGHT LEFT RIGHT LEFT   Ankle Dorsiflexion 5/5 4/5  5/5     Ankle Plantarflexion 5/5 4+/5  4+/5     Ankle Inversion 5/5 4/5  4+/5     Ankle Eversion 5/5 4/5  4+/5     Knee Flexion 5/5 5/5       Knee Extension 5/5 5/5                  Joint Mobility Eval Date: 8/30/18  Re-Assess Date:  Re-Assess Date:     RIGHT LEFT RIGHT LEFT RIGHT LEFT   Subtalar  hypomobile  Hypomobile      Talocrual  hypomobile   hypomobile       Special Tests: Assessed @ Initial Visit:     Neurological Screen: Assessed @ Initial Visit No radiating symptoms down leg    Functional Mobility:  Assessed @ Initial Visit Limited tolerance of walking and standing  Heel/Toe walking= unable  Calf Raise (Dbl/Single)= Dbl unable  Balance:  Assessed @ Initial Visit   Single leg stance: R= 1-5seconds  L= 0 seconds   Body Structures Involved:  1. Bones  2. Joints  3. Muscles  4. Ligaments Body Functions Affected:  1. Sensory/Pain  2. Neuromusculoskeletal  3. Movement Related Activities and Participation Affected:  1. Mobility  2. Self Care   CLINICAL DECISION MAKING:   Tool Used: FOOT AND ANKLE ABILITY MEASURE (FAAM)  Score:  Initial: 34/84 Most Recent: X (Date: -- )   Interpretation of Score: For the \"Activities of Daily Living\", there are 21 questions each scored on a 5 point scale with 0 representing \"Unable to do\" and 4 representing \"No difficulty\". The lower the score, the greater the functional disability. 84/84 represents no disability. Minimal detectable change is 5.7 points. With the addition of the 8 questions in the \"Sports Subscale,\" there are 29 questions, each scored on a 5 point scale with 0 representing \"Unable to do\" and 4 representing \"No difficulty\". The lower the score, the greater the functional disability. 116/116 represents no disability. Minimal detectable change is 12.3 points. Activities of Daily Living:  Score 84 83-68 67-51 50-34 33-18 17-1 0   Modifier CH CI CJ CK CL CM CN     Activities of Daily Living + Sports Subscale:  Score 116 115-94 93-71 70-47 46-24 23-1 0   Modifier CH CI CJ CK CL CM CN     ? Mobility - Walking and Moving Around:     - CURRENT STATUS: CK - 40%-59% impaired, limited or restricted    - GOAL STATUS: CJ - 20%-39% impaired, limited or restricted    - D/C STATUS:  ---------------To be determined---------------      Medical Necessity:   · Patient is expected to demonstrate progress in strength, range of motion, balance, coordination and functional technique to increase independence with functional mobility at home to perform household duties and improve safety during ascending/descending stairs, walking on uneven ground, and squatting. .  Reason for Services/Other Comments:  · Patient requires manual therapeutic interventions as well as thereapeutic exercises/activities and gait training to improve patients saftey with ambulation in the community and functional mobility in and out of home. .   TREATMENT:   (In addition to Assessment/Re-Assessment sessions the following treatments were rendered)      · Pre-Treatment Pain/ Symptoms: Doing great and did not remember his appointment time was 20 mins earlier  0/10       THERAPEUTIC EXERCISE: (15 minutes):  Exercises per grid below to improve mobility, strength and balance.   Required minimal visual and verbal cues to promote proper body alignment, promote proper body posture and promote proper body mechanics. Progressed resistance, range, repetitions and complexity of movement as indicated. Date:  8/30/18 Date:  9/5/18 Date:  9/7/18 Date  9/10/18 Date  9/12/18 Date   9/14/18 Date:  9/17/18 Date  9/19/18 Date  9/26/18 Date  9/28/18 Date  8. 3.18   Exercise Parameters Parameters Parameters           Education POC, PT goals, HEP, ICE        PRICE, HEP, re-assessment     Plantar fascia stretch 3x30\"  3x30\"           Calf stretch  Standing 5x10\"  3x30\" and HS stretch 3x30\" 3x30\" 3x30\" on slant board 3x30\" on slant board 3x30\" slant board 4x30\" with plantar fascia stretch 4x30\" with plantar fascia stretch    Towel curls 30x 30x 30x           Seated calf raises 30x   10x          Wobble board  Seated 30x fwd/backward  30x side/side Seated 30x fwd/backward  30x side/side Seated 30x fwd/backward  30x side/side Standing 4 mins for balance fwd/backward Standing 2 mins for balance fwd/backward Standing 2 mins for balance fwd/backward  Med/lateral   Standing 2 mins for balance fwd/backward  Med/lateral   Standing 2x10 minimal UE support required today   Plantar flex  Green band 30x Green band 30x           Bike  8 mins level4 10 mins level 4 10 mins  Level 7 5 minutes Level 7 5 minutes Level 6 8 min Level 7 8 mins Level 5 6 mins    RDL Single leg      Picking up cone 13x Picking up cone 13x Picking up cone 13x  Picking up cone 15x    Wobble board           4 mins for ankle AROM in CKC, and 3 mins for balance fwd/bcwd      [facility] Portal    Therapeutic Activity: (0 minutes): Activity Date:  9/5/18 Date:  9/7/18 Date:  9/10/18 Date  9/12/18 Date:  9/14/18 Date  9/17/18 Date  9/19/18 Date  9/26/18 Date  9/28/18   Exercise Parameters Parameters Parameters         STS No UE support 2x10;equal WB  20x L foot back for increase WB          Step overs Heel/toe pattern 20x each   Cone, step-to pattern; 10ft 8x Cone, step-to pattern; 10ft 8x    Side to side lateral 8x. Cone, step-to pattern; 10ft 8x    Side to side lateral 8x. Cone, step-to pattern with SL 3\" balance; 10ft 8x    Side to side lateral 8x. Cone, step-to pattern with SL 3\" balance; 10ft 8x    Cone taps  2x10 for SL balance 20x for SL balance 20x for SL balance 20x slow  25 x   Multi-direction 20x   Seated heel raises  30x 20x     15x10\"    Tight rope walking   20ft 4x 30ft 4x Heel-toe 30' Heel toe 30' Heel-toe 40ft     Lunge on step   10x10\" for tibial translation 10x10\" for tibial translation        Gait training   50ft for heel/toe and hip/knee flex         Heel/toe walking    15ft        SL Balance rolling ball with R LE     Forward and back 10x (CGA by PT) Forward and back 10x (CGA by PT) Forward and back 10x (SBA by PT) Forward and back 20x (SBA by PT) Forward and back 20x (SBA by PT)   SLS      NV 30x3  10x5\"    Step ups       4 inch L SL      Fwd Lunge         Onto airex 30x Onto airex 30x     Neuromuscular Education:      Manual Therapy Interventions: (15 minutes): Manual interventions per grid below performed to improve joint/soft tissue mobility and ROM to improve ability to perform ADLs. Patient responded well to all manual interventions with no significant increase in pain/symptoms. Improved ankle/foot ROM demonstrated following interventions and decrease in pain scale listed below.                Manual Intervention Date:  8/30/18 Date:  9/5/18 Date:  9/7/18 Date  9/10/18 Date  9/12/18 9/14/18 9.17.18 9/26/18 9.28.18 10.3.18   Soft tissue mobilization Joint Mobility Parameters Parameters Parameters           Talocrual A/P Grade III A/P Grade III A/P grade III A/P grade III A/P grade III A/P grade III med/lat grade III  A/P grade III med/lat grade III  A/P grade IV med/lat grade III  A/P grade IV med/lat grade III  A/P grade IV med/lat grade III     Subtalar Med/lat grade III Med/lat Grade III-IV Med/lat grade III-IV Med/lst grade III-IV Med/lat grade III   Med/lat grade IV Med/lat grade IV Med/lat grade IV   Plantar fascia  Release  release release release        Gastroc/soleus   Strumming/release Strumming/release strumming     Strumming/release Strumming/release                                                MODALITIES: (0 minutes):      vasopuematic compression for decrease inflammation/swelling and pain to improve overall ankle mobility and standing tolerance. Location= L ankle  Skin intact pre and post treatment with no adverse symptoms  Decrease swelling noted afterward and pain 0/10    Treatment/Session Assessment: Pt responded well to continued ankle mobilization and soft tissue mobility of gastroc/soleus and achilles T again today. Improved mobility noted. Good tolerance to AROM standing on wobble board for DF/PF mobility and balance for ankle stability strengthening. Calf raises improving. · Pain: Post Session report:  Pain: 0/10      GOOD ·   Compliance with Program/Exercises: Compliant  · Recommendations/Intent for next treatment session: \"Next visit will focus on advancements to more challenging activities and reduction in assistance provided\". Continue with vaso pneumatic compression for decrease inflammation and ankle mobility.      Future Appointments  Date Time Provider Tay Aguayo   10/8/2018 8:00 AM Yosi Calvoace, PT [facility]   10/10/2018 8:00 AM Irais Primitivo Alpers [facility]   10/12/2018 8:00 AM Darlyne Furnace, PT [facility]   10/15/2018 8:00 AM Yosi Furnace, PT [facility]   10/17/2018 8:00 AM Tiny Heys Primitivo Alpers [facility]   10/19/2018 8:00 AM Tiny Heys Primitivo Alpers [facility]   10/22/2018 8:00 AM Tiny Heys Primitivo Alpers [facility]   10/24/2018 8:00 AM Irais Primitivo Alpers [facility]   10/26/2018 8:00 AM Irais Lindsay Alpers [facility]       Total Treatment Duration: 30 mins  PT Patient Time In/Time Out  Time In: 0920  Time Out: 909 Jeana Horner

## 2018-10-05 ENCOUNTER — APPOINTMENT (OUTPATIENT)
Dept: PHYSICAL THERAPY | Age: 48
End: 2018-10-05
Payer: COMMERCIAL

## 2018-10-08 ENCOUNTER — HOSPITAL ENCOUNTER (OUTPATIENT)
Dept: PHYSICAL THERAPY | Age: 48
Discharge: HOME | End: 2018-10-08
Payer: COMMERCIAL

## 2018-10-08 PROCEDURE — 97140 MANUAL THERAPY 1/> REGIONS: CPT

## 2018-10-08 PROCEDURE — 97110 THERAPEUTIC EXERCISES: CPT

## 2018-10-08 PROCEDURE — 97112 NEUROMUSCULAR REEDUCATION: CPT

## 2018-10-08 NOTE — PROGRESS NOTES
Rhiannon Pacheco  : 1970      Payor: Timothy Ramsey / Plan: Authur Scheuermann RPN / Product Type: Commerical /  [facility] at [address] [place] [address]. [facility], [address] [place] [address], [address] [place] [address]  [phone]   [phone]        OUTPATIENT PHYSICAL THERAPY  Daily Note, Visit # 12 :10/8/2018    ICD-10: Treatment Diagnosis:   Stiffness of left ankle, not elsewhere classified (M25.672)  Other abnormalities of gait and mobility (R26.89)  Pain in left ankle and joints of left foot (M25.572)  Effusion, left ankle (M25.472)  Precautions/Allergies:   Review of patient's allergies indicates no known allergies. Fall Risk Score: 2 (? 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 He has demonstrated slight increase in L ankle joint mobility, however it continues to be limited, as well as restrictions through achilles tendon. Gastroc/soleus demonstrate slight increase in strength but limitations in single leg stance due to decrease in ankle stability remain. Patient has returned to work as of 18 full time with restrictions (not specified by patient) as a . His job requires a lot of standing and lifting/pushing/pulling up to 30lbs.  Patient will continue to benefit from additonal skilled physical therapy 3x/wk x4wks for manual therapeutic techniques (as appropriate), therapeutic exercises and activities, neuromuscular re-education, balance training and comprehensive home exercises program to address current impairments and functional limitations to improve his functional strength with basic ADLs and work related functions. INITIAL ASSESSMENT:   Mr. Berta Gray presents to physical therapy s/p Left ankle exostectomy, Left triple arthrodesis, subtalar joint malunion osteotomy, and Left Achilles lengthening  on 7/2/18. He demonstrates L ankle/foot effusion, stiffness, antalgic gait, decreased strength, ROM, joint mobility, and functional mobility limiting his ability to complete basic ADLs. These S/S are consistent with L ankle stiffness, effusion, and pain, and antalgic gait. Patient will benefit from skilled physical therapy for manual therapeutic techniques (as appropriate), therapeutic exercises and activities, neuromuscular re-education, balance training and comprehensive home exercises program to address current impairments and functional limitations. Sary Brito will benefit from skilled PT (medically necessary) in order to address above deficits affecting participation in basic ADLs and overall functional tolerance. PROBLEM LIST (Impacting functional limitations):   1. Decreased Strength  2. Decreased Joint mobility/ROM  3. Decreased ADL/Functional Activities  4. Decreased Transfer Abilities  5. Decreased Ambulation Ability/Technique  6. Decreased Balance  7. Increased Pain  8. Decreased Activity Tolerance  9. Decreased [place] with Home Exercise Program INTERVENTIONS PLANNED:   1. Balance Exercise  2. Bed Mobility  3. Cold  4. Family Education  5. Gait Training  6. Heat  7. Home Exercise Program (HEP)  8. Manual Therapy  9. Neuromuscular Re-education/Strengthening  10. Range of Motion (ROM)  11. Therapeutic Activites  12. Therapeutic Exercise/Strengthening  13. Transfer Training  14. Ultrasound (US)   TREATMENT PLAN:  Effective Dates: 8/30/18 TO 10/29/2018 (60 days).   Frequency/Duration: 3 times a week for 60 Days  SHORT-TERM FUNCTIONAL GOALS: Time Frame: 4 weeks  Sary Brito will be compliant with home exercise program within 4 weeks in order to improve active participation with management of patient's symptoms and/or functional deficits. Tess Singleton will report <=3/10 pain with walking >15 minutes in order to participate in daily exercise and daily activities (met)  Tess Singleton will be able to stand >60 minutes with <2/10 pain to feet in order to participate in household duties without issues/compromise. Tess Singleton will report being able to sleep at night without waking up secondary to foot/ankle pain. (met)  Tess Singleton  will improve ankle PF/DF active ROM from 32/0 to [place]  will improve ankle strength from 4/5 to 4+/5 to improve tolerance of ADLs. (met)  Tess Singleton will be able to ascend/descend >8 steps with reciprocal pattern to demonstrate improvement in functional mobility at home. DISCHARGE GOALS: Time Frame: 8 weeks  Tess Singleton will be independent with home exercise program within 8 weeks in order to improve independence with management of patient's symptoms and/or functional deficits. Tess Singleton will report no pain with walking affecting participation in activities of daily living. Tess Singleton will be able to stand >60 minutes without onset of foot pain. Tess Singleton will be report improved score on FAAM from 34/84 to >60/84 to improve functional independence. HISTORY:   History of Present Injury/Illness (Reason for Referral): Tess Singleton presents to physical therapy s/p L ankle/foot reconstruction approximately 8 weeks ago. He states he broke his ankle 20 years ago but never sought treatment for it. He states it healed \"crooked\" so he has been walking on the side of his foot (medial) and after a while it began to really hurt him.  He states last year he sought care for it and was placed in a boot/cast for a while to re-align his foot, however that failed so he had it surgically reconstructed and referred to PT.     -Present symptoms/complaints (on day of evaluation)   Pain Scale:  · Current: 5/10  · Best: 1/10  · Worst: 6/10    · Aggravating factors: walking long distances, going up/down steps, squatting, raising up on toes,    · Relieving factors: rest      Past Medical History/Comorbidities:   Mr. Gualberto Salians  has a past medical history of Hypertension. Mr. Gualberto Salinas  has a past surgical history that includes hx hernia repair (1998) and hx colonoscopy., L ankle reconstruction (2018)    Active Ambulatory Problems     Diagnosis Date Noted    No Active Ambulatory Problems     Resolved Ambulatory Problems     Diagnosis Date Noted    No Resolved Ambulatory Problems     Past Medical History:   Diagnosis Date    Hypertension      Social History/Living Environment:        Social History     Social History    Marital status:      Spouse name: N/A    Number of children: N/A    Years of education: N/A     Occupational History    Not on file. Social History Main Topics    Smoking status: Never Smoker    Smokeless tobacco: Never Used    Alcohol use No    Drug use: No    Sexual activity: Not on file     Other Topics Concern    Not on file     Social History Narrative     Prior Level of Function/Work/Activity:  Not working  Hobbies: walking/travel  Dominant Side:         RIGHT  Other Clinical Tests  X-ray: healing good, per pt. Previous Treatment Approach   Corrective brace and surgery  Current Medications:    Current Outpatient Prescriptions:     losartan-hydroCHLOROthiazide (HYZAAR) 100-12.5 mg per tablet, Take 1 Tab by mouth daily. Indications: hypertension, Disp: , Rfl:      Date Last Reviewed:  10/8/2018   EXAMINATION:   Observation/Orthostatic Postural Assessment:  Assessed @ Initial Visit:      Gait Antalgic with improved toe-off in terminal stance on L, foot flat, and L hip ER    Standing; primarily WB through R LE and L in ER    Palpation:  Assessed @ Initial Visit: mild Tenderness to palpation along achilles T with restrictions noted.  Tenderness and restriction through plantar fascia and talocrual joint.  Surgical scars present good healing, however continued restricted in mobility/pliability                     AROM/PROM         Joint: Eval Date: 8/30/18  Re-Assess Date: 9/26/18  Re-Assess Date:    Active ROM RIGHT LEFT RIGHT LEFT RIGHT LEFT   Ankle Dorsiflexion 5 0  0     Ankle Plantarflexion 45 32  32     Ankle Inversion 6 0  1     Ankle Eversion 4 8  10                       Passive ROM         Ankle Dorsiflexion  1  4     Ankle Plantarflexion  32  32     Ankle Inversion  0  2     Ankle Eversion  8  10       Strength:     Eval Date: 8/30/18  Re-Assess Date: 9/26/18  Re-Assess Date:      RIGHT LEFT RIGHT LEFT RIGHT LEFT   Ankle Dorsiflexion 5/5 4/5  5/5     Ankle Plantarflexion 5/5 4+/5  4+/5     Ankle Inversion 5/5 4/5  4+/5     Ankle Eversion 5/5 4/5  4+/5     Knee Flexion 5/5 5/5       Knee Extension 5/5 5/5                  Joint Mobility Eval Date: 8/30/18  Re-Assess Date:  Re-Assess Date:     RIGHT LEFT RIGHT LEFT RIGHT LEFT   Subtalar  hypomobile  Hypomobile      Talocrual  hypomobile   hypomobile       Special Tests: Assessed @ Initial Visit:     Neurological Screen: Assessed @ Initial Visit No radiating symptoms down leg    Functional Mobility:  Assessed @ Initial Visit Limited tolerance of walking and standing  Heel/Toe walking= unable  Calf Raise (Dbl/Single)= Dbl unable  Balance:  Assessed @ Initial Visit   Single leg stance: R= 1-5seconds  L= 0 seconds   Body Structures Involved:  1. Bones  2. Joints  3. Muscles  4. Ligaments Body Functions Affected:  1. Sensory/Pain  2. Neuromusculoskeletal  3. Movement Related Activities and Participation Affected:  1. Mobility  2. Self Care   CLINICAL DECISION MAKING:   Tool Used: FOOT AND ANKLE ABILITY MEASURE (FAAM)  Score:  Initial: 34/84 Most Recent: X (Date: -- )   Interpretation of Score: For the \"Activities of Daily Living\", there are 21 questions each scored on a 5 point scale with 0 representing \"Unable to do\" and 4 representing \"No difficulty\". The lower the score, the greater the functional disability. 84/84 represents no disability. Minimal detectable change is 5.7 points. With the addition of the 8 questions in the \"Sports Subscale,\" there are 29 questions, each scored on a 5 point scale with 0 representing \"Unable to do\" and 4 representing \"No difficulty\". The lower the score, the greater the functional disability. 116/116 represents no disability. Minimal detectable change is 12.3 points. Activities of Daily Living:  Score 84 83-68 67-51 50-34 33-18 17-1 0   Modifier CH CI CJ CK CL CM CN     Activities of Daily Living + Sports Subscale:  Score 116 115-94 93-71 70-47 46-24 23-1 0   Modifier CH CI CJ CK CL CM CN     ? Mobility - Walking and Moving Around:     - CURRENT STATUS: CK - 40%-59% impaired, limited or restricted    - GOAL STATUS: CJ - 20%-39% impaired, limited or restricted    - D/C STATUS:  ---------------To be determined---------------      Medical Necessity:   · Patient is expected to demonstrate progress in strength, range of motion, balance, coordination and functional technique to increase independence with functional mobility at home to perform household duties and improve safety during ascending/descending stairs, walking on uneven ground, and squatting. .  Reason for Services/Other Comments:  · Patient requires manual therapeutic interventions as well as thereapeutic exercises/activities and gait training to improve patients saftey with ambulation in the community and functional mobility in and out of home.  .   TREATMENT:   (In addition to Assessment/Re-Assessment sessions the following treatments were rendered)      · Pre-Treatment Pain/ Symptoms: Pt states that he has no pain currently, doing pretty well with stairs and swelling is less but continues to have increased edema and mils pain with long distance walking   0/10       THERAPEUTIC EXERCISE: (15 minutes):  Exercises per grid below to improve mobility, strength and balance. Required minimal visual and verbal cues to promote proper body alignment, promote proper body posture and promote proper body mechanics. Progressed resistance, range, repetitions and complexity of movement as indicated. Date:  9/7/18 Date  9/10/18 Date  9/12/18 Date   9/14/18 Date:  9/17/18 Date  9/19/18 Date  9/26/18 Date  9/28/18 Date  8. 3.18 10.8.18   Exercise Parameters            Education       PRICE, HEP, re-assessment      Plantar fascia stretch 3x30\"            Calf stretch  3x30\" and HS stretch 3x30\" 3x30\" 3x30\" on slant board 3x30\" on slant board 3x30\" slant board 4x30\" with plantar fascia stretch 4x30\" with plantar fascia stretch     Towel curls 30x         NV   Seated calf raises  10x           Wobble board Seated 30x fwd/backward  30x side/side Seated 30x fwd/backward  30x side/side Standing 4 mins for balance fwd/backward Standing 2 mins for balance fwd/backward Standing 2 mins for balance fwd/backward  Med/lateral   Standing 2 mins for balance fwd/backward  Med/lateral   Standing 2x10 minimal UE support required today Seated PF x 20 with assist    Plantar flex Green band 30x         With wobble board    Bike 10 mins level 4 10 mins  Level 7 5 minutes Level 7 5 minutes Level 6 8 min Level 7 8 mins Level 5 6 mins  nustep level 3,  8 min    RDL Single leg    Picking up cone 13x Picking up cone 13x Picking up cone 13x  Picking up cone 15x     Wobble board         4 mins for ankle AROM in CKC, and 3 mins for balance fwd/bcwd Seated    Shuttle           75# 2 x 15  DL calf raise, DL squat    25# SL squat, SLcalf raise x 15                                Med[employer] Portal    Therapeutic Activity: (0 minutes):      Activity Date:  9/5/18 Date:  9/7/18 Date:  9/10/18 Date  9/12/18 Date:  9/14/18 Date  9/17/18 Date  9/19/18 Date  9/26/18 Date  9/28/18   Exercise Parameters Parameters Parameters         STS No UE support 2x10;equal WB  20x L foot back for increase WB          Step overs Heel/toe pattern 20x each   Cone, step-to pattern; 10ft 8x Cone, step-to pattern; 10ft 8x    Side to side lateral 8x. Cone, step-to pattern; 10ft 8x    Side to side lateral 8x. Cone, step-to pattern with SL 3\" balance; 10ft 8x    Side to side lateral 8x. Cone, step-to pattern with SL 3\" balance; 10ft 8x    Cone taps  2x10 for SL balance 20x for SL balance 20x for SL balance 20x slow  25 x   Multi-direction 20x   Seated heel raises  30x 20x     15x10\"    Tight rope walking   20ft 4x 30ft 4x Heel-toe 30' Heel toe 30' Heel-toe 40ft     Lunge on step   10x10\" for tibial translation 10x10\" for tibial translation        Gait training   50ft for heel/toe and hip/knee flex         Heel/toe walking    15ft        SL Balance rolling ball with R LE     Forward and back 10x (CGA by PT) Forward and back 10x (CGA by PT) Forward and back 10x (SBA by PT) Forward and back 20x (SBA by PT) Forward and back 20x (SBA by PT)   SLS      NV 30x3  10x5\" 3 x 30 sec on foam   Step ups       4 inch L SL      Fwd Lunge         Onto airex 30x Onto airex 30x     Neuromuscular Education:x 15 min to improved proprioception and ankle strategy   Blue foam:  ·  DL NBOS, eyes open/closed with CGA  · SLS 3 x 30 sec       Manual Therapy Interventions: (15 minutes): Manual interventions per grid below performed to improve joint/soft tissue mobility and ROM to improve ability to perform ADLs. Patient responded well to all manual interventions with no significant increase in pain/symptoms. Improved ankle/foot ROM demonstrated following interventions and decrease in pain scale listed below.                Manual Intervention Date:  8/30/18 Date:  9/5/18 Date:  9/7/18 Date  9/10/18 Date  9/12/18 9/14/18 9.17.18 9/26/18 9.28.18 10.3.18 10/8/18   Soft tissue mobilization Joint Mobility Parameters Parameters Parameters            Talocrual A/P Grade III A/P Grade III A/P grade III A/P grade III A/P grade III A/P grade III med/lat grade III  A/P grade III med/lat grade III  A/P grade IV med/lat grade III  A/P grade IV med/lat grade III  A/P grade IV med/lat grade III  A/P grade IV med/lat grade III    Subtalar Med/lat grade III Med/lat Grade III-IV Med/lat grade III-IV Med/lst grade III-IV Med/lat grade III   Med/lat grade IV Med/lat grade IV Med/lat grade IV Med/lat grade IV   Plantar fascia  Release  release release release         Gastroc/soleus   Strumming/release Strumming/release strumming     Strumming/release Strumming/release     anterior tib            Deep pressure for inhabition                                    MODALITIES: (0 minutes):      vasopuematic compression for decrease inflammation/swelling and pain to improve overall ankle mobility and standing tolerance. Declined by pt today  Location= L ankle  Skin intact pre and post treatment with no adverse symptoms  Decrease swelling noted afterward and pain 0/10    Treatment/Session Assessment: pt with increased tone of anterior tibialis, required manual cuing to inhibit during exercises and improved with repetition. gastroc appeared fatigued at end of session. May benefit from t-band [facility] to promote control and decreased tone of anterior tib. · Pain: Post Session report:  Pain: 0/10      GOOD ·   Compliance with Program/Exercises: Compliant  · Recommendations/Intent for next treatment session: \"Next visit will focus on advancements to more challenging activities and reduction in assistance provided\". Continue with vaso pneumatic compression for decrease inflammation and ankle mobility.      Future Appointments  Date Time Provider Tay Aguayo   10/10/2018 8:00 AM Irais Cabrera [facility]   10/12/2018 8:00 AM [facility]   10/15/2018 8:00 AM Theone [facility]rit[facility]   10/17/2018 8:00 AM Melo Lupe Cabrera [facility]   10/19/2018 8:00 AM Melo Pledominique Cabrera [facility]   10/22/2018 8:00 AM Melo Pledominique Henry Deborah [facility] 10/24/2018 8:00 AM Irais Ornelas [facility]   10/26/2018 8:00 AM Kylie Percy Alpers [facility]       Total Treatment Duration: 45 min  PT Patient Time In/Time Out  Time In: 0800  Time Out: [address], [address] [place]

## 2018-10-10 ENCOUNTER — HOSPITAL ENCOUNTER (OUTPATIENT)
Dept: PHYSICAL THERAPY | Age: 48
Discharge: HOME | End: 2018-10-10
Payer: COMMERCIAL

## 2018-10-10 PROCEDURE — 97112 NEUROMUSCULAR REEDUCATION: CPT

## 2018-10-10 PROCEDURE — 97110 THERAPEUTIC EXERCISES: CPT

## 2018-10-10 PROCEDURE — 97140 MANUAL THERAPY 1/> REGIONS: CPT

## 2018-10-10 NOTE — PROGRESS NOTES
Francois Santiago  : 1970      Payor: Dee Sultana / Plan: Shira Quinteros RPN / Product Type: Commerical /  [id number] [facility] [address] at [place] [address]. [facility], [address], [facility] [place], [address] [place] [address]  [phone]   [phone]        OUTPATIENT PHYSICAL THERAPY  Daily Note, Visit # 13 :10/10/2018    ICD-10: Treatment Diagnosis:   Stiffness of left ankle, not elsewhere classified (S36.533)  Other abnormalities of gait and mobility (R26.89)  Pain in left ankle and joints of left foot (M25.572)  Effusion, left ankle (M25.472)  Precautions/Allergies:   Review of patient's allergies indicates no known allergies. Fall Risk Score: 2 (? 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 He has demonstrated slight increase in L ankle joint mobility, however it continues to be limited, as well as restrictions through achilles tendon. Gastroc/soleus demonstrate slight increase in strength but limitations in single leg stance due to decrease in ankle stability remain. Patient has returned to work as of 18 full time with restrictions (not specified by patient) as a . His job requires a lot of standing and lifting/pushing/pulling up to 30lbs.  Patient will continue to benefit from additonal skilled physical therapy 3x/wk x4wks for manual therapeutic techniques (as appropriate), therapeutic exercises and activities, neuromuscular re-education, balance training and comprehensive home exercises program to address current impairments and functional limitations to improve his functional strength with basic ADLs and work related functions. INITIAL ASSESSMENT:   Mr. Saundra Birmingham presents to physical therapy s/p Left ankle exostectomy, Left triple arthrodesis, subtalar joint malunion osteotomy, and Left Achilles lengthening  on 7/2/18. He demonstrates L ankle/foot effusion, stiffness, antalgic gait, decreased strength, ROM, joint mobility, and functional mobility limiting his ability to complete basic ADLs. These S/S are consistent with L ankle stiffness, effusion, and pain, and antalgic gait. Patient will benefit from skilled physical therapy for manual therapeutic techniques (as appropriate), therapeutic exercises and activities, neuromuscular re-education, balance training and comprehensive home exercises program to address current impairments and functional limitations. Fredo Brooks will benefit from skilled PT (medically necessary) in order to address above deficits affecting participation in basic ADLs and overall functional tolerance. PROBLEM LIST (Impacting functional limitations):   1. Decreased Strength  2. Decreased Joint mobility/ROM  3. Decreased ADL/Functional Activities  4. Decreased Transfer Abilities  5. Decreased Ambulation Ability/Technique  6. Decreased Balance  7. Increased Pain  8. Decreased Activity Tolerance  9. Decreased [place] with Home Exercise Program INTERVENTIONS PLANNED:   1. Balance Exercise  2. Bed Mobility  3. Cold  4. Family Education  5. Gait Training  6. Heat  7. Home Exercise Program (HEP)  8. Manual Therapy  9. Neuromuscular Re-education/Strengthening  10. Range of Motion (ROM)  11. Therapeutic Activites  12. Therapeutic Exercise/Strengthening  13. Transfer Training  14. Ultrasound (US)   TREATMENT PLAN:  Effective Dates: 8/30/18 TO 10/29/2018 (60 days).   Frequency/Duration: 3 times a week for 60 Days  SHORT-TERM FUNCTIONAL GOALS: Time Frame: 4 weeks  Fredo Brooks will be compliant with home exercise program within 4 weeks in order to improve active participation with management of patient's symptoms and/or functional deficits. Maksim Allen will report <=3/10 pain with walking >15 minutes in order to participate in daily exercise and daily activities (met)  Maksim Allen will be able to stand >60 minutes with <2/10 pain to feet in order to participate in household duties without issues/compromise. Maksim Allen will report being able to sleep at night without waking up secondary to foot/ankle pain. (met)  Maksim Allen  will improve ankle PF/DF active ROM from 32/0 to [place]  will improve ankle strength from 4/5 to 4+/5 to improve tolerance of ADLs. (met)  Maksim Allen will be able to ascend/descend >8 steps with reciprocal pattern to demonstrate improvement in functional mobility at home. DISCHARGE GOALS: Time Frame: 8 weeks  Maksmi Allen will be independent with home exercise program within 8 weeks in order to improve independence with management of patient's symptoms and/or functional deficits. Maksim Allen will report no pain with walking affecting participation in activities of daily living. Maksim Allen will be able to stand >60 minutes without onset of foot pain. Maksim Allen will be report improved score on FAAM from 34/84 to >60/84 to improve functional independence. HISTORY:   History of Present Injury/Illness (Reason for Referral): Maksim Allen presents to physical therapy s/p L ankle/foot reconstruction approximately 8 weeks ago. He states he broke his ankle 20 years ago but never sought treatment for it. He states it healed \"crooked\" so he has been walking on the side of his foot (medial) and after a while it began to really hurt him.  He states last year he sought care for it and was placed in a boot/cast for a while to re-align his foot, however that failed so he had it surgically reconstructed and referred to PT.     -Present symptoms/complaints (on day of evaluation)   Pain Scale:  · Current: 5/10  · Best: 1/10  · Worst: 6/10    · Aggravating factors: walking long distances, going up/down steps, squatting, raising up on toes,    · Relieving factors: rest      Past Medical History/Comorbidities:   Mr. Salo Haas  has a past medical history of Hypertension. Mr. Salo Haas  has a past surgical history that includes hx hernia repair (1998) and hx colonoscopy., L ankle reconstruction (2018)    Active Ambulatory Problems     Diagnosis Date Noted    No Active Ambulatory Problems     Resolved Ambulatory Problems     Diagnosis Date Noted    No Resolved Ambulatory Problems     Past Medical History:   Diagnosis Date    Hypertension      Social History/Living Environment:        Social History     Social History    Marital status:      Spouse name: N/A    Number of children: N/A    Years of education: N/A     Occupational History    Not on file. Social History Main Topics    Smoking status: Never Smoker    Smokeless tobacco: Never Used    Alcohol use No    Drug use: No    Sexual activity: Not on file     Other Topics Concern    Not on file     Social History Narrative     Prior Level of Function/Work/Activity:  Not working  Hobbies: walking/travel  Dominant Side:         RIGHT  Other Clinical Tests  X-ray: healing good, per pt. Previous Treatment Approach   Corrective brace and surgery  Current Medications:    Current Outpatient Prescriptions:     losartan-hydroCHLOROthiazide (HYZAAR) 100-12.5 mg per tablet, Take 1 Tab by mouth daily. Indications: hypertension, Disp: , Rfl:      Date Last Reviewed:  10/10/2018   EXAMINATION:   Observation/Orthostatic Postural Assessment:  Assessed @ Initial Visit:      Gait Antalgic with improved toe-off in terminal stance on L, foot flat, and L hip ER    Standing; primarily WB through R LE and L in ER    Palpation:  Assessed @ Initial Visit: mild Tenderness to palpation along achilles T with restrictions noted.  Tenderness and restriction through plantar fascia and talocrual joint.  Surgical scars present good healing, however continued restricted in mobility/pliability                     AROM/PROM         Joint: Eval Date: 8/30/18  Re-Assess Date: 9/26/18  Re-Assess Date:    Active ROM RIGHT LEFT RIGHT LEFT RIGHT LEFT   Ankle Dorsiflexion 5 0  0     Ankle Plantarflexion 45 32  32     Ankle Inversion 6 0  1     Ankle Eversion 4 8  10                       Passive ROM         Ankle Dorsiflexion  1  4     Ankle Plantarflexion  32  32     Ankle Inversion  0  2     Ankle Eversion  8  10       Strength:     Eval Date: 8/30/18  Re-Assess Date: 9/26/18  Re-Assess Date:      RIGHT LEFT RIGHT LEFT RIGHT LEFT   Ankle Dorsiflexion 5/5 4/5  5/5     Ankle Plantarflexion 5/5 4+/5  4+/5     Ankle Inversion 5/5 4/5  4+/5     Ankle Eversion 5/5 4/5  4+/5     Knee Flexion 5/5 5/5       Knee Extension 5/5 5/5                  Joint Mobility Eval Date: 8/30/18  Re-Assess Date:  Re-Assess Date:     RIGHT LEFT RIGHT LEFT RIGHT LEFT   Subtalar  hypomobile  Hypomobile      Talocrual  hypomobile   hypomobile       Special Tests: Assessed @ Initial Visit:     Neurological Screen: Assessed @ Initial Visit No radiating symptoms down leg    Functional Mobility:  Assessed @ Initial Visit Limited tolerance of walking and standing  Heel/Toe walking= unable  Calf Raise (Dbl/Single)= Dbl unable  Balance:  Assessed @ Initial Visit   Single leg stance: R= 1-5seconds  L= 0 seconds   Body Structures Involved:  1. Bones  2. Joints  3. Muscles  4. Ligaments Body Functions Affected:  1. Sensory/Pain  2. Neuromusculoskeletal  3. Movement Related Activities and Participation Affected:  1. Mobility  2. Self Care   CLINICAL DECISION MAKING:   Tool Used: FOOT AND ANKLE ABILITY MEASURE (FAAM)  Score:  Initial: 34/84 Most Recent: X (Date: -- )   Interpretation of Score: For the \"Activities of Daily Living\", there are 21 questions each scored on a 5 point scale with 0 representing \"Unable to do\" and 4 representing \"No difficulty\". The lower the score, the greater the functional disability. 84/84 represents no disability. Minimal detectable change is 5.7 points. With the addition of the 8 questions in the \"Sports Subscale,\" there are 29 questions, each scored on a 5 point scale with 0 representing \"Unable to do\" and 4 representing \"No difficulty\". The lower the score, the greater the functional disability. 116/116 represents no disability. Minimal detectable change is 12.3 points. Activities of Daily Living:  Score 84 83-68 67-51 50-34 33-18 17-1 0   Modifier CH CI CJ CK CL CM CN     Activities of Daily Living + Sports Subscale:  Score 116 115-94 93-71 70-47 46-24 23-1 0   Modifier CH CI CJ CK CL CM CN     ? Mobility - Walking and Moving Around:     - CURRENT STATUS: CK - 40%-59% impaired, limited or restricted    - GOAL STATUS: CJ - 20%-39% impaired, limited or restricted    - D/C STATUS:  ---------------To be determined---------------      Medical Necessity:   · Patient is expected to demonstrate progress in strength, range of motion, balance, coordination and functional technique to increase independence with functional mobility at home to perform household duties and improve safety during ascending/descending stairs, walking on uneven ground, and squatting. .  Reason for Services/Other Comments:  · Patient requires manual therapeutic interventions as well as thereapeutic exercises/activities and gait training to improve patients saftey with ambulation in the community and functional mobility in and out of home. .   TREATMENT:   (In addition to Assessment/Re-Assessment sessions the following treatments were rendered)      · Pre-Treatment Pain/ Symptoms: Pt states doing great. 0/10       THERAPEUTIC EXERCISE: (25 minutes):  Exercises per grid below to improve mobility, strength and balance.   Required minimal visual and verbal cues to promote proper body alignment, promote proper body posture and promote proper body mechanics. Progressed resistance, range, repetitions and complexity of movement as indicated. Date:  9/7/18 Date  9/10/18 Date  9/12/18 Date   9/14/18 Date:  9/17/18 Date  9/19/18 Date  9/26/18 Date  9/28/18 Date  8. 3.18 10.8.18 Date  10/10/18   Exercise Parameters             Education       PRICE, HEP, re-assessment    HEP progression   Plantar fascia stretch 3x30\"             Calf stretch  3x30\" and HS stretch 3x30\" 3x30\" 3x30\" on slant board 3x30\" on slant board 3x30\" slant board 4x30\" with plantar fascia stretch 4x30\" with plantar fascia stretch   3x30\" slant board; 3x30\" bckwards for tib ant stretch   Towel curls 30x         NV    Seated calf raises  10x            Wobble board Seated 30x fwd/backward  30x side/side Seated 30x fwd/backward  30x side/side Standing 4 mins for balance fwd/backward Standing 2 mins for balance fwd/backward Standing 2 mins for balance fwd/backward  Med/lateral   Standing 2 mins for balance fwd/backward  Med/lateral   Standing 2x10 minimal UE support required today Seated PF x 20 with assist     Plantar flex Green band 30x         With wobble board  Blue band 10x10\" PF hold and ecc release   Bike 10 mins level 4 10 mins  Level 7 5 minutes Level 7 5 minutes Level 6 8 min Level 7 8 mins Level 5 6 mins  nustep level 3,  8 min     RDL Single leg    Picking up cone 13x Picking up cone 13x Picking up cone 13x  Picking up cone 15x      Wobble board         4 mins for ankle AROM in CKC, and 3 mins for balance fwd/bcwd Seated     Shuttle           75# 2 x 15  DL calf raise, DL squat    25# SL squat, SLcalf raise x 15 25# SL calf raise 15x  75# 2x10 DL calf raise  100# DL squat 2x10-TKE focus   SLR           20x   HS stretch                 Med[employer] Portal    Therapeutic Activity: (0 minutes):      Activity Date:  9/5/18 Date:  9/7/18 Date:  9/10/18 Date  9/12/18 Date:  9/14/18 Date  9/17/18 Date  9/19/18 Date  9/26/18 Date  9/28/18   Exercise Parameters Parameters Parameters         STS No UE support 2x10;equal WB  20x L foot back for increase WB          Step overs Heel/toe pattern 20x each   Cone, step-to pattern; 10ft 8x Cone, step-to pattern; 10ft 8x    Side to side lateral 8x. Cone, step-to pattern; 10ft 8x    Side to side lateral 8x. Cone, step-to pattern with SL 3\" balance; 10ft 8x    Side to side lateral 8x. Cone, step-to pattern with SL 3\" balance; 10ft 8x    Cone taps  2x10 for SL balance 20x for SL balance 20x for SL balance 20x slow  25 x   Multi-direction 20x   Seated heel raises  30x 20x     15x10\"    Tight rope walking   20ft 4x 30ft 4x Heel-toe 30' Heel toe 30' Heel-toe 40ft     Lunge on step   10x10\" for tibial translation 10x10\" for tibial translation        Gait training   50ft for heel/toe and hip/knee flex         Heel/toe walking    15ft        SL Balance rolling ball with R LE     Forward and back 10x (CGA by PT) Forward and back 10x (CGA by PT) Forward and back 10x (SBA by PT) Forward and back 20x (SBA by PT) Forward and back 20x (SBA by PT)   SLS      NV 30x3  10x5\" 3 x 30 sec on foam   Step ups       4 inch L SL      Fwd Lunge         Onto airex 30x Onto airex 30x     Neuromuscular Education:x 8 min to improved proprioception and ankle strategy   · Manual PF/DF resistance with isometric holds for isolated strengthening        Manual Therapy Interventions: (17 minutes): Manual interventions per grid below performed to improve joint/soft tissue mobility and ROM to improve ability to perform ADLs. Patient responded well to all manual interventions with no significant increase in pain/symptoms. Improved ankle/foot ROM demonstrated following interventions and decrease in pain scale listed below.                Manual Intervention Date:  8/30/18 Date:  9/5/18 Date:  9/7/18 Date  9/10/18 Date  9/12/18 9/14/18 9.17.18 9/26/18 9.28.18 10.3.18 10/8/18 10/10/18   Soft tissue mobilization Joint Mobility Parameters Parameters Parameters             Talocrual A/P Grade III A/P Grade III A/P grade III A/P grade III A/P grade III A/P grade III med/lat grade III  A/P grade III med/lat grade III  A/P grade IV med/lat grade III  A/P grade IV med/lat grade III  A/P grade IV med/lat grade III  A/P grade IV med/lat grade III P/A grade IV med/lat grade III    Subtalar Med/lat grade III Med/lat Grade III-IV Med/lat grade III-IV Med/lst grade III-IV Med/lat grade III   Med/lat grade IV Med/lat grade IV Med/lat grade IV Med/lat grade IV Med/lat grade IV   Plantar fascia  Release  release release release          Gastroc/soleus   Strumming/release Strumming/release strumming     Strumming/release Strumming/release      anterior tib            Deep pressure for inhabition  Deep pressure for inhabition ; contract/relax stretching                                     MODALITIES: (0 minutes):      vasopuematic compression for decrease inflammation/swelling and pain to improve overall ankle mobility and standing tolerance. Declined by pt today  Location= L ankle  Skin intact pre and post treatment with no adverse symptoms  Decrease swelling noted afterward and pain 0/10    Treatment/Session Assessment: Continued increase tib anterior tone today. Neuromuscular re-education for isolated muscular contraction. Pt demonstrates difficulty maintaining TKE with DL squats on shuttle secondary to quad weakness and HS tightness. Pt educated on HS stretches and SLRs to add to HEP as well as PF concentric and eccentric strengthening with blue theraband for increasing gastroc/soleus strength and reducing tibialis anterior tone. · Pain: Post Session report:  Pain: 0/10      GOOD ·   Compliance with Program/Exercises: Compliant  · Recommendations/Intent for next treatment session: \"Next visit will focus on advancements to more challenging activities and reduction in assistance provided\". Continue with reducing tibialis anterior tone as indicated and gastroc/soleus strengthening.      Future Appointments  Date Time Provider Tay Aguayo   10/12/2018 8:00 AM Ansley Tam, PT [facility]   10[facility]15/2018 8[facility]00 [facility]   10[facility]17/2018 8:00 AM [facility]   10/19/2018 8:00 AM [facility]   10/22/2018 8:00 AM [facility]   10/24/2018 8:00 [facility]   10/26/2018 8:00 AM Irais Murphy [facility]       Total Treatment Duration: 50 min  PT Patient Time In/Time Out  Time In: 0800  Time Out: 0900    Analy Barrett

## 2018-10-12 ENCOUNTER — HOSPITAL ENCOUNTER (OUTPATIENT)
Dept: PHYSICAL THERAPY | Age: 48
Discharge: HOME | End: 2018-10-12
Payer: COMMERCIAL

## 2018-10-12 PROCEDURE — 97110 THERAPEUTIC EXERCISES: CPT

## 2018-10-12 PROCEDURE — 97140 MANUAL THERAPY 1/> REGIONS: CPT

## 2018-10-12 PROCEDURE — 97530 THERAPEUTIC ACTIVITIES: CPT

## 2018-10-12 NOTE — PROGRESS NOTES
Sarah Beth Salinas  : 1970      Payor: Gomez Palma / Plan: Oc Patricio RPN / Product Type: Commerical /  Annye Jointer at [facility]. [facility], [address], [facility], [address] [place] [address]  [phone]   [phone]        OUTPATIENT PHYSICAL THERAPY  Daily Note, Visit # 14 :10/12/2018    ICD-10: Treatment Diagnosis:   Stiffness of left ankle, not elsewhere classified (M25.672)  Other abnormalities of gait and mobility (R26.89)  Pain in left ankle and joints of left foot (M25.572)  Effusion, left ankle (M25.472)  Precautions/Allergies:   Review of patient's allergies indicates no known allergies. Fall Risk Score: 2 (? 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 He has demonstrated slight increase in L ankle joint mobility, however it continues to be limited, as well as restrictions through achilles tendon. Gastroc/soleus demonstrate slight increase in strength but limitations in single leg stance due to decrease in ankle stability remain. Patient has returned to work as of 18 full time with restrictions (not specified by patient) as a . His job requires a lot of standing and lifting/pushing/pulling up to 30lbs.  Patient will continue to benefit from additonal skilled physical therapy 3x/wk x4wks for manual therapeutic techniques (as appropriate), therapeutic exercises and activities, neuromuscular re-education, balance training and comprehensive home exercises program to address current impairments and functional limitations to improve his functional strength with basic ADLs and work related functions. INITIAL ASSESSMENT:   Mr. Alyssa Coles presents to physical therapy s/p Left ankle exostectomy, Left triple arthrodesis, subtalar joint malunion osteotomy, and Left Achilles lengthening  on 7/2/18. He demonstrates L ankle/foot effusion, stiffness, antalgic gait, decreased strength, ROM, joint mobility, and functional mobility limiting his ability to complete basic ADLs. These S/S are consistent with L ankle stiffness, effusion, and pain, and antalgic gait. Patient will benefit from skilled physical therapy for manual therapeutic techniques (as appropriate), therapeutic exercises and activities, neuromuscular re-education, balance training and comprehensive home exercises program to address current impairments and functional limitations. Dana Car will benefit from skilled PT (medically necessary) in order to address above deficits affecting participation in basic ADLs and overall functional tolerance. PROBLEM LIST (Impacting functional limitations):   1. Decreased Strength  2. Decreased Joint mobility/ROM  3. Decreased ADL/Functional Activities  4. Decreased Transfer Abilities  5. Decreased Ambulation Ability/Technique  6. Decreased Balance  7. Increased Pain  8. Decreased Activity Tolerance  9. Decreased [place] with Home Exercise Program INTERVENTIONS PLANNED:   1. Balance Exercise  2. Bed Mobility  3. Cold  4. Family Education  5. Gait Training  6. Heat  7. Home Exercise Program (HEP)  8. Manual Therapy  9. Neuromuscular Re-education/Strengthening  10. Range of Motion (ROM)  11. Therapeutic Activites  12. Therapeutic Exercise/Strengthening  13. Transfer Training  14. Ultrasound (US)   TREATMENT PLAN:  Effective Dates: 8/30/18 TO 10/29/2018 (60 days).   Frequency/Duration: 3 times a week for 60 Days  SHORT-TERM FUNCTIONAL GOALS: Time Frame: 4 weeks  Dana Car will be compliant with home exercise program within 4 weeks in order to improve active participation with management of patient's symptoms and/or functional deficits. Tasneem Goldberg will report <=3/10 pain with walking >15 minutes in order to participate in daily exercise and daily activities (met)  Tasneem Goldberg will be able to stand >60 minutes with <2/10 pain to feet in order to participate in household duties without issues/compromise. Tasneem Goldberg will report being able to sleep at night without waking up secondary to foot/ankle pain. (met)  Tasneem Goldberg  will improve ankle PF/DF active ROM from 32/0 to [place]  will improve ankle strength from 4/5 to 4+/5 to improve tolerance of ADLs. (met)  Tasneem Goldberg will be able to ascend/descend >8 steps with reciprocal pattern to demonstrate improvement in functional mobility at home. DISCHARGE GOALS: Time Frame: 8 weeks  Tasneem Goldberg will be independent with home exercise program within 8 weeks in order to improve independence with management of patient's symptoms and/or functional deficits. Tasneem Goldberg will report no pain with walking affecting participation in activities of daily living. Tasneem Goldberg will be able to stand >60 minutes without onset of foot pain. Tasneem Goldberg will be report improved score on FAAM from 34/84 to >60/84 to improve functional independence. HISTORY:   History of Present Injury/Illness (Reason for Referral): Tasneem Goldberg presents to physical therapy s/p L ankle/foot reconstruction approximately 8 weeks ago. He states he broke his ankle 20 years ago but never sought treatment for it. He states it healed \"crooked\" so he has been walking on the side of his foot (medial) and after a while it began to really hurt him.  He states last year he sought care for it and was placed in a boot/cast for a while to re-align his foot, however that failed so he had it surgically reconstructed and referred to PT.     -Present symptoms/complaints (on day of evaluation)   Pain Scale:  · Current: 5/10  · Best: 1/10  · Worst: 6/10    · Aggravating factors: walking long distances, going up/down steps, squatting, raising up on toes,    · Relieving factors: rest      Past Medical History/Comorbidities:   Mr. Salo Haas  has a past medical history of Hypertension. Mr. Salo Haas  has a past surgical history that includes hx hernia repair (1998) and hx colonoscopy., L ankle reconstruction (2018)    Active Ambulatory Problems     Diagnosis Date Noted    No Active Ambulatory Problems     Resolved Ambulatory Problems     Diagnosis Date Noted    No Resolved Ambulatory Problems     Past Medical History:   Diagnosis Date    Hypertension      Social History/Living Environment:        Social History     Social History    Marital status:      Spouse name: N/A    Number of children: N/A    Years of education: N/A     Occupational History    Not on file. Social History Main Topics    Smoking status: Never Smoker    Smokeless tobacco: Never Used    Alcohol use No    Drug use: No    Sexual activity: Not on file     Other Topics Concern    Not on file     Social History Narrative     Prior Level of Function/Work/Activity:  Not working  Hobbies: walking/travel  Dominant Side:         RIGHT  Other Clinical Tests  X-ray: healing good, per pt. Previous Treatment Approach   Corrective brace and surgery  Current Medications:    Current Outpatient Prescriptions:     losartan-hydroCHLOROthiazide (HYZAAR) 100-12.5 mg per tablet, Take 1 Tab by mouth daily. Indications: hypertension, Disp: , Rfl:      Date Last Reviewed:  10/12/2018   EXAMINATION:   Observation/Orthostatic Postural Assessment:  Assessed @ Initial Visit:      Gait Antalgic with improved toe-off in terminal stance on L, foot flat, and L hip ER    Standing; primarily WB through R LE and L in ER    Palpation:  Assessed @ Initial Visit: mild Tenderness to palpation along achilles T with restrictions noted.  Tenderness and restriction through plantar fascia and talocrual joint.  Surgical scars present good healing, however continued restricted in mobility/pliability                     AROM/PROM         Joint: Eval Date: 8/30/18  Re-Assess Date: 9/26/18  Re-Assess Date:    Active ROM RIGHT LEFT RIGHT LEFT RIGHT LEFT   Ankle Dorsiflexion 5 0  0     Ankle Plantarflexion 45 32  32     Ankle Inversion 6 0  1     Ankle Eversion 4 8  10                       Passive ROM         Ankle Dorsiflexion  1  4     Ankle Plantarflexion  32  32     Ankle Inversion  0  2     Ankle Eversion  8  10       Strength:     Eval Date: 8/30/18  Re-Assess Date: 9/26/18  Re-Assess Date:      RIGHT LEFT RIGHT LEFT RIGHT LEFT   Ankle Dorsiflexion 5/5 4/5  5/5     Ankle Plantarflexion 5/5 4+/5  4+/5     Ankle Inversion 5/5 4/5  4+/5     Ankle Eversion 5/5 4/5  4+/5     Knee Flexion 5/5 5/5       Knee Extension 5/5 5/5                  Joint Mobility Eval Date: 8/30/18  Re-Assess Date:  Re-Assess Date:     RIGHT LEFT RIGHT LEFT RIGHT LEFT   Subtalar  hypomobile  Hypomobile      Talocrual  hypomobile   hypomobile       Special Tests: Assessed @ Initial Visit:     Neurological Screen: Assessed @ Initial Visit No radiating symptoms down leg    Functional Mobility:  Assessed @ Initial Visit Limited tolerance of walking and standing  Heel/Toe walking= unable  Calf Raise (Dbl/Single)= Dbl unable  Balance:  Assessed @ Initial Visit   Single leg stance: R= 1-5seconds  L= 0 seconds   Body Structures Involved:  1. Bones  2. Joints  3. Muscles  4. Ligaments Body Functions Affected:  1. Sensory/Pain  2. Neuromusculoskeletal  3. Movement Related Activities and Participation Affected:  1. Mobility  2. Self Care   CLINICAL DECISION MAKING:   Tool Used: FOOT AND ANKLE ABILITY MEASURE (FAAM)  Score:  Initial: 34/84 Most Recent: X (Date: -- )   Interpretation of Score: For the \"Activities of Daily Living\", there are 21 questions each scored on a 5 point scale with 0 representing \"Unable to do\" and 4 representing \"No difficulty\". The lower the score, the greater the functional disability. 84/84 represents no disability. Minimal detectable change is 5.7 points. With the addition of the 8 questions in the \"Sports Subscale,\" there are 29 questions, each scored on a 5 point scale with 0 representing \"Unable to do\" and 4 representing \"No difficulty\". The lower the score, the greater the functional disability. 116/116 represents no disability. Minimal detectable change is 12.3 points. Activities of Daily Living:  Score 84 83-68 67-51 50-34 33-18 17-1 0   Modifier CH CI CJ CK CL CM CN     Activities of Daily Living + Sports Subscale:  Score 116 115-94 93-71 70-47 46-24 23-1 0   Modifier CH CI CJ CK CL CM CN     ? Mobility - Walking and Moving Around:     - CURRENT STATUS: CK - 40%-59% impaired, limited or restricted    - GOAL STATUS: CJ - 20%-39% impaired, limited or restricted    - D/C STATUS:  ---------------To be determined---------------      Medical Necessity:   · Patient is expected to demonstrate progress in strength, range of motion, balance, coordination and functional technique to increase independence with functional mobility at home to perform household duties and improve safety during ascending/descending stairs, walking on uneven ground, and squatting. .  Reason for Services/Other Comments:  · Patient requires manual therapeutic interventions as well as thereapeutic exercises/activities and gait training to improve patients saftey with ambulation in the community and functional mobility in and out of home. .   TREATMENT:   (In addition to Assessment/Re-Assessment sessions the following treatments were rendered)      · Pre-Treatment Pain/ Symptoms: Pt states doing great. 0/10       THERAPEUTIC EXERCISE: (20 minutes):  Exercises per grid below to improve mobility, strength and balance.   Required minimal visual and verbal cues to promote proper body alignment, promote proper body posture and promote proper body mechanics. Progressed resistance, range, repetitions and complexity of movement as indicated. Date:  9/7/18 Date  9/10/18 Date  9/12/18 Date   9/14/18 Date:  9/17/18 Date  9/19/18 Date  9/26/18 Date  9/28/18 Date  8. 3.18 10.8.18 Date  10/10/18 10/12/18   Exercise Parameters              Education       PRICE, HEP, re-assessment    HEP progression    Plantar fascia stretch 3x30\"              Calf stretch  3x30\" and HS stretch 3x30\" 3x30\" 3x30\" on slant board 3x30\" on slant board 3x30\" slant board 4x30\" with plantar fascia stretch 4x30\" with plantar fascia stretch   3x30\" slant board; 3x30\" bckwards for tib ant stretch    Towel curls 30x         NV     Seated calf raises  10x          X 10   baps board Seated 30x fwd/backward  30x side/side Seated 30x fwd/backward  30x side/side Standing 4 mins for balance fwd/backward Standing 2 mins for balance fwd/backward Standing 2 mins for balance fwd/backward  Med/lateral   Standing 2 mins for balance fwd/backward  Med/lateral   Standing 2x10 minimal UE support required today Seated PF x 20 with assist   Seated x 10 PF with 5 sec hold at end of range to work on endurance of gastroc   Plantar flex Green band 30x         With wobble board  Blue band 10x10\" PF hold and ecc release With baps board and manual resist    Bike 10 mins level 4 10 mins  Level 7 5 minutes Level 7 5 minutes Level 6 8 min Level 7 8 mins Level 5 6 mins  nustep level 3,  8 min   nustep level 5 x 8 min   RDL Single leg    Picking up cone 13x Picking up cone 13x Picking up cone 13x  Picking up cone 15x       Wobble board         4 mins for ankle AROM in CKC, and 3 mins for balance fwd/bcwd Seated      Shuttle           75# 2 x 15  DL calf raise, DL squat    25# SL squat, SLcalf raise x 15 25# SL calf raise 15x  75# 2x10 DL calf raise  100# DL squat 2x10-TKE focus 125#  x 15  DL calf raise, DL squat  75# deep DL squat     SLR           20x    HS stretch                  [facility]    Therapeutic Activity: (10 minutes): Activity Date:  9/5/18 Date:  9/7/18 Date:  9/10/18 Date  9/12/18 Date:  9/14/18 Date  9/17/18 Date  9/19/18 Date  9/26/18 Date  9/28/18 10/12/18   Exercise Parameters Parameters Parameters          STS No UE support 2x10;equal WB  20x L foot back for increase WB           Step overs Heel/toe pattern 20x each   Cone, step-to pattern; 10ft 8x Cone, step-to pattern; 10ft 8x    Side to side lateral 8x. Cone, step-to pattern; 10ft 8x    Side to side lateral 8x. Cone, step-to pattern with SL 3\" balance; 10ft 8x    Side to side lateral 8x. Cone, step-to pattern with SL 3\" balance; 10ft 8x  Blue foam x 10    Cone taps  2x10 for SL balance 20x for SL balance 20x for SL balance 20x slow  25 x   Multi-direction 20x    Seated heel raises  30x 20x     15x10\"  X 10   Tight rope walking   20ft 4x 30ft 4x Heel-toe 30' Heel toe 30' Heel-toe 40ft      Lunge on step   10x10\" for tibial translation 10x10\" for tibial translation      Onto blue foam   Gait training   50ft for heel/toe and hip/knee flex       focused on push off of left using PF through gastroc/soleus   Heel/toe walking    15ft         SL Balance rolling ball with R LE     Forward and back 10x (CGA by PT) Forward and back 10x (CGA by PT) Forward and back 10x (SBA by PT) Forward and back 20x (SBA by PT) Forward and back 20x (SBA by PT)    SLS      NV 30x3  10x5\" 3 x 30 sec on foam On blue foam x 2 min    Step ups       4 inch L SL       Fwd Lunge         Onto airex 30x Onto airex 30x On to blue foam     Neuromuscular Education:x 8 min to improved proprioception and ankle strategy   · Manual PF/DF resistance with isometric holds for isolated strengthening        Manual Therapy Interventions: (15 minutes): Manual interventions per grid below performed to improve joint/soft tissue mobility and ROM to improve ability to perform ADLs. Patient responded well to all manual interventions with no significant increase in pain/symptoms.  Improved ankle/foot ROM demonstrated following interventions and decrease in pain scale listed below. Manual Intervention Date:  9/5/18 Date:  9/7/18 Date  9/10/18 Date  9/12/18 9/14/18 9.17.18 9/26/18 9.28.18 10.3.18 10/8/18 10/10/18 10/12/18   Soft tissue mobilization Joint Mobility Parameters Parameters              Talocrual A/P Grade III A/P grade III A/P grade III A/P grade III A/P grade III med/lat grade III  A/P grade III med/lat grade III  A/P grade IV med/lat grade III  A/P grade IV med/lat grade III  A/P grade IV med/lat grade III  A/P grade IV med/lat grade III P/A grade IV med/lat grade III P/A grade IV med/lat grade III    Subtalar Med/lat Grade III-IV Med/lat grade III-IV Med/lst grade III-IV Med/lat grade III   Med/lat grade IV Med/lat grade IV Med/lat grade IV Med/lat grade IV Med/lat grade IV Med/lat grade IV   Plantar fascia   release release release           Gastroc/soleus  Strumming/release Strumming/release strumming     Strumming/release Strumming/release       anterior tib           Deep pressure for inhabition  Deep pressure for inhabition ; contract/relax stretching Deep pressure for inhabition ;                                      MODALITIES: (0 minutes):      vasopuematic compression for decrease inflammation/swelling and pain to improve overall ankle mobility and standing tolerance. Declined by pt today  Location= L ankle  Skin intact pre and post treatment with no adverse symptoms  Decrease swelling noted afterward and pain 0/10    Treatment/Session Assessment: pt making improvements with gastroc activation during exercises above. Needed some tactile and verbal cuing to decrease DF and toe ext during weightbearing activities. Encouraged pt increase activation of gastroc/solues during gait,  as he typically tends to avoid PF moment. Pt demonstrated good understanding of how to do this while walking in clinic with improved gait pattern.          · Pain: Post Session report:  Pain: 0/10      GOOD ·   Compliance with Program/Exercises: Compliant  · Recommendations/Intent for next treatment session:Continue with reducing tibialis anterior tone as indicated and gastroc/soleus strengthening.      Future Appointments  Date Time Provider Tay Dede   10/15/2018 8:00 AM Pablo Boss PT [facility]   10/17/2018 8:00 AM Irais [place] Trudy [facility]   10/19/2018 8:00 AM [facility] [place] [facility]   10/22/2018 8:00 AM [facility] [place] [facility]   10/24/2018 8:00 AM [facility] [place] [facility]ud[facility]   10/26/2018 8:00 AM Irais [place] Trudy [facility]       Total Treatment Duration: 45 min  PT Patient Time In/Time Out  Time In: 0800  Time Out: 291 Georgette John, PT

## 2018-10-15 ENCOUNTER — HOSPITAL ENCOUNTER (OUTPATIENT)
Dept: PHYSICAL THERAPY | Age: 48
Discharge: HOME | End: 2018-10-15
Payer: COMMERCIAL

## 2018-10-15 PROCEDURE — 97530 THERAPEUTIC ACTIVITIES: CPT

## 2018-10-15 PROCEDURE — 97113 AQUATIC THERAPY/EXERCISES: CPT

## 2018-10-15 PROCEDURE — 97140 MANUAL THERAPY 1/> REGIONS: CPT

## 2018-10-15 PROCEDURE — 97110 THERAPEUTIC EXERCISES: CPT

## 2018-10-15 NOTE — PROGRESS NOTES
Barbara Potter  : 1970      Payor: Lucy Conte / Plan: Trey Lung RPN / Product Type: Eddiel /  Tate Posada at [address]. [facility], [address] [place] [address]  [phone]   [phone]        OUTPATIENT PHYSICAL THERAPY  Daily Note, Visit # 15 :10/15/2018    ICD-10: Treatment Diagnosis:   Stiffness of left ankle, not elsewhere classified (M25.672)  Other abnormalities of gait and mobility (R26.89)  Pain in left ankle and joints of left foot (M25.572)  Effusion, left ankle (M25.472)  Precautions/Allergies:   Review of patient's allergies indicates no known allergies. Fall Risk Score: 2 (? 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 He has demonstrated slight increase in L ankle joint mobility, however it continues to be limited, as well as restrictions through achilles tendon. Gastroc/soleus demonstrate slight increase in strength but limitations in single leg stance due to decrease in ankle stability remain. Patient has returned to work as of 18 full time with restrictions (not specified by patient) as a . His job requires a lot of standing and lifting/pushing/pulling up to 30lbs.  Patient will continue to benefit from additonal skilled physical therapy 3x/wk x4wks for manual therapeutic techniques (as appropriate), therapeutic exercises and activities, neuromuscular re-education, balance training and comprehensive home exercises program to address current impairments and functional limitations to improve his functional strength with basic ADLs and work related functions. INITIAL ASSESSMENT:   Mr. Gualberto Salinas presents to physical therapy s/p Left ankle exostectomy, Left triple arthrodesis, subtalar joint malunion osteotomy, and Left Achilles lengthening  on 7/2/18. He demonstrates L ankle/foot effusion, stiffness, antalgic gait, decreased strength, ROM, joint mobility, and functional mobility limiting his ability to complete basic ADLs. These S/S are consistent with L ankle stiffness, effusion, and pain, and antalgic gait. Patient will benefit from skilled physical therapy for manual therapeutic techniques (as appropriate), therapeutic exercises and activities, neuromuscular re-education, balance training and comprehensive home exercises program to address current impairments and functional limitations. Robert Matthews will benefit from skilled PT (medically necessary) in order to address above deficits affecting participation in basic ADLs and overall functional tolerance. PROBLEM LIST (Impacting functional limitations):   1. Decreased Strength  2. Decreased Joint mobility/ROM  3. Decreased ADL/Functional Activities  4. Decreased Transfer Abilities  5. Decreased Ambulation Ability/Technique  6. Decreased Balance  7. Increased Pain  8. Decreased Activity Tolerance  9. Decreased [place] with Home Exercise Program INTERVENTIONS PLANNED:   1. Balance Exercise  2. Bed Mobility  3. Cold  4. Family Education  5. Gait Training  6. Heat  7. Home Exercise Program (HEP)  8. Manual Therapy  9. Neuromuscular Re-education/Strengthening  10. Range of Motion (ROM)  11. Therapeutic Activites  12. Therapeutic Exercise/Strengthening  13. Transfer Training  14. Ultrasound (US)   TREATMENT PLAN:  Effective Dates: 8/30/18 TO 10/29/2018 (60 days).   Frequency/Duration: 3 times a week for 60 Days  SHORT-TERM FUNCTIONAL GOALS: Time Frame: 4 weeks  Robert Matthews will be compliant with home exercise program within 4 weeks in order to improve active participation with management of patient's symptoms and/or functional deficits. Josiah Parmar will report <=3/10 pain with walking >15 minutes in order to participate in daily exercise and daily activities (met)  Josiah Parmar will be able to stand >60 minutes with <2/10 pain to feet in order to participate in household duties without issues/compromise. Josiah Parmar will report being able to sleep at night without waking up secondary to foot/ankle pain. (met)  Josiah Parmar  will improve ankle PF/DF active ROM from 32/0 to [place]  will improve ankle strength from 4/5 to 4+/5 to improve tolerance of ADLs. (met)  Josiah Parmar will be able to ascend/descend >8 steps with reciprocal pattern to demonstrate improvement in functional mobility at home. DISCHARGE GOALS: Time Frame: 8 weeks  Josiah Parmar will be independent with home exercise program within 8 weeks in order to improve independence with management of patient's symptoms and/or functional deficits. Josiah Parmar will report no pain with walking affecting participation in activities of daily living. Josiah Parmar will be able to stand >60 minutes without onset of foot pain. Josiah Parmar will be report improved score on FAAM from 34/84 to >60/84 to improve functional independence. HISTORY:     History of Present Injury/Illness (Reason for Referral): Josiah Parmar presents to physical therapy s/p L ankle/foot reconstruction approximately 8 weeks ago. He states he broke his ankle 20 years ago but never sought treatment for it. He states it healed \"crooked\" so he has been walking on the side of his foot (medial) and after a while it began to really hurt him.  He states last year he sought care for it and was placed in a boot/cast for a while to re-align his foot, however that failed so he had it surgically reconstructed and referred to PT.     -Present symptoms/complaints (on day of evaluation)   Pain Scale:  · Current: 5/10  · Best: 1/10  · Worst: 6/10    · Aggravating factors: walking long distances, going up/down steps, squatting, raising up on toes,    · Relieving factors: rest      Past Medical History/Comorbidities:   Mr. Saundra [place]  has a past medical history of Hypertension. Mr. Saundra [place]  has a past surgical history that includes hx hernia repair (1998) and hx colonoscopy., L ankle reconstruction (2018)    Active Ambulatory Problems     Diagnosis Date Noted    No Active Ambulatory Problems     Resolved Ambulatory Problems     Diagnosis Date Noted    No Resolved Ambulatory Problems     Past Medical History:   Diagnosis Date    Hypertension      Social History/Living Environment:        Social History     Social History    Marital status:      Spouse name: N/A    Number of children: N/A    Years of education: N/A     Occupational History    Not on file. Social History Main Topics    Smoking status: Never Smoker    Smokeless tobacco: Never Used    Alcohol use No    Drug use: No    Sexual activity: Not on file     Other Topics Concern    Not on file     Social History Narrative     Prior Level of Function/Work/Activity:  Not working  Hobbies: walking/travel  Dominant Side:         RIGHT  Other Clinical Tests  X-ray: healing good, per pt. Previous Treatment Approach   Corrective brace and surgery  Current Medications:    Current Outpatient Prescriptions:     losartan-hydroCHLOROthiazide (HYZAAR) 100-12.5 mg per tablet, Take 1 Tab by mouth daily. Indications: hypertension, Disp: , Rfl:      Date Last Reviewed:  10/15/2018     EXAMINATION:     Observation/Orthostatic Postural Assessment:  Assessed @ Initial Visit:      Gait Antalgic with improved toe-off in terminal stance on L, foot flat, and L hip ER    Standing; primarily WB through R LE and L in ER    Palpation:  Assessed @ Initial Visit: mild Tenderness to palpation along achilles T with restrictions noted.  Tenderness and restriction through plantar fascia and talocrual joint.  Surgical scars present good healing, however continued restricted in mobility/pliability                     AROM/PROM         Joint: Eval Date: 8/30/18  Re-Assess Date: 9/26/18  Re-Assess Date:    Active ROM RIGHT LEFT RIGHT LEFT RIGHT LEFT   Ankle Dorsiflexion 5 0  0     Ankle Plantarflexion 45 32  32     Ankle Inversion 6 0  1     Ankle Eversion 4 8  10                       Passive ROM         Ankle Dorsiflexion  1  4     Ankle Plantarflexion  32  32     Ankle Inversion  0  2     Ankle Eversion  8  10       Strength:     Eval Date: 8/30/18  Re-Assess Date: 9/26/18  Re-Assess Date:      RIGHT LEFT RIGHT LEFT RIGHT LEFT   Ankle Dorsiflexion 5/5 4/5  5/5     Ankle Plantarflexion 5/5 4+/5  4+/5     Ankle Inversion 5/5 4/5  4+/5     Ankle Eversion 5/5 4/5  4+/5     Knee Flexion 5/5 5/5       Knee Extension 5/5 5/5                  Joint Mobility Eval Date: 8/30/18  Re-Assess Date:  Re-Assess Date:     RIGHT LEFT RIGHT LEFT RIGHT LEFT   Subtalar  hypomobile  Hypomobile      Talocrual  hypomobile   hypomobile       Special Tests: Assessed @ Initial Visit:     Neurological Screen: Assessed @ Initial Visit No radiating symptoms down leg    Functional Mobility:  Assessed @ Initial Visit Limited tolerance of walking and standing  Heel/Toe walking= unable  Calf Raise (Dbl/Single)= Dbl unable  Balance:  Assessed @ Initial Visit   Single leg stance: R= 1-5seconds  L= 0 seconds     Body Structures Involved:  1. Bones  2. Joints  3. Muscles  4. Ligaments 5. Body Functions Affected:  1. Sensory/Pain  2. Neuromusculoskeletal  3. Movement Related Activities and Participation Affected:  1. Mobility  2. Self Care 3.  4.    CLINICAL DECISION MAKING:      Tool Used: FOOT AND ANKLE ABILITY MEASURE (FAAM)  Score:  Initial: 34/84 Most Recent: X (Date: -- )   Interpretation of Score:  For the \"Activities of Daily Living\", there are 21 questions each scored on a 5 point scale with 0 representing \"Unable to do\" and 4 representing \"No difficulty\". The lower the score, the greater the functional disability. 84/84 represents no disability. Minimal detectable change is 5.7 points. With the addition of the 8 questions in the \"Sports Subscale,\" there are 29 questions, each scored on a 5 point scale with 0 representing \"Unable to do\" and 4 representing \"No difficulty\". The lower the score, the greater the functional disability. 116/116 represents no disability. Minimal detectable change is 12.3 points. Activities of Daily Living:  Score 84 83-68 67-51 50-34 33-18 17-1 0   Modifier CH CI CJ CK CL CM CN     Activities of Daily Living + Sports Subscale:  Score 116 115-94 93-71 70-47 46-24 23-1 0   Modifier CH CI CJ CK CL CM CN     ? Mobility - Walking and Moving Around:     - CURRENT STATUS: CK - 40%-59% impaired, limited or restricted    - GOAL STATUS: CJ - 20%-39% impaired, limited or restricted    - D/C STATUS:  ---------------To be determined---------------      Medical Necessity:   · Patient is expected to demonstrate progress in strength, range of motion, balance, coordination and functional technique to increase independence with functional mobility at home to perform household duties and improve safety during ascending/descending stairs, walking on uneven ground, and squatting. .  Reason for Services/Other Comments:  · Patient requires manual therapeutic interventions as well as thereapeutic exercises/activities and gait training to improve patients saftey with ambulation in the community and functional mobility in and out of home. . ·  ·  ·    TREATMENT:      (In addition to Assessment/Re-Assessment sessions the following treatments were rendered)      · Pre-Treatment Pain/ Symptoms: Pt states he has been walking with focus on push off with right foot and feels he is doing great with this. No increased soreness.   0/10       THERAPEUTIC EXERCISE: (25 minutes):  Exercises per grid below to improve mobility, strength and balance. Required minimal visual and verbal cues to promote proper body alignment, promote proper body posture and promote proper body mechanics. Progressed resistance, range, repetitions and complexity of movement as indicated. Date:  9/7/18 Date  9/10/18 Date  9/12/18 Date   9/14/18 Date:  9/17/18 Date  9/19/18 Date  9/26/18 Date  9/28/18 Date  8. 3.18 10.8.18 Date  10/10/18 10/12/18 10/15/18   Exercise Parameters               Education       PRICE, HEP, re-assessment    HEP progression     Plantar fascia stretch 3x30\"               Calf stretch  3x30\" and HS stretch 3x30\" 3x30\" 3x30\" on slant board 3x30\" on slant board 3x30\" slant board 4x30\" with plantar fascia stretch 4x30\" with plantar fascia stretch   3x30\" slant board; 3x30\" bckwards for tib ant stretch     Towel curls 30x         NV      Seated calf raises  10x          X 10    baps board Seated 30x fwd/backward  30x side/side Seated 30x fwd/backward  30x side/side Standing 4 mins for balance fwd/backward Standing 2 mins for balance fwd/backward Standing 2 mins for balance fwd/backward  Med/lateral   Standing 2 mins for balance fwd/backward  Med/lateral   Standing 2x10 minimal UE support required today Seated PF x 20 with assist   Seated x 10 PF with 5 sec hold at end of range to work on endurance of gastroc    Plantar flex Green band 30x         With wobble board  Blue band 10x10\" PF hold and ecc release With baps board and manual resist  With manual resist x 5 R   Bike 10 mins level 4 10 mins  Level 7 5 minutes Level 7 5 minutes Level 6 8 min Level 7 8 mins Level 5 6 mins  nustep level 3,  8 min   nustep level 5 x 8 min nustep level 3 x 10 min    RDL Single leg    Picking up cone 13x Picking up cone 13x Picking up cone 13x  Picking up cone 15x        Wobble board         4 mins for ankle AROM in CKC, and 3 mins for balance fwd/bcwd Seated       Shuttle           75# 2 x 15  DL calf raise, DL squat    25# SL squat, SLcalf raise x 15 25# SL calf raise 15x  75# 2x10 DL calf raise  100# DL squat 2x10-TKE focus 125#  x 15  DL calf raise, DL squat  75# deep DL squat   25# calf raise and hold x 10 DL and x 10 SL    125# DL squat 2 x 15    SLR           20x     HS stretch                   Med[employer] Portal    Therapeutic Activity: (10 minutes): Activity Date:  9/7/18 Date:  9/10/18 Date  9/12/18 Date:  9/14/18 Date  9/17/18 Date  9/19/18 Date  9/26/18 Date  9/28/18 10/12/18 10/15/18   Exercise Parameters Parameters           STS  20x L foot back for increase WB            Step overs   Cone, step-to pattern; 10ft 8x Cone, step-to pattern; 10ft 8x    Side to side lateral 8x. Cone, step-to pattern; 10ft 8x    Side to side lateral 8x. Cone, step-to pattern with SL 3\" balance; 10ft 8x    Side to side lateral 8x.   Cone, step-to pattern with SL 3\" balance; 10ft 8x  Blue foam x 10  Blue foam x 10    Cone taps 2x10 for SL balance 20x for SL balance 20x for SL balance 20x slow  25 x   Multi-direction 20x     Seated heel raises 30x 20x     15x10\"  X 10    Tight rope walking  20ft 4x 30ft 4x Heel-toe 30' Heel toe 30' Heel-toe 40ft       Lunge on step  10x10\" for tibial translation 10x10\" for tibial translation      Onto blue foam    Gait training  50ft for heel/toe and hip/knee flex       focused on push off of left using PF through gastroc/soleus continued with cues for push off    Heel/toe walking   15ft          SL Balance rolling ball with R LE    Forward and back 10x (CGA by PT) Forward and back 10x (CGA by PT) Forward and back 10x (SBA by PT) Forward and back 20x (SBA by PT) Forward and back 20x (SBA by PT)     SLS     NV 30x3  10x5\" 3 x 30 sec on foam On blue foam x 2 min     Step ups      4 inch L SL        Fwd Lunge        Onto airex 30x Onto airex 30x On to blue foam      Neuromuscular Education:0 min to improved proprioception and ankle strategy   · Manual PF/DF resistance with isometric holds for isolated strengthening        Manual Therapy Interventions: (10 minutes): Manual interventions per grid below performed to improve joint/soft tissue mobility and ROM to improve ability to perform ADLs. Patient responded well to all manual interventions with no significant increase in pain/symptoms. Improved ankle/foot ROM demonstrated following interventions and decrease in pain scale listed below. Manual Intervention Date:  9/5/18 Date:  9/7/18 Date  9/10/18 Date  9/12/18 9/14/18 9.17.18 9/26/18 9.28.18 10.3.18 10/8/18 10/10/18 10/12/18 10/15/18   Soft tissue mobilization Joint Mobility Parameters Parameters               Talocrual A/P Grade III A/P grade III A/P grade III A/P grade III A/P grade III med/lat grade III  A/P grade III med/lat grade III  A/P grade IV med/lat grade III  A/P grade IV med/lat grade III  A/P grade IV med/lat grade III  A/P grade IV med/lat grade III P/A grade IV med/lat grade III P/A grade IV med/lat grade III P/A grade IV med/lat grade III    Subtalar Med/lat Grade III-IV Med/lat grade III-IV Med/lst grade III-IV Med/lat grade III   Med/lat grade IV Med/lat grade IV Med/lat grade IV Med/lat grade IV Med/lat grade IV Med/lat grade IV    Plantar fascia   release release release            Gastroc/soleus  Strumming/release Strumming/release strumming     Strumming/release Strumming/release        anterior tib           Deep pressure for inhabition  Deep pressure for inhabition ; contract/relax stretching Deep pressure for inhabition ;  Deep pressure for inhabition ;                                        MODALITIES: (0 minutes):    Treatment/Session Assessment: pt continues to be making improvements with gastroc activation during exercises above. Needed some tactile and verbal cuing to decrease DF and toe ext during weightbearing activities. Pt demonstrates improved use of gastroc during gait and had less difficulty with shuttle exercise.            · Pain: Post Session report:  Pain: 0/10      GOOD ·   Compliance with Program/Exercises: Compliant  · Recommendations/Intent for next treatment session:Continue with reducing tibialis anterior tone as indicated and gastroc/soleus strengthening.      Future Appointments  Date Time Provider Tay Aguayo   10/17/2018 8:00 AM Irais Mendiola [facility]   10/19/2018 8:00 AM Irais Marlena [facility]   10/22/2018 8:00 AM Irais Marlena Monica [facility]   10/24/2018 8:00 AM Irais Marlena Monica [facility]   10/26/2018 8:00 AM Irais Marlena Monica [facility]       Total Treatment Duration: 45 min  PT Patient Time In/Time Out  Time In: 0800  Time Out: 291 Georgette John, PT

## 2018-10-17 ENCOUNTER — HOSPITAL ENCOUNTER (OUTPATIENT)
Dept: PHYSICAL THERAPY | Age: 48
Discharge: HOME | End: 2018-10-17
Payer: COMMERCIAL

## 2018-10-17 PROCEDURE — 97530 THERAPEUTIC ACTIVITIES: CPT

## 2018-10-17 PROCEDURE — 97140 MANUAL THERAPY 1/> REGIONS: CPT

## 2018-10-17 PROCEDURE — 97110 THERAPEUTIC EXERCISES: CPT

## 2018-10-17 NOTE — PROGRESS NOTES
Mojgan Singh  : 1970      Payor: Bird Mcbride / Plan: [id number] RPN / Product Type: Commerical /  Larissa Ceroner at [address]. [facility], [address], [facility], [address] [place] [address]  [phone]   [phone]        OUTPATIENT PHYSICAL THERAPY  Daily Note, Visit # 16 :10/17/2018    ICD-10: Treatment Diagnosis:   Stiffness of left ankle, not elsewhere classified (M25.672)  Other abnormalities of gait and mobility (R26.89)  Pain in left ankle and joints of left foot (M25.572)  Effusion, left ankle (M25.472)  Precautions/Allergies:   Patient has no known allergies. Fall Risk Score: 2 (? 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 He has demonstrated slight increase in L ankle joint mobility, however it continues to be limited, as well as restrictions through achilles tendon. Gastroc/soleus demonstrate slight increase in strength but limitations in single leg stance due to decrease in ankle stability remain. Patient has returned to work as of 18 full time with restrictions (not specified by patient) as a . His job requires a lot of standing and lifting/pushing/pulling up to 30lbs.  Patient will continue to benefit from additonal skilled physical therapy 3x/wk x4wks for manual therapeutic techniques (as appropriate), therapeutic exercises and activities, neuromuscular re-education, balance training and comprehensive home exercises program to address current impairments and functional limitations to improve his functional strength with basic ADLs and work related functions. INITIAL ASSESSMENT:   Mr. Meg Boss presents to physical therapy s/p Left ankle exostectomy, Left triple arthrodesis, subtalar joint malunion osteotomy, and Left Achilles lengthening  on 7/2/18. He demonstrates L ankle/foot effusion, stiffness, antalgic gait, decreased strength, ROM, joint mobility, and functional mobility limiting his ability to complete basic ADLs. These S/S are consistent with L ankle stiffness, effusion, and pain, and antalgic gait. Patient will benefit from skilled physical therapy for manual therapeutic techniques (as appropriate), therapeutic exercises and activities, neuromuscular re-education, balance training and comprehensive home exercises program to address current impairments and functional limitations. Tamiko Mas will benefit from skilled PT (medically necessary) in order to address above deficits affecting participation in basic ADLs and overall functional tolerance. PROBLEM LIST (Impacting functional limitations):   1. Decreased Strength  2. Decreased Joint mobility/ROM  3. Decreased ADL/Functional Activities  4. Decreased Transfer Abilities  5. Decreased Ambulation Ability/Technique  6. Decreased Balance  7. Increased Pain  8. Decreased Activity Tolerance  9. Decreased Verdi with Home Exercise Program INTERVENTIONS PLANNED:   1. Balance Exercise  2. Bed Mobility  3. Cold  4. Family Education  5. Gait Training  6. Heat  7. Home Exercise Program (HEP)  8. Manual Therapy  9. Neuromuscular Re-education/Strengthening  10. Range of Motion (ROM)  11. Therapeutic Activites  12. Therapeutic Exercise/Strengthening  13. Transfer Training  14. Ultrasound (US)   TREATMENT PLAN:  Effective Dates: 8/30/18 TO 10/29/2018 (60 days).   Frequency/Duration: 3 times a week for 60 Days  SHORT-TERM FUNCTIONAL GOALS: Time Frame: 4 weeks  Tamiko Mas will be compliant with home exercise program within 4 weeks in order to improve active participation with management of patient's symptoms and/or functional deficits. Attila Haddad will report <=3/10 pain with walking >15 minutes in order to participate in daily exercise and daily activities (met)  Attila Haddad will be able to stand >60 minutes with <2/10 pain to feet in order to participate in household duties without issues/compromise. Attila Haddad will report being able to sleep at night without waking up secondary to foot/ankle pain. (met)  Attila Haddad  will improve ankle PF/DF active ROM from 32/0 to [place]  will improve ankle strength from 4/5 to 4+/5 to improve tolerance of ADLs. (met)  Attila Haddad will be able to ascend/descend >8 steps with reciprocal pattern to demonstrate improvement in functional mobility at home. DISCHARGE GOALS: Time Frame: 8 weeks  Attila Haddad will be independent with home exercise program within 8 weeks in order to improve independence with management of patient's symptoms and/or functional deficits. Attila Haddad will report no pain with walking affecting participation in activities of daily living. Attila Haddad will be able to stand >60 minutes without onset of foot pain. Attila Haddad will be report improved score on FAAM from 34/84 to >60/84 to improve functional independence. HISTORY:     History of Present Injury/Illness (Reason for Referral): Attila Haddad presents to physical therapy s/p L ankle/foot reconstruction approximately 8 weeks ago. He states he broke his ankle 20 years ago but never sought treatment for it. He states it healed \"crooked\" so he has been walking on the side of his foot (medial) and after a while it began to really hurt him.  He states last year he sought care for it and was placed in a boot/cast for a while to re-align his foot, however that failed so he had it surgically reconstructed and referred to PT.     -Present symptoms/complaints (on day of evaluation)   Pain Scale:  · Current: 5/10  · Best: 1/10  · Worst: 6/10    · Aggravating factors: walking long distances, going up/down steps, squatting, raising up on toes,    · Relieving factors: rest      Past Medical History/Comorbidities:   Mr. Urszula Barraza  has a past medical history of Hypertension. Mr. Urszula Barraza  has a past surgical history that includes hx hernia repair (1998); hx colonoscopy; LEFT ANKLE EXOSTECTOMY (Left, 7/2/2018); LEFT ANKLE ARTHRODESIS TRIPLE (Left, 7/2/2018); and LEFT ACHILLES LENGTHENING (Left, 7/2/2018). , L ankle reconstruction (2018)    Active Ambulatory Problems     Diagnosis Date Noted    No Active Ambulatory Problems     Resolved Ambulatory Problems     Diagnosis Date Noted    No Resolved Ambulatory Problems     Past Medical History:   Diagnosis Date    Hypertension      Social History/Living Environment:        Social History     Socioeconomic History    Marital status:      Spouse name: Not on file    Number of children: Not on file    Years of education: Not on file    Highest education level: Not on file   Social Needs    Financial resource strain: Not on file    Food insecurity - worry: Not on file    Food insecurity - inability: Not on file   [employer] needs - medical: Not on file   [employer] needs - non-medical: Not on file   Occupational History    Not on file   Tobacco Use    Smoking status: Never Smoker    Smokeless tobacco: Never Used   Substance and Sexual Activity    Alcohol use: No    Drug use: No    Sexual activity: Not on file   Other Topics Concern    Not on file   Social History Narrative    Not on file     Prior Level of Function/Work/Activity:  Not working  Hobbies: walking/travel  Dominant Side:         RIGHT  Other Clinical Tests  X-ray: healing good, per pt. Previous Treatment Approach   Corrective brace and surgery  Current Medications:    Current Outpatient Medications:     losartan-hydroCHLOROthiazide (HYZAAR) 100-12.5 mg per tablet, Take 1 Tab by mouth daily.  Indications: hypertension, Disp: , Rfl:      Date Last Reviewed:  10/17/2018     EXAMINATION:     Observation/Orthostatic Postural Assessment:  Assessed @ Initial Visit:      Gait Antalgic with improved toe-off in terminal stance on L, foot flat, and L hip ER    Standing; primarily WB through R LE and L in ER    Palpation:  Assessed @ Initial Visit: mild Tenderness to palpation along achilles T with restrictions noted. Tenderness and restriction through plantar fascia and talocrual joint. Surgical scars present good healing, however continued restricted in mobility/pliability                     AROM/PROM         Joint: Eval Date: 8/30/18  Re-Assess Date: 9/26/18  Re-Assess Date:    Active ROM RIGHT LEFT RIGHT LEFT RIGHT LEFT   Ankle Dorsiflexion 5 0  0     Ankle Plantarflexion 45 32  32     Ankle Inversion 6 0  1     Ankle Eversion 4 8  10                       Passive ROM         Ankle Dorsiflexion  1  4     Ankle Plantarflexion  32  32     Ankle Inversion  0  2     Ankle Eversion  8  10       Strength:     Eval Date: 8/30/18  Re-Assess Date: 9/26/18  Re-Assess Date:      RIGHT LEFT RIGHT LEFT RIGHT LEFT   Ankle Dorsiflexion 5/5 4/5  5/5     Ankle Plantarflexion 5/5 4+/5  4+/5     Ankle Inversion 5/5 4/5  4+/5     Ankle Eversion 5/5 4/5  4+/5     Knee Flexion 5/5 5/5       Knee Extension 5/5 5/5                  Joint Mobility Eval Date: 8/30/18  Re-Assess Date:  Re-Assess Date:     RIGHT LEFT RIGHT LEFT RIGHT LEFT   Subtalar  hypomobile  Hypomobile      Talocrual  hypomobile   hypomobile       Special Tests: Assessed @ Initial Visit:     Neurological Screen: Assessed @ Initial Visit No radiating symptoms down leg    Functional Mobility:  Assessed @ Initial Visit Limited tolerance of walking and standing  Heel/Toe walking= unable  Calf Raise (Dbl/Single)= Dbl unable  Balance:  Assessed @ Initial Visit   Single leg stance: R= 1-5seconds  L= 0 seconds     Body Structures Involved:  1. Bones  2. Joints  3. Muscles  4. Ligaments 5.   Body Functions Affected:  1. Sensory/Pain  2. Neuromusculoskeletal  3. Movement Related Activities and Participation Affected:  1. Mobility  2. Self Care 3.  4.    CLINICAL DECISION MAKING:      Tool Used: FOOT AND ANKLE ABILITY MEASURE (FAAM)  Score:  Initial: 34/84 Most Recent: X (Date: -- )   Interpretation of Score: For the \"Activities of Daily Living\", there are 21 questions each scored on a 5 point scale with 0 representing \"Unable to do\" and 4 representing \"No difficulty\". The lower the score, the greater the functional disability. 84/84 represents no disability. Minimal detectable change is 5.7 points. With the addition of the 8 questions in the \"Sports Subscale,\" there are 29 questions, each scored on a 5 point scale with 0 representing \"Unable to do\" and 4 representing \"No difficulty\". The lower the score, the greater the functional disability. 116/116 represents no disability. Minimal detectable change is 12.3 points. Activities of Daily Living:  Score 84 83-68 67-51 50-34 33-18 17-1 0   Modifier CH CI CJ CK CL CM CN     Activities of Daily Living + Sports Subscale:  Score 116 115-94 93-71 70-47 46-24 23-1 0   Modifier CH CI CJ CK CL CM CN     ? Mobility - Walking and Moving Around:     - CURRENT STATUS: CK - 40%-59% impaired, limited or restricted    - GOAL STATUS: CJ - 20%-39% impaired, limited or restricted    - D/C STATUS:  ---------------To be determined---------------      Medical Necessity:   · Patient is expected to demonstrate progress in strength, range of motion, balance, coordination and functional technique to increase independence with functional mobility at home to perform household duties and improve safety during ascending/descending stairs, walking on uneven ground, and squatting. .  Reason for Services/Other Comments:  · Patient requires manual therapeutic interventions as well as thereapeutic exercises/activities and gait training to improve patients saftey with ambulation in the community and functional mobility in and out of home. . ·  ·  ·    TREATMENT:      (In addition to Assessment/Re-Assessment sessions the following treatments were rendered)      · Pre-Treatment Pain/ Symptoms: Pt states doing great no pain. 0/10       THERAPEUTIC EXERCISE: (25 minutes):  Exercises per grid below to improve mobility, strength and balance. Required minimal visual and verbal cues to promote proper body alignment, promote proper body posture and promote proper body mechanics. Progressed resistance, range, repetitions and complexity of movement as indicated. Date:  9/7/18 Date  9/10/18 Date  9/12/18 Date   9/14/18 Date:  9/17/18 Date  9/19/18 Date  9/26/18 Date  9/28/18 Date  8. 3.18 10.8.18 Date  10/10/18 10/12/18 10/15/18 10/17/18   Exercise Parameters                Education       PRICE, HEP, re-assessment    HEP progression      Plantar fascia stretch 3x30\"                Calf stretch  3x30\" and HS stretch 3x30\" 3x30\" 3x30\" on slant board 3x30\" on slant board 3x30\" slant board 4x30\" with plantar fascia stretch 4x30\" with plantar fascia stretch   3x30\" slant board; 3x30\" bckwards for tib ant stretch   3x30\" slant board; 3x30\" bckwards for tib ant stretch   Towel curls 30x         NV       Seated calf raises  10x          X 10     baps board Seated 30x fwd/backward  30x side/side Seated 30x fwd/backward  30x side/side Standing 4 mins for balance fwd/backward Standing 2 mins for balance fwd/backward Standing 2 mins for balance fwd/backward  Med/lateral   Standing 2 mins for balance fwd/backward  Med/lateral   Standing 2x10 minimal UE support required today Seated PF x 20 with assist   Seated x 10 PF with 5 sec hold at end of range to work on endurance of gastroc     Plantar flex Green band 30x         With wobble board  Blue band 10x10\" PF hold and ecc release With baps board and manual resist  With manual resist x 5 R With manual resist x 5 R   Bike 10 mins level 4 10 mins Level 7 5 minutes Level 7 5 minutes Level 6 8 min Level 7 8 mins Level 5 6 mins  nustep level 3,  8 min   nustep level 5 x 8 min nustep level 3 x 10 min  nustep level 6 10 mins   RDL Single leg    Picking up cone 13x Picking up cone 13x Picking up cone 13x  Picking up cone 15x         Wobble board         4 mins for ankle AROM in CKC, and 3 mins for balance fwd/bcwd Seated        Shuttle           75# 2 x 15  DL calf raise, DL squat    25# SL squat, SLcalf raise x 15 25# SL calf raise 15x  75# 2x10 DL calf raise  100# DL squat 2x10-TKE focus 125#  x 15  DL calf raise, DL squat  75# deep DL squat   25# calf raise and hold x 10 DL and x 10 SL    125# DL squat 2 x 15  50# calf raise and hold DL 30x and 25# 10x SL    125#DL squat 3x10   SLR           20x      HS stretch                    [employer] Portal    Therapeutic Activity: (10 minutes): Activity Date:  9/7/18 Date:  9/10/18 Date  9/12/18 Date:  9/14/18 Date  9/17/18 Date  9/19/18 Date  9/26/18 Date  9/28/18 10/12/18 10/15/18 10/17/18   Exercise Parameters Parameters            STS  20x L foot back for increase WB             Step overs   Cone, step-to pattern; 10ft 8x Cone, step-to pattern; 10ft 8x    Side to side lateral 8x. Cone, step-to pattern; 10ft 8x    Side to side lateral 8x. Cone, step-to pattern with SL 3\" balance; 10ft 8x    Side to side lateral 8x.   Cone, step-to pattern with SL 3\" balance; 10ft 8x  Blue foam x 10  Blue foam x 10  Blue foam x10   Cone taps 2x10 for SL balance 20x for SL balance 20x for SL balance 20x slow  25 x   Multi-direction 20x      Seated heel raises 30x 20x     15x10\"  X 10     Tight rope walking  20ft 4x 30ft 4x Heel-toe 30' Heel toe 30' Heel-toe 40ft        Lunge on step  10x10\" for tibial translation 10x10\" for tibial translation      Onto blue foam     Gait training  50ft for heel/toe and hip/knee flex       focused on push off of left using PF through gastroc/soleus continued with cues for push off  continued with cues for push off    Heel/toe walking   15ft           SL Balance rolling ball with R LE    Forward and back 10x (CGA by PT) Forward and back 10x (CGA by PT) Forward and back 10x (SBA by PT) Forward and back 20x (SBA by PT) Forward and back 20x (SBA by PT)      SLS     NV 30x3  10x5\" 3 x 30 sec on foam On blue foam x 2 min   On blue foam x 3 min with R foot on ball for support as needed   Step ups      4 inch L SL         Fwd Lunge        Onto airex 30x Onto airex 30x On to blue foam       Neuromuscular Education:0 min to improved proprioception and ankle strategy   · Manual PF/DF resistance with isometric holds for isolated strengthening        Manual Therapy Interventions: (10 minutes): Manual interventions per grid below performed to improve joint/soft tissue mobility and ROM to improve ability to perform ADLs. Patient responded well to all manual interventions with no significant increase in pain/symptoms. Improved ankle/foot ROM demonstrated following interventions and decrease in pain scale listed below.                Manual Intervention Date:  9/5/18 Date:  9/7/18 Date  9/10/18 Date  9/12/18 9/14/18 9.17.18 9/26/18 9.28.18 10.3.18 10/8/18 10/10/18 10/12/18 10/15/18 10/17/18   Soft tissue mobilization Joint Mobility Parameters Parameters                Talocrual A/P Grade III A/P grade III A/P grade III A/P grade III A/P grade III med/lat grade III  A/P grade III med/lat grade III  A/P grade IV med/lat grade III  A/P grade IV med/lat grade III  A/P grade IV med/lat grade III  A/P grade IV med/lat grade III P/A grade IV med/lat grade III P/A grade IV med/lat grade III P/A grade IV med/lat grade III P/A grade IV med/lat grade III    Subtalar Med/lat Grade III-IV Med/lat grade III-IV Med/lst grade III-IV Med/lat grade III   Med/lat grade IV Med/lat grade IV Med/lat grade IV Med/lat grade IV Med/lat grade IV Med/lat grade IV     Plantar fascia   release release release             Gastroc/soleus Strumming/release Strumming/release strumming     Strumming/release Strumming/release         anterior tib           Deep pressure for inhabition  Deep pressure for inhabition ; contract/relax stretching Deep pressure for inhabition ;  Deep pressure for inhabition ;  Deep pressure for inhabition                                         MODALITIES: (0 minutes):    Treatment/Session Assessment: Improved gastroc activation on shuttle with calf raises today and with gait. Improved single leg balance on airex with minimal help by small support with L foot on ball. Pt continues to have a lot of tightness through tibialis anterior. · Pain: Post Session report:  Pain: 0/10      GOOD ·   Compliance with Program/Exercises: Compliant  · Recommendations/Intent for next treatment session:Continue with reducing tibialis anterior tone as indicated and gastroc/soleus strengthening.      Future Appointments   Date Time Provider Tay Aguayo   10/19/2018  8:00 AM Sony [facility]   10/22/2018  8:00 AM [facility]   10/24/2018  8:00 AM Sony [facility]ONA [facility]   10/26/2018  8[facility]       Total Treatment Duration: 45 min  PT Patient Time In/Time Out  Time In: 0805  Time Out: 4174    Rossy Genao

## 2018-10-19 ENCOUNTER — HOSPITAL ENCOUNTER (OUTPATIENT)
Dept: PHYSICAL THERAPY | Age: 48
Discharge: HOME | End: 2018-10-19
Payer: COMMERCIAL

## 2018-10-19 PROCEDURE — 97530 THERAPEUTIC ACTIVITIES: CPT

## 2018-10-19 PROCEDURE — 97110 THERAPEUTIC EXERCISES: CPT

## 2018-10-19 PROCEDURE — 97140 MANUAL THERAPY 1/> REGIONS: CPT

## 2018-10-19 NOTE — PROGRESS NOTES
Rhiannon Pacheco  : 1970      Payor: Timothy Ramsey / Plan: Authur Scheuermann RPN / Product Type: Commerical /  Huyen Delay at [address]. [facility], [address] [place] [address], [address] [place] [address]  [phone]   [phone]        OUTPATIENT PHYSICAL THERAPY  Daily Note, Visit # 17 :10/19/2018    ICD-10: Treatment Diagnosis:   Stiffness of left ankle, not elsewhere classified (M25.672)  Other abnormalities of gait and mobility (R26.89)  Pain in left ankle and joints of left foot (M25.572)  Effusion, left ankle (M25.472)  Precautions/Allergies:   Patient has no known allergies. Fall Risk Score: 2 (? 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 He has demonstrated slight increase in L ankle joint mobility, however it continues to be limited, as well as restrictions through achilles tendon. Gastroc/soleus demonstrate slight increase in strength but limitations in single leg stance due to decrease in ankle stability remain. Patient has returned to work as of 18 full time with restrictions (not specified by patient) as a . His job requires a lot of standing and lifting/pushing/pulling up to 30lbs.  Patient will continue to benefit from additonal skilled physical therapy 3x/wk x4wks for manual therapeutic techniques (as appropriate), therapeutic exercises and activities, neuromuscular re-education, balance training and comprehensive home exercises program to address current impairments and functional limitations to improve his functional strength with basic ADLs and work related functions. INITIAL ASSESSMENT:   Mr. Glenn Bucio presents to physical therapy s/p Left ankle exostectomy, Left triple arthrodesis, subtalar joint malunion osteotomy, and Left Achilles lengthening  on 7/2/18. He demonstrates L ankle/foot effusion, stiffness, antalgic gait, decreased strength, ROM, joint mobility, and functional mobility limiting his ability to complete basic ADLs. These S/S are consistent with L ankle stiffness, effusion, and pain, and antalgic gait. Patient will benefit from skilled physical therapy for manual therapeutic techniques (as appropriate), therapeutic exercises and activities, neuromuscular re-education, balance training and comprehensive home exercises program to address current impairments and functional limitations. Geovanny Horan will benefit from skilled PT (medically necessary) in order to address above deficits affecting participation in basic ADLs and overall functional tolerance. PROBLEM LIST (Impacting functional limitations):   1. Decreased Strength  2. Decreased Joint mobility/ROM  3. Decreased ADL/Functional Activities  4. Decreased Transfer Abilities  5. Decreased Ambulation Ability/Technique  6. Decreased Balance  7. Increased Pain  8. Decreased Activity Tolerance  9. Decreased [place] with Home Exercise Program INTERVENTIONS PLANNED:   1. Balance Exercise  2. Bed Mobility  3. Cold  4. Family Education  5. Gait Training  6. Heat  7. Home Exercise Program (HEP)  8. Manual Therapy  9. Neuromuscular Re-education/Strengthening  10. Range of Motion (ROM)  11. Therapeutic Activites  12. Therapeutic Exercise/Strengthening  13. Transfer Training  14. Ultrasound (US)   TREATMENT PLAN:  Effective Dates: 8/30/18 TO 10/29/2018 (60 days).   Frequency/Duration: 3 times a week for 60 Days  SHORT-TERM FUNCTIONAL GOALS: Time Frame: 4 weeks  Geovanny Horan will be compliant with home exercise program within 4 weeks in order to improve active participation with management of patient's symptoms and/or functional deficits. Robert Matthews will report <=3/10 pain with walking >15 minutes in order to participate in daily exercise and daily activities (met)  Robert Matthews will be able to stand >60 minutes with <2/10 pain to feet in order to participate in household duties without issues/compromise. Robert Matthews will report being able to sleep at night without waking up secondary to foot/ankle pain. (met)  Robert Matthews  will improve ankle PF/DF active ROM from 32/0 to [place]  will improve ankle strength from 4/5 to 4+/5 to improve tolerance of ADLs. (met)  Robert Matthews will be able to ascend/descend >8 steps with reciprocal pattern to demonstrate improvement in functional mobility at home. DISCHARGE GOALS: Time Frame: 8 weeks  Robert Matthews will be independent with home exercise program within 8 weeks in order to improve independence with management of patient's symptoms and/or functional deficits. Robert Matthews will report no pain with walking affecting participation in activities of daily living. Robert Matthews will be able to stand >60 minutes without onset of foot pain. Robert Matthews will be report improved score on FAAM from 34/84 to >60/84 to improve functional independence. HISTORY:        History of Present Injury/Illness (Reason for Referral): Robert Matthews presents to physical therapy s/p L ankle/foot reconstruction approximately 8 weeks ago. He states he broke his ankle 20 years ago but never sought treatment for it. He states it healed \"crooked\" so he has been walking on the side of his foot (medial) and after a while it began to really hurt him.  He states last year he sought care for it and was placed in a boot/cast for a while to re-align his foot, however that failed so he had it surgically reconstructed and referred to PT.     -Present symptoms/complaints (on day of evaluation)   Pain Scale:  · Current: 5/10  · Best: 1/10  · Worst: 6/10    · Aggravating factors: walking long distances, going up/down steps, squatting, raising up on toes,    · Relieving factors: rest      Past Medical History/Comorbidities:   Mr. Meg Boss  has a past medical history of Hypertension. Mr. Meg Boss  has a past surgical history that includes hx hernia repair (1998); hx colonoscopy; LEFT ANKLE EXOSTECTOMY (Left, 7/2/2018); LEFT ANKLE ARTHRODESIS TRIPLE (Left, 7/2/2018); and LEFT ACHILLES LENGTHENING (Left, 7/2/2018). , L ankle reconstruction (2018)    Active Ambulatory Problems     Diagnosis Date Noted    No Active Ambulatory Problems     Resolved Ambulatory Problems     Diagnosis Date Noted    No Resolved Ambulatory Problems     Past Medical History:   Diagnosis Date    Hypertension      Social History/Living Environment:        Social History     Socioeconomic History    Marital status:      Spouse name: Not on file    Number of children: Not on file    Years of education: Not on file    Highest education level: Not on file   Social Needs    Financial resource strain: Not on file    Food insecurity - worry: Not on file    Food insecurity - inability: Not on file   [employer] needs - medical: Not on file   [employer] needs - non-medical: Not on file   Occupational History    Not on file   Tobacco Use    Smoking status: Never Smoker    Smokeless tobacco: Never Used   Substance and Sexual Activity    Alcohol use: No    Drug use: No    Sexual activity: Not on file   Other Topics Concern    Not on file   Social History Narrative    Not on file     Prior Level of Function/Work/Activity:  Not working  Hobbies: walking/travel  Dominant Side:         RIGHT  Other Clinical Tests  X-ray: healing good, per pt. Previous Treatment Approach   Corrective brace and surgery  Current Medications:    Current Outpatient Medications:     losartan-hydroCHLOROthiazide (HYZAAR) 100-12.5 mg per tablet, Take 1 Tab by mouth daily.  Indications: hypertension, Disp: , Rfl:      Date Last Reviewed:  10/19/2018        EXAMINATION:        Observation/Orthostatic Postural Assessment:  Assessed @ Initial Visit:      Gait Antalgic with improved toe-off in terminal stance on L, foot flat, and L hip ER    Standing; primarily WB through R LE and L in ER    Palpation:  Assessed @ Initial Visit: mild Tenderness to palpation along achilles T with restrictions noted. Tenderness and restriction through plantar fascia and talocrual joint. Surgical scars present good healing, however continued restricted in mobility/pliability                     AROM/PROM         Joint: Eval Date: 8/30/18  Re-Assess Date: 9/26/18  Re-Assess Date:    Active ROM RIGHT LEFT RIGHT LEFT RIGHT LEFT   Ankle Dorsiflexion 5 0  0     Ankle Plantarflexion 45 32  32     Ankle Inversion 6 0  1     Ankle Eversion 4 8  10                       Passive ROM         Ankle Dorsiflexion  1  4     Ankle Plantarflexion  32  32     Ankle Inversion  0  2     Ankle Eversion  8  10       Strength:     Eval Date: 8/30/18  Re-Assess Date: 9/26/18  Re-Assess Date:      RIGHT LEFT RIGHT LEFT RIGHT LEFT   Ankle Dorsiflexion 5/5 4/5  5/5     Ankle Plantarflexion 5/5 4+/5  4+/5     Ankle Inversion 5/5 4/5  4+/5     Ankle Eversion 5/5 4/5  4+/5     Knee Flexion 5/5 5/5       Knee Extension 5/5 5/5                  Joint Mobility Eval Date: 8/30/18  Re-Assess Date:  Re-Assess Date:     RIGHT LEFT RIGHT LEFT RIGHT LEFT   Subtalar  hypomobile  Hypomobile      Talocrual  hypomobile   hypomobile       Special Tests: Assessed @ Initial Visit:     Neurological Screen: Assessed @ Initial Visit No radiating symptoms down leg    Functional Mobility:  Assessed @ Initial Visit Limited tolerance of walking and standing  Heel/Toe walking= unable  Calf Raise (Dbl/Single)= Dbl unable  Balance:  Assessed @ Initial Visit   Single leg stance: R= 1-5seconds  L= 0 seconds        Body Structures Involved:  1. Bones  2. Joints  3. Muscles  4. Ligaments 5. Body Functions Affected:  1. Sensory/Pain  2. Neuromusculoskeletal  3. Movement Related Activities and Participation Affected:  1. Mobility  2. Self Care 3.  4.  5.  6.  7.    CLINICAL DECISION MAKING:         Tool Used: FOOT AND ANKLE ABILITY MEASURE (FAAM)  Score:  Initial: 34/84 Most Recent: X (Date: -- )   Interpretation of Score: For the \"Activities of Daily Living\", there are 21 questions each scored on a 5 point scale with 0 representing \"Unable to do\" and 4 representing \"No difficulty\". The lower the score, the greater the functional disability. 84/84 represents no disability. Minimal detectable change is 5.7 points. With the addition of the 8 questions in the \"Sports Subscale,\" there are 29 questions, each scored on a 5 point scale with 0 representing \"Unable to do\" and 4 representing \"No difficulty\". The lower the score, the greater the functional disability. 116/116 represents no disability. Minimal detectable change is 12.3 points. Activities of Daily Living:  Score 84 83-68 67-51 50-34 33-18 17-1 0   Modifier CH CI CJ CK CL CM CN     Activities of Daily Living + Sports Subscale:  Score 116 115-94 93-71 70-47 46-24 23-1 0   Modifier CH CI CJ CK CL CM CN     ? Mobility - Walking and Moving Around:     - CURRENT STATUS: CK - 40%-59% impaired, limited or restricted    - GOAL STATUS: CJ - 20%-39% impaired, limited or restricted    - D/C STATUS:  ---------------To be determined---------------      Medical Necessity:   · Patient is expected to demonstrate progress in strength, range of motion, balance, coordination and functional technique to increase independence with functional mobility at home to perform household duties and improve safety during ascending/descending stairs, walking on uneven ground, and squatting. .  Reason for Services/Other Comments:  · Patient requires manual therapeutic interventions as well as thereapeutic exercises/activities and gait training to improve patients saftey with ambulation in the community and functional mobility in and out of home. . ·  ·  ·  ·  ·  ·    TREATMENT:         (In addition to Assessment/Re-Assessment sessions the following treatments were rendered)      · Pre-Treatment Pain/ Symptoms: Pt states doing great no pain. 0/10       THERAPEUTIC EXERCISE: (12 minutes):  Exercises per grid below to improve mobility, strength and balance. Required minimal visual and verbal cues to promote proper body alignment, promote proper body posture and promote proper body mechanics. Progressed resistance, range, repetitions and complexity of movement as indicated. Date:  9/7/18 Date  9/10/18 Date  9/12/18 Date   9/14/18 Date:  9/17/18 Date  9/19/18 Date  9/26/18 Date  9/28/18 Date  8. 3.18 10.8.18 Date  10/10/18 10/12/18 10/15/18 10/17/18 10/19/18   Exercise Parameters                 Education       PRICE, HEP, re-assessment    HEP progression       Plantar fascia stretch 3x30\"                 Calf stretch  3x30\" and HS stretch 3x30\" 3x30\" 3x30\" on slant board 3x30\" on slant board 3x30\" slant board 4x30\" with plantar fascia stretch 4x30\" with plantar fascia stretch   3x30\" slant board; 3x30\" bckwards for tib ant stretch   3x30\" slant board; 3x30\" bckwards for tib ant stretch 3x30\" slant board; 3x30\" bckwards for tib ant stretch   Towel curls 30x         NV        Seated calf raises  10x          X 10      baps board Seated 30x fwd/backward  30x side/side Seated 30x fwd/backward  30x side/side Standing 4 mins for balance fwd/backward Standing 2 mins for balance fwd/backward Standing 2 mins for balance fwd/backward  Med/lateral   Standing 2 mins for balance fwd/backward  Med/lateral   Standing 2x10 minimal UE support required today Seated PF x 20 with assist   Seated x 10 PF with 5 sec hold at end of range to work on endurance of gastroc      Plantar flex Green band 30x         With wobble board  Blue band 10x10\" PF hold and ecc release With baps board and manual resist  With manual resist x 5 R With manual resist x 5 R With blue band 10x ecc focus   Bike 10 mins level 4 10 mins  Level 7 5 minutes Level 7 5 minutes Level 6 8 min Level 7 8 mins Level 5 6 mins  nustep level 3,  8 min   nustep level 5 x 8 min nustep level 3 x 10 min  nustep level 6 10 mins nustep level 7 8 mins   RDL Single leg    Picking up cone 13x Picking up cone 13x Picking up cone 13x  Picking up cone 15x          Wobble board         4 mins for ankle AROM in CKC, and 3 mins for balance fwd/bcwd Seated         Shuttle           75# 2 x 15  DL calf raise, DL squat    25# SL squat, SLcalf raise x 15 25# SL calf raise 15x  75# 2x10 DL calf raise  100# DL squat 2x10-TKE focus 125#  x 15  DL calf raise, DL squat  75# deep DL squat   25# calf raise and hold x 10 DL and x 10 SL    125# DL squat 2 x 15  50# calf raise and hold DL 30x and 25# 10x SL    125#DL squat 3x10    SLR           20x       HS stretch                     Med[employer] Portal    Therapeutic Activity: (20 minutes): Activity Date:  9/7/18 Date:  9/10/18 Date  9/12/18 Date:  9/14/18 Date  9/17/18 Date  9/19/18 Date  9/26/18 Date  9/28/18 10/12/18 10/15/18 10/17/18 10/19/18   Exercise Parameters Parameters             STS  20x L foot back for increase WB              Step overs   Cone, step-to pattern; 10ft 8x Cone, step-to pattern; 10ft 8x    Side to side lateral 8x. Cone, step-to pattern; 10ft 8x    Side to side lateral 8x. Cone, step-to pattern with SL 3\" balance; 10ft 8x    Side to side lateral 8x.   Cone, step-to pattern with SL 3\" balance; 10ft 8x  Blue foam x 10  Blue foam x 10  Blue foam x10    Cone taps 2x10 for SL balance 20x for SL balance 20x for SL balance 20x slow  25 x   Multi-direction 20x       Seated heel raises 30x 20x     15x10\"  X 10      Tight rope walking  20ft 4x 30ft 4x Heel-toe 30' Heel toe 30' Heel-toe 40ft         Lunge on step  10x10\" for tibial translation 10x10\" for tibial translation      Onto blue foam 10x10\" for tibial glide in CKC position to assist DF   Gait training  50ft for heel/toe and hip/knee flex       focused on push off of left using PF through gastroc/soleus continued with cues for push off  continued with cues for push off     Heel/toe walking   15ft            SL Balance rolling ball with R LE    Forward and back 10x (CGA by PT) Forward and back 10x (CGA by PT) Forward and back 10x (SBA by PT) Forward and back 20x (SBA by PT) Forward and back 20x (SBA by PT)       SLS     NV 30x3  10x5\" 3 x 30 sec on foam On blue foam x 2 min   On blue foam x 3 min with R foot on ball for support as needed On blue foam x 4 min with R foot on ball rolling fwd/bckwd   Step ups      4 inch L SL          Fwd Lunge        Onto airex 30x Onto airex 30x On to blue foam      Calf raises            Off steps with heels off for stretch 20x                                   Neuromuscular Education:0 min to improved proprioception and ankle strategy   · Manual PF/DF resistance with isometric holds for isolated strengthening        Manual Therapy Interventions: (12 minutes): Manual interventions per grid below performed to improve joint/soft tissue mobility and ROM to improve ability to perform ADLs. Patient responded well to all manual interventions with no significant increase in pain/symptoms. Improved ankle/foot ROM demonstrated following interventions and decrease in pain scale listed below.                Manual Intervention Date:  9/5/18 Date:  9/7/18 Date  9/10/18 Date  9/12/18 9/14/18 9.17.18 9/26/18 9.28.18 10.3.18 10/8/18 10/10/18 10/12/18 10/15/18 10/17/18 10/19/18   Soft tissue mobilization Joint Mobility Parameters Parameters                 Talocrual A/P Grade III A/P grade III A/P grade III A/P grade III A/P grade III med/lat grade III  A/P grade III med/lat grade III  A/P grade IV med/lat grade III  A/P grade IV med/lat grade III  A/P grade IV med/lat grade III  A/P grade IV med/lat grade III P/A grade IV med/lat grade III P/A grade IV med/lat grade III P/A grade IV med/lat grade III P/A grade IV med/lat grade III P/A grade IV med/lat grade III    Subtalar Med/lat Grade III-IV Med/lat grade III-IV Med/lst grade III-IV Med/lat grade III   Med/lat grade IV Med/lat grade IV Med/lat grade IV Med/lat grade IV Med/lat grade IV Med/lat grade IV      Plantar fascia   release release release              Gastroc/soleus  Strumming/release Strumming/release strumming     Strumming/release Strumming/release          anterior tib           Deep pressure for inhabition  Deep pressure for inhabition ; contract/relax stretching Deep pressure for inhabition ;  Deep pressure for inhabition ;  Deep pressure for inhabition Deep pressure for inhabition and strumming                                           MODALITIES: (0 minutes):    Treatment/Session Assessment: Improved single leg balance today demonstrated on airex; able to maintain for 1 min before required UE support for re-adjusting balance. Good tolerance to calf raise off step for functional strengthening and stretching. Lunge on step added to HEP for at home joint mobilization of ankle for improving ROM. · Pain: Post Session report:  Pain: 0/10      GOOD ·   Compliance with Program/Exercises: Compliant  · Recommendations/Intent for next treatment session:Continue with reducing tibialis anterior tone as indicated and gastroc/soleus strengthening.      Future Appointments   Date Time Provider Tay Aguayo   10/22/2018  8:00 AM Kalyan Franco [facility]   10/24/2018  8:00 AM Jocelyn LAURENTRPNORAH [facility]   10/26/2018  8:00 AM Kalyan Franco [facility]       Total Treatment Duration: 44 min  PT Patient Time In/Time Out  Time In: 0805  Time Out: 7385    Keven Andre

## 2018-10-22 ENCOUNTER — HOSPITAL ENCOUNTER (OUTPATIENT)
Dept: PHYSICAL THERAPY | Age: 48
Discharge: HOME | End: 2018-10-22
Payer: COMMERCIAL

## 2018-10-22 PROCEDURE — 97140 MANUAL THERAPY 1/> REGIONS: CPT

## 2018-10-22 PROCEDURE — 97530 THERAPEUTIC ACTIVITIES: CPT

## 2018-10-22 NOTE — PROGRESS NOTES
Dana Car  : 1970      Payor: Ansley Campoverde / Plan: Stella Sotomayor RPN / Product Type: Commerical /  Malik Sandoval at [facility]. [facility], [address], [facility], [address] [place] [address]  [phone]   [phone]        OUTPATIENT PHYSICAL THERAPY  Daily Note, Visit # 18 :10/22/2018    ICD-10: Treatment Diagnosis:   Stiffness of left ankle, not elsewhere classified (M25.672)  Other abnormalities of gait and mobility (R26.89)  Pain in left ankle and joints of left foot (M25.572)  Effusion, left ankle (M25.472)  Precautions/Allergies:   Patient has no known allergies. Fall Risk Score: 2 (? 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 He has demonstrated slight increase in L ankle joint mobility, however it continues to be limited, as well as restrictions through achilles tendon. Gastroc/soleus demonstrate slight increase in strength but limitations in single leg stance due to decrease in ankle stability remain. Patient has returned to work as of 18 full time with restrictions (not specified by patient) as a . His job requires a lot of standing and lifting/pushing/pulling up to 30lbs.  Patient will continue to benefit from additonal skilled physical therapy 3x/wk x4wks for manual therapeutic techniques (as appropriate), therapeutic exercises and activities, neuromuscular re-education, balance training and comprehensive home exercises program to address current impairments and functional limitations to improve his functional strength with basic ADLs and work related functions. INITIAL ASSESSMENT:   Mr. Joel Prescott presents to physical therapy s/p Left ankle exostectomy, Left triple arthrodesis, subtalar joint malunion osteotomy, and Left Achilles lengthening  on 7/2/18. He demonstrates L ankle/foot effusion, stiffness, antalgic gait, decreased strength, ROM, joint mobility, and functional mobility limiting his ability to complete basic ADLs. These S/S are consistent with L ankle stiffness, effusion, and pain, and antalgic gait. Patient will benefit from skilled physical therapy for manual therapeutic techniques (as appropriate), therapeutic exercises and activities, neuromuscular re-education, balance training and comprehensive home exercises program to address current impairments and functional limitations. Matteo Russell will benefit from skilled PT (medically necessary) in order to address above deficits affecting participation in basic ADLs and overall functional tolerance. PROBLEM LIST (Impacting functional limitations):   1. Decreased Strength  2. Decreased Joint mobility/ROM  3. Decreased ADL/Functional Activities  4. Decreased Transfer Abilities  5. Decreased Ambulation Ability/Technique  6. Decreased Balance  7. Increased Pain  8. Decreased Activity Tolerance  9. Decreased [place] with Home Exercise Program INTERVENTIONS PLANNED:   1. Balance Exercise  2. Bed Mobility  3. Cold  4. Family Education  5. Gait Training  6. Heat  7. Home Exercise Program (HEP)  8. Manual Therapy  9. Neuromuscular Re-education/Strengthening  10. Range of Motion (ROM)  11. Therapeutic Activites  12. Therapeutic Exercise/Strengthening  13. Transfer Training  14. Ultrasound (US)   TREATMENT PLAN:  Effective Dates: 8/30/18 TO 10/29/2018 (60 days).   Frequency/Duration: 3 times a week for 60 Days  SHORT-TERM FUNCTIONAL GOALS: Time Frame: 4 weeks  Matteo Russell will be compliant with home exercise program within 4 weeks in order to improve active participation with management of patient's symptoms and/or functional deficits. Katrina Felton will report <=3/10 pain with walking >15 minutes in order to participate in daily exercise and daily activities (met)  Katrina Felton will be able to stand >60 minutes with <2/10 pain to feet in order to participate in household duties without issues/compromise. Katrina Felton will report being able to sleep at night without waking up secondary to foot/ankle pain. (met)  Katrina Felton  will improve ankle PF/DF active ROM from 32/0 to [place]  will improve ankle strength from 4/5 to 4+/5 to improve tolerance of ADLs. (met)  Katrina Felton will be able to ascend/descend >8 steps with reciprocal pattern to demonstrate improvement in functional mobility at home. DISCHARGE GOALS: Time Frame: 8 weeks  Katrina Felton will be independent with home exercise program within 8 weeks in order to improve independence with management of patient's symptoms and/or functional deficits. Katrina Felton will report no pain with walking affecting participation in activities of daily living. Katrina Felton will be able to stand >60 minutes without onset of foot pain. Katrina Felton will be report improved score on FAAM from 34/84 to >60/84 to improve functional independence. HISTORY:        History of Present Injury/Illness (Reason for Referral): Katrina Felton presents to physical therapy s/p L ankle/foot reconstruction approximately 8 weeks ago. He states he broke his ankle 20 years ago but never sought treatment for it. He states it healed \"crooked\" so he has been walking on the side of his foot (medial) and after a while it began to really hurt him.  He states last year he sought care for it and was placed in a boot/cast for a while to re-align his foot, however that failed so he had it surgically reconstructed and referred to PT.     -Present symptoms/complaints (on day of evaluation)   Pain Scale:  · Current: 5/10  · Best: 1/10  · Worst: 6/10    · Aggravating factors: walking long distances, going up/down steps, squatting, raising up on toes,    · Relieving factors: rest      Past Medical History/Comorbidities:   Mr. Ja Kumar  has a past medical history of Hypertension. Mr. Ja Kumar  has a past surgical history that includes hx hernia repair (1998); hx colonoscopy; LEFT ANKLE EXOSTECTOMY (Left, 7/2/2018); LEFT ANKLE ARTHRODESIS TRIPLE (Left, 7/2/2018); and LEFT ACHILLES LENGTHENING (Left, 7/2/2018). , L ankle reconstruction (2018)    Active Ambulatory Problems     Diagnosis Date Noted    No Active Ambulatory Problems     Resolved Ambulatory Problems     Diagnosis Date Noted    No Resolved Ambulatory Problems     Past Medical History:   Diagnosis Date    Hypertension      Social History/Living Environment:        Social History     Socioeconomic History    Marital status:      Spouse name: Not on file    Number of children: Not on file    Years of education: Not on file    Highest education level: Not on file   Social Needs    Financial resource strain: Not on file    Food insecurity - worry: Not on file    Food insecurity - inability: Not on file   [employer] needs - medical: Not on file   [employer] needs - non-medical: Not on file   Occupational History    Not on file   Tobacco Use    Smoking status: Never Smoker    Smokeless tobacco: Never Used   Substance and Sexual Activity    Alcohol use: No    Drug use: No    Sexual activity: Not on file   Other Topics Concern    Not on file   Social History Narrative    Not on file     Prior Level of Function/Work/Activity:  Not working  Hobbies: walking/travel  Dominant Side:         RIGHT  Other Clinical Tests  X-ray: healing good, per pt. Previous Treatment Approach   Corrective brace and surgery  Current Medications:    Current Outpatient Medications:     losartan-hydroCHLOROthiazide (HYZAAR) 100-12.5 mg per tablet, Take 1 Tab by mouth daily.  Indications: hypertension, Disp: , Rfl:      Date Last Reviewed:  10/22/2018        EXAMINATION:        Observation/Orthostatic Postural Assessment:  Assessed @ Initial Visit:      Gait Antalgic with improved toe-off in terminal stance on L, foot flat, and L hip ER    Standing; primarily WB through R LE and L in ER    Palpation:  Assessed @ Initial Visit: mild Tenderness to palpation along achilles T with restrictions noted. Tenderness and restriction through plantar fascia and talocrual joint. Surgical scars present good healing, however continued restricted in mobility/pliability                     AROM/PROM         Joint: Eval Date: 8/30/18  Re-Assess Date: 9/26/18  Re-Assess Date:    Active ROM RIGHT LEFT RIGHT LEFT RIGHT LEFT   Ankle Dorsiflexion 5 0  0     Ankle Plantarflexion 45 32  32     Ankle Inversion 6 0  1     Ankle Eversion 4 8  10                       Passive ROM         Ankle Dorsiflexion  1  4     Ankle Plantarflexion  32  32     Ankle Inversion  0  2     Ankle Eversion  8  10       Strength:     Eval Date: 8/30/18  Re-Assess Date: 9/26/18  Re-Assess Date:      RIGHT LEFT RIGHT LEFT RIGHT LEFT   Ankle Dorsiflexion 5/5 4/5  5/5     Ankle Plantarflexion 5/5 4+/5  4+/5     Ankle Inversion 5/5 4/5  4+/5     Ankle Eversion 5/5 4/5  4+/5     Knee Flexion 5/5 5/5       Knee Extension 5/5 5/5                  Joint Mobility Eval Date: 8/30/18  Re-Assess Date:  Re-Assess Date:     RIGHT LEFT RIGHT LEFT RIGHT LEFT   Subtalar  hypomobile  Hypomobile      Talocrual  hypomobile   hypomobile       Special Tests: Assessed @ Initial Visit:     Neurological Screen: Assessed @ Initial Visit No radiating symptoms down leg    Functional Mobility:  Assessed @ Initial Visit Limited tolerance of walking and standing  Heel/Toe walking= unable  Calf Raise (Dbl/Single)= Dbl unable  Balance:  Assessed @ Initial Visit   Single leg stance: R= 1-5seconds  L= 0 seconds        Body Structures Involved:  1. Bones  2. Joints  3. Muscles  4. Ligaments 5. Body Functions Affected:  1. Sensory/Pain  2. Neuromusculoskeletal  3. Movement Related Activities and Participation Affected:  1. Mobility  2. Self Care 3.  4.  5.  6.  7.    CLINICAL DECISION MAKING:         Tool Used: FOOT AND ANKLE ABILITY MEASURE (FAAM)  Score:  Initial: 34/84 Most Recent: X (Date: -- )   Interpretation of Score: For the \"Activities of Daily Living\", there are 21 questions each scored on a 5 point scale with 0 representing \"Unable to do\" and 4 representing \"No difficulty\". The lower the score, the greater the functional disability. 84/84 represents no disability. Minimal detectable change is 5.7 points. With the addition of the 8 questions in the \"Sports Subscale,\" there are 29 questions, each scored on a 5 point scale with 0 representing \"Unable to do\" and 4 representing \"No difficulty\". The lower the score, the greater the functional disability. 116/116 represents no disability. Minimal detectable change is 12.3 points. Activities of Daily Living:  Score 84 83-68 67-51 50-34 33-18 17-1 0   Modifier CH CI CJ CK CL CM CN     Activities of Daily Living + Sports Subscale:  Score 116 115-94 93-71 70-47 46-24 23-1 0   Modifier CH CI CJ CK CL CM CN     ? Mobility - Walking and Moving Around:     - CURRENT STATUS: CK - 40%-59% impaired, limited or restricted    - GOAL STATUS: CJ - 20%-39% impaired, limited or restricted    - D/C STATUS:  ---------------To be determined---------------      Medical Necessity:   · Patient is expected to demonstrate progress in strength, range of motion, balance, coordination and functional technique to increase independence with functional mobility at home to perform household duties and improve safety during ascending/descending stairs, walking on uneven ground, and squatting. .  Reason for Services/Other Comments:  · Patient requires manual therapeutic interventions as well as thereapeutic exercises/activities and gait training to improve patients saftey with ambulation in the community and functional mobility in and out of home. . ·  ·  ·  ·  ·  ·    TREATMENT:         (In addition to Assessment/Re-Assessment sessions the following treatments were rendered)      · Pre-Treatment Pain/ Symptoms: Pt states doing great and seems swelling goes down each day  0/10       THERAPEUTIC EXERCISE: (5 minutes):  Exercises per grid below to improve mobility, strength and balance. Required minimal visual and verbal cues to promote proper body alignment, promote proper body posture and promote proper body mechanics. Progressed resistance, range, repetitions and complexity of movement as indicated. Date:  9/7/18 Date  9/10/18 Date  9/12/18 Date   9/14/18 Date:  9/17/18 Date  9/19/18 Date  9/26/18 Date  9/28/18 Date  8. 3.18 10.8.18 Date  10/10/18 10/12/18 10/15/18 10/17/18 10/19/18 10/22/18   Exercise Parameters                  Education       PRICE, HEP, re-assessment    HEP progression        Plantar fascia stretch 3x30\"                  Calf stretch  3x30\" and HS stretch 3x30\" 3x30\" 3x30\" on slant board 3x30\" on slant board 3x30\" slant board 4x30\" with plantar fascia stretch 4x30\" with plantar fascia stretch   3x30\" slant board; 3x30\" bckwards for tib ant stretch   3x30\" slant board; 3x30\" bckwards for tib ant stretch 3x30\" slant board; 3x30\" bckwards for tib ant stretch 3x30\" slant board;    Towel curls 30x         NV         Seated calf raises  10x          X 10       baps board Seated 30x fwd/backward  30x side/side Seated 30x fwd/backward  30x side/side Standing 4 mins for balance fwd/backward Standing 2 mins for balance fwd/backward Standing 2 mins for balance fwd/backward  Med/lateral   Standing 2 mins for balance fwd/backward  Med/lateral   Standing 2x10 minimal UE support required today Seated PF x 20 with assist   Seated x 10 PF with 5 sec hold at end of range to work on endurance of gastroc       Plantar flex Green band 30x         With wobble board  Blue band 10x10\" PF hold and ecc release With baps board and manual resist  With manual resist x 5 R With manual resist x 5 R With blue band 10x ecc focus With blue band 15x ecc focus   Bike 10 mins level 4 10 mins  Level 7 5 minutes Level 7 5 minutes Level 6 8 min Level 7 8 mins Level 5 6 mins  nustep level 3,  8 min   nustep level 5 x 8 min nustep level 3 x 10 min  nustep level 6 10 mins nustep level 7 8 mins nustep level 7 8 mins   RDL Single leg    Picking up cone 13x Picking up cone 13x Picking up cone 13x  Picking up cone 15x           Wobble board         4 mins for ankle AROM in CKC, and 3 mins for balance fwd/bcwd Seated          Shuttle           75# 2 x 15  DL calf raise, DL squat    25# SL squat, SLcalf raise x 15 25# SL calf raise 15x  75# 2x10 DL calf raise  100# DL squat 2x10-TKE focus 125#  x 15  DL calf raise, DL squat  75# deep DL squat   25# calf raise and hold x 10 DL and x 10 SL    125# DL squat 2 x 15  50# calf raise and hold DL 30x and 25# 10x SL    125#DL squat 3x10     SLR           20x        HS stretch                      [employer] Portal    Therapeutic Activity: (23 minutes): Activity Date:  9/7/18 Date:  9/10/18 Date  9/12/18 Date:  9/14/18 Date  9/17/18 Date  9/19/18 Date  9/26/18 Date  9/28/18 10/12/18 10/15/18 10/17/18 10/19/18 10/22/18   Exercise Parameters Parameters              STS  20x L foot back for increase WB               Step overs   Cone, step-to pattern; 10ft 8x Cone, step-to pattern; 10ft 8x    Side to side lateral 8x. Cone, step-to pattern; 10ft 8x    Side to side lateral 8x. Cone, step-to pattern with SL 3\" balance; 10ft 8x    Side to side lateral 8x.   Cone, step-to pattern with SL 3\" balance; 10ft 8x  Blue foam x 10  Blue foam x 10  Blue foam x10     Cone taps 2x10 for SL balance 20x for SL balance 20x for SL balance 20x slow  25 x   Multi-direction 20x        Seated heel raises 30x 20x     15x10\"  X 10       Tight rope walking  20ft 4x 30ft 4x Heel-toe 30' Heel toe 30' Heel-toe 40ft       30ft   Lunge on step  10x10\" for tibial translation 10x10\" for tibial translation      Onto blue foam   10x10\" for tibial glide in CKC position to assist DF 10x10\" for tibial glide in CKC position to assist DF   Gait training  50ft for heel/toe and hip/knee flex       focused on push off of left using PF through gastroc/soleus continued with cues for push off  continued with cues for push off      Heel/toe walking   15ft          20ft   SL Balance rolling ball with R LE    Forward and back 10x (CGA by PT) Forward and back 10x (CGA by PT) Forward and back 10x (SBA by PT) Forward and back 20x (SBA by PT) Forward and back 20x (SBA by PT)        SLS     NV 30x3  10x5\" 3 x 30 sec on foam On blue foam x 2 min   On blue foam x 3 min with R foot on ball for support as needed On blue foam x 4 min with R foot on ball rolling fwd/bckwd On blue foam x5 mins tapping R foot to different step heights   Step ups      4 inch L SL           Fwd Lunge        Onto airex 30x Onto airex 30x On to blue foam       Calf raises            Off steps with heels off for stretch 20x                                    Off steps with heels off for stretch 20x  Neuromuscular Education:0 min to improved proprioception and ankle strategy   · Manual PF/DF resistance with isometric holds for isolated strengthening        Manual Therapy Interventions: (17 minutes): Manual interventions per grid below performed to improve joint/soft tissue mobility and ROM to improve ability to perform ADLs. Patient responded well to all manual interventions with no significant increase in pain/symptoms. Improved ankle/foot ROM demonstrated following interventions and decrease in pain scale listed below.                Manual Intervention Date:  9/5/18 Date:  9/7/18 Date  9/10/18 Date  9/12/18 9/14/18 9.17.18 9/26/18 9.28.18 10.3.18 10/8/18 10/10/18 10/12/18 10/15/18 10/17/18 10/19/18 10/22/18   Soft tissue mobilization Joint Mobility Parameters Parameters                  Talocrual A/P Grade III A/P grade III A/P grade III A/P grade III A/P grade III med/lat grade III  A/P grade III med/lat grade III  A/P grade IV med/lat grade III  A/P grade IV med/lat grade III  A/P grade IV med/lat grade III  A/P grade IV med/lat grade III P/A grade IV med/lat grade III P/A grade IV med/lat grade III P/A grade IV med/lat grade III P/A grade IV med/lat grade III P/A grade IV med/lat grade III P/A grade IV med/lat grade III-IV    Subtalar Med/lat Grade III-IV Med/lat grade III-IV Med/lst grade III-IV Med/lat grade III   Med/lat grade IV Med/lat grade IV Med/lat grade IV Med/lat grade IV Med/lat grade IV Med/lat grade IV    Med/lat grade IV   Plantar fascia   release release release               Gastroc/soleus  Strumming/release Strumming/release strumming     Strumming/release Strumming/release           anterior tib           Deep pressure for inhabition  Deep pressure for inhabition ; contract/relax stretching Deep pressure for inhabition ;  Deep pressure for inhabition ;  Deep pressure for inhabition Deep pressure for inhabition and strumming Deep pressure for inhabition and strumming                                             MODALITIES: (0 minutes):    Treatment/Session Assessment: Challenged with single leg stance (dynamic tapping to different step heights) today, however improved balance with cues for TA activation. Pt able to get minimal height with toe walking today and significant in crease in balance with tight rope walking for narrow base of support balance training. · Pain: Post Session report:  Pain: 0/10      GOOD ·   Compliance with Program/Exercises: Compliant  · Recommendations/Intent for next treatment session:Continue with reducing tibialis anterior tone as indicated and gastroc/soleus strengthening.      Future Appointments   Date Time Provider Tay Aguayo   10/24/2018  8:00 AM Coco Belcher OSNORAH [facility] 10/26/2018  8:00 AM Elizabeth Ryan [facility]       Total Treatment Duration: 45 min  PT Patient Time In/Time Out  Time In: 0805  Time Out: 3818    Jeffrey Folwers

## 2018-10-24 ENCOUNTER — HOSPITAL ENCOUNTER (OUTPATIENT)
Dept: PHYSICAL THERAPY | Age: 48
Discharge: HOME | End: 2018-10-24
Payer: COMMERCIAL

## 2018-10-24 PROCEDURE — 97110 THERAPEUTIC EXERCISES: CPT

## 2018-10-24 PROCEDURE — 97530 THERAPEUTIC ACTIVITIES: CPT

## 2018-10-24 NOTE — PROGRESS NOTES
Jyothi Dickens  : 1970      Payor: Ferd Collet / Plan: Samaria East RPN / Product Type: Commerical /  [id number] [facility], [address] [facility], [address], [facility], [address] [place] [address] Road  [phone]   [phone]        OUTPATIENT PHYSICAL THERAPY  Daily Note, Visit # 19 :10/24/2018    ICD-10: Treatment Diagnosis:   Stiffness of left ankle, not elsewhere classified (M25.672)  Other abnormalities of gait and mobility (R26.89)  Pain in left ankle and joints of left foot (M25.572)  Effusion, left ankle (M25.472)  Precautions/Allergies:   Patient has no known allergies. Fall Risk Score: 2 (? 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 He has demonstrated slight increase in L ankle joint mobility, however it continues to be limited, as well as restrictions through achilles tendon. Gastroc/soleus demonstrate slight increase in strength but limitations in single leg stance due to decrease in ankle stability remain. Patient has returned to work as of 18 full time with restrictions (not specified by patient) as a . His job requires a lot of standing and lifting/pushing/pulling up to 30lbs.  Patient will continue to benefit from additonal skilled physical therapy 3x/wk x4wks for manual therapeutic techniques (as appropriate), therapeutic exercises and activities, neuromuscular re-education, balance training and comprehensive home exercises program to address current impairments and functional limitations to improve his functional strength with basic ADLs and work related functions. INITIAL ASSESSMENT:   Mr. Kassandra Pro presents to physical therapy s/p Left ankle exostectomy, Left triple arthrodesis, subtalar joint malunion osteotomy, and Left Achilles lengthening  on 7/2/18. He demonstrates L ankle/foot effusion, stiffness, antalgic gait, decreased strength, ROM, joint mobility, and functional mobility limiting his ability to complete basic ADLs. These S/S are consistent with L ankle stiffness, effusion, and pain, and antalgic gait. Patient will benefit from skilled physical therapy for manual therapeutic techniques (as appropriate), therapeutic exercises and activities, neuromuscular re-education, balance training and comprehensive home exercises program to address current impairments and functional limitations. Maria Luz Mosley will benefit from skilled PT (medically necessary) in order to address above deficits affecting participation in basic ADLs and overall functional tolerance. PROBLEM LIST (Impacting functional limitations):   1. Decreased Strength  2. Decreased Joint mobility/ROM  3. Decreased ADL/Functional Activities  4. Decreased Transfer Abilities  5. Decreased Ambulation Ability/Technique  6. Decreased Balance  7. Increased Pain  8. Decreased Activity Tolerance  9. Decreased [place] with Home Exercise Program INTERVENTIONS PLANNED:   1. Balance Exercise  2. Bed Mobility  3. Cold  4. Family Education  5. Gait Training  6. Heat  7. Home Exercise Program (HEP)  8. Manual Therapy  9. Neuromuscular Re-education/Strengthening  10. Range of Motion (ROM)  11. Therapeutic Activites  12. Therapeutic Exercise/Strengthening  13. Transfer Training  14. Ultrasound (US)   TREATMENT PLAN:  Effective Dates: 8/30/18 TO 10/29/2018 (60 days).   Frequency/Duration: 3 times a week for 60 Days  SHORT-TERM FUNCTIONAL GOALS: Time Frame: 4 weeks  Maria Luz Mosley will be compliant with home exercise program within 4 weeks in order to improve active participation with management of patient's symptoms and/or functional deficits. Mojgan Singh will report <=3/10 pain with walking >15 minutes in order to participate in daily exercise and daily activities (met)  Mojgan Singh will be able to stand >60 minutes with <2/10 pain to feet in order to participate in household duties without issues/compromise. Mojgan Singh will report being able to sleep at night without waking up secondary to foot/ankle pain. (met)  Mojgan Singh  will improve ankle PF/DF active ROM from 32/0 to [place]  will improve ankle strength from 4/5 to 4+/5 to improve tolerance of ADLs. (met)  Mojgan Singh will be able to ascend/descend >8 steps with reciprocal pattern to demonstrate improvement in functional mobility at home. DISCHARGE GOALS: Time Frame: 8 weeks  Mojgan Singh will be independent with home exercise program within 8 weeks in order to improve independence with management of patient's symptoms and/or functional deficits. Mojgan Singh will report no pain with walking affecting participation in activities of daily living. Mojgan Singh will be able to stand >60 minutes without onset of foot pain. Mojgan Singh will be report improved score on FAAM from 34/84 to >60/84 to improve functional independence. HISTORY:        History of Present Injury/Illness (Reason for Referral): Mojgan Singh presents to physical therapy s/p L ankle/foot reconstruction approximately 8 weeks ago. He states he broke his ankle 20 years ago but never sought treatment for it. He states it healed \"crooked\" so he has been walking on the side of his foot (medial) and after a while it began to really hurt him.  He states last year he sought care for it and was placed in a boot/cast for a while to re-align his foot, however that failed so he had it surgically reconstructed and referred to PT.     -Present symptoms/complaints (on day of evaluation)   Pain Scale:  · Current: 5/10  · Best: 1/10  · Worst: 6/10    · Aggravating factors: walking long distances, going up/down steps, squatting, raising up on toes,    · Relieving factors: rest      Past Medical History/Comorbidities:   Mr. Salo Haas  has a past medical history of Hypertension. Mr. Salo Haas  has a past surgical history that includes hx hernia repair (1998); hx colonoscopy; LEFT ANKLE EXOSTECTOMY (Left, 7/2/2018); LEFT ANKLE ARTHRODESIS TRIPLE (Left, 7/2/2018); and LEFT ACHILLES LENGTHENING (Left, 7/2/2018). , L ankle reconstruction (2018)    Active Ambulatory Problems     Diagnosis Date Noted    No Active Ambulatory Problems     Resolved Ambulatory Problems     Diagnosis Date Noted    No Resolved Ambulatory Problems     Past Medical History:   Diagnosis Date    Hypertension      Social History/Living Environment:        Social History     Socioeconomic History    Marital status:      Spouse name: Not on file    Number of children: Not on file    Years of education: Not on file    Highest education level: Not on file   Social Needs    Financial resource strain: Not on file    Food insecurity - worry: Not on file    Food insecurity - inability: Not on file   [employer] needs - medical: Not on file   [employer] needs - non-medical: Not on file   Occupational History    Not on file   Tobacco Use    Smoking status: Never Smoker    Smokeless tobacco: Never Used   Substance and Sexual Activity    Alcohol use: No    Drug use: No    Sexual activity: Not on file   Other Topics Concern    Not on file   Social History Narrative    Not on file     Prior Level of Function/Work/Activity:  Not working  Hobbies: walking/travel  Dominant Side:         RIGHT  Other Clinical Tests  X-ray: healing good, per pt. Previous Treatment Approach   Corrective brace and surgery  Current Medications:    Current Outpatient Medications:     losartan-hydroCHLOROthiazide (HYZAAR) 100-12.5 mg per tablet, Take 1 Tab by mouth daily.  Indications: hypertension, Disp: , Rfl:      Date Last Reviewed:  10/24/2018        EXAMINATION:        Observation/Orthostatic Postural Assessment:  Assessed @ Initial Visit:      Gait Antalgic with improved toe-off in terminal stance on L, foot flat, and L hip ER    Standing; primarily WB through R LE and L in ER    Palpation:  Assessed @ Initial Visit: mild Tenderness to palpation along achilles T with restrictions noted. Tenderness and restriction through plantar fascia and talocrual joint. Surgical scars present good healing, however continued restricted in mobility/pliability                     AROM/PROM         Joint: Eval Date: 8/30/18  Re-Assess Date: 9/26/18  Re-Assess Date:    Active ROM RIGHT LEFT RIGHT LEFT RIGHT LEFT   Ankle Dorsiflexion 5 0  0     Ankle Plantarflexion 45 32  32     Ankle Inversion 6 0  1     Ankle Eversion 4 8  10                       Passive ROM         Ankle Dorsiflexion  1  4     Ankle Plantarflexion  32  32     Ankle Inversion  0  2     Ankle Eversion  8  10       Strength:     Eval Date: 8/30/18  Re-Assess Date: 9/26/18  Re-Assess Date:      RIGHT LEFT RIGHT LEFT RIGHT LEFT   Ankle Dorsiflexion 5/5 4/5  5/5     Ankle Plantarflexion 5/5 4+/5  4+/5     Ankle Inversion 5/5 4/5  4+/5     Ankle Eversion 5/5 4/5  4+/5     Knee Flexion 5/5 5/5       Knee Extension 5/5 5/5                  Joint Mobility Eval Date: 8/30/18  Re-Assess Date:  Re-Assess Date:     RIGHT LEFT RIGHT LEFT RIGHT LEFT   Subtalar  hypomobile  Hypomobile      Talocrual  hypomobile   hypomobile       Special Tests: Assessed @ Initial Visit:     Neurological Screen: Assessed @ Initial Visit No radiating symptoms down leg    Functional Mobility:  Assessed @ Initial Visit Limited tolerance of walking and standing  Heel/Toe walking= unable  Calf Raise (Dbl/Single)= Dbl unable  Balance:  Assessed @ Initial Visit   Single leg stance: R= 1-5seconds  L= 0 seconds        Body Structures Involved:  1. Bones  2. Joints  3. Muscles  4. Ligaments 5. Body Functions Affected:  1. Sensory/Pain  2. Neuromusculoskeletal  3. Movement Related Activities and Participation Affected:  1. Mobility  2. Self Care 3.  4.  5.  6.  7.    CLINICAL DECISION MAKING:         Tool Used: FOOT AND ANKLE ABILITY MEASURE (FAAM)  Score:  Initial: 34/84 Most Recent: X (Date: -- )   Interpretation of Score: For the \"Activities of Daily Living\", there are 21 questions each scored on a 5 point scale with 0 representing \"Unable to do\" and 4 representing \"No difficulty\". The lower the score, the greater the functional disability. 84/84 represents no disability. Minimal detectable change is 5.7 points. With the addition of the 8 questions in the \"Sports Subscale,\" there are 29 questions, each scored on a 5 point scale with 0 representing \"Unable to do\" and 4 representing \"No difficulty\". The lower the score, the greater the functional disability. 116/116 represents no disability. Minimal detectable change is 12.3 points. Activities of Daily Living:  Score 84 83-68 67-51 50-34 33-18 17-1 0   Modifier CH CI CJ CK CL CM CN     Activities of Daily Living + Sports Subscale:  Score 116 115-94 93-71 70-47 46-24 23-1 0   Modifier CH CI CJ CK CL CM CN     ? Mobility - Walking and Moving Around:     - CURRENT STATUS: CK - 40%-59% impaired, limited or restricted    - GOAL STATUS: CJ - 20%-39% impaired, limited or restricted    - D/C STATUS:  ---------------To be determined---------------      Medical Necessity:   · Patient is expected to demonstrate progress in strength, range of motion, balance, coordination and functional technique to increase independence with functional mobility at home to perform household duties and improve safety during ascending/descending stairs, walking on uneven ground, and squatting. .  Reason for Services/Other Comments:  · Patient requires manual therapeutic interventions as well as thereapeutic exercises/activities and gait training to improve patients saftey with ambulation in the community and functional mobility in and out of home. . ·  ·  ·  ·  ·  ·    TREATMENT:         (In addition to Assessment/Re-Assessment sessions the following treatments were rendered)      · Pre-Treatment Pain/ Symptoms: Pt states doing great feeing better each day  0/10       THERAPEUTIC EXERCISE: (15 minutes):  Exercises per grid below to improve mobility, strength and balance. Required minimal visual and verbal cues to promote proper body alignment, promote proper body posture and promote proper body mechanics. Progressed resistance, range, repetitions and complexity of movement as indicated. Date:  9/7/18 Date  9/10/18 Date  9/12/18 Date   9/14/18 Date:  9/17/18 Date  9/19/18 Date  9/26/18 Date  9/28/18 Date  8. 3.18 10.8.18 Date  10/10/18 10/12/18 10/15/18 10/17/18 10/19/18 10/22/18 10/24/18   Exercise Parameters                   Education       PRICE, HEP, re-assessment    HEP progression      Gait at home   Plantar fascia stretch 3x30\"                   Calf stretch  3x30\" and HS stretch 3x30\" 3x30\" 3x30\" on slant board 3x30\" on slant board 3x30\" slant board 4x30\" with plantar fascia stretch 4x30\" with plantar fascia stretch   3x30\" slant board; 3x30\" bckwards for tib ant stretch   3x30\" slant board; 3x30\" bckwards for tib ant stretch 3x30\" slant board; 3x30\" bckwards for tib ant stretch 3x30\" slant board; 3x30\" slant board   Towel curls 30x         NV          Seated calf raises  10x          X 10        baps board Seated 30x fwd/backward  30x side/side Seated 30x fwd/backward  30x side/side Standing 4 mins for balance fwd/backward Standing 2 mins for balance fwd/backward Standing 2 mins for balance fwd/backward  Med/lateral   Standing 2 mins for balance fwd/backward  Med/lateral   Standing 2x10 minimal UE support required today Seated PF x 20 with assist   Seated x 10 PF with 5 sec hold at end of range to work on endurance of gastroc        Plantar flex [employer] band 30x         With wobble board  Blue band 10x10\" PF hold and ecc release With baps board and manual resist  With manual resist x 5 R With manual resist x 5 R With blue band 10x ecc focus With blue band 15x ecc focus    Bike 10 mins level 4 10 mins  Level 7 5 minutes Level 7 5 minutes Level 6 8 min Level 7 8 mins Level 5 6 mins  nustep level 3,  8 min   nustep level 5 x 8 min nustep level 3 x 10 min  nustep level 6 10 mins nustep level 7 8 mins nustep level 7 8 mins nustep level 7 8 mins   RDL Single leg    Picking up cone 13x Picking up cone 13x Picking up cone 13x  Picking up cone 15x            Wobble board         4 mins for ankle AROM in CKC, and 3 mins for balance fwd/bcwd Seated           Shuttle           75# 2 x 15  DL calf raise, DL squat    25# SL squat, SLcalf raise x 15 25# SL calf raise 15x  75# 2x10 DL calf raise  100# DL squat 2x10-TKE focus 125#  x 15  DL calf raise, DL squat  75# deep DL squat   25# calf raise and hold x 10 DL and x 10 SL    125# DL squat 2 x 15  50# calf raise and hold DL 30x and 25# 10x SL    125#DL squat 3x10   3.5 cords dbl leg 20x, 2.5 cords single leg 20x   SLR           20x         HS stretch                       [facility] Portal    Therapeutic Activity: (30 minutes): Activity Date:  9/7/18 Date:  9/10/18 Date  9/12/18 Date:  9/14/18 Date  9/17/18 Date  9/19/18 Date  9/26/18 Date  9/28/18 10/12/18 10/15/18 10/17/18 10/19/18 10/22/18 10/24/18   Exercise Parameters Parameters               STS  20x L foot back for increase WB                Step overs   Cone, step-to pattern; 10ft 8x Cone, step-to pattern; 10ft 8x    Side to side lateral 8x. Cone, step-to pattern; 10ft 8x    Side to side lateral 8x. Cone, step-to pattern with SL 3\" balance; 10ft 8x    Side to side lateral 8x.   Cone, step-to pattern with SL 3\" balance; 10ft 8x  Blue foam x 10  Blue foam x 10  Blue foam x10      Cone taps 2x10 for SL balance 20x for SL balance 20x for SL balance 20x slow  25 x Multi-direction 20x         Seated heel raises 30x 20x     15x10\"  X 10        Tight rope walking  20ft 4x 30ft 4x Heel-toe 30' Heel toe 30' Heel-toe 40ft       30ft    Lunge on step  10x10\" for tibial translation 10x10\" for tibial translation      Onto blue foam   10x10\" for tibial glide in CKC position to assist DF 10x10\" for tibial glide in CKC position to assist DF [id number]\" for tibial glide in CKC position to assist DF   Gait training  50ft for heel/toe and hip/knee flex       focused on push off of left using PF through gastroc/soleus continued with cues for push off  continued with cues for push off       Heel/toe walking   15ft          20ft 15 ft   SL Balance rolling ball with R LE    Forward and back 10x (CGA by PT) Forward and back 10x (CGA by PT) Forward and back 10x (SBA by PT) Forward and back 20x (SBA by PT) Forward and back 20x (SBA by PT)         SLS     NV 30x3  10x5\" 3 x 30 sec on foam On blue foam x 2 min   On blue foam x 3 min with R foot on ball for support as needed On blue foam x 4 min with R foot on ball rolling fwd/bckwd On blue foam x5 mins tapping R foot to different step heights On blue foam x5 mins tapping R foot to different step heights   Step ups      4 inch L SL            Fwd Lunge        Onto airex 30x Onto airex 30x On to blue foam        Calf raises            Off steps with heels off for stretch 20x  Off steps with heels off for stretch 20x   bckward and fwd gait training              10 mins for heel/toe and TKE with gait   Hip extension              Yellow band around ankles 20x each   Off steps with heels off for stretch 20x  Neuromuscular Education:0 min to improved proprioception and ankle strategy   · Manual PF/DF resistance with isometric holds for isolated strengthening        Manual Therapy Interventions: (0 minutes): Manual interventions per grid below performed to improve joint/soft tissue mobility and ROM to improve ability to perform ADLs.  Patient responded well to all manual interventions with no significant increase in pain/symptoms. Improved ankle/foot ROM demonstrated following interventions and decrease in pain scale listed below. Manual Intervention Date:  9/5/18 Date:  9/7/18 Date  9/10/18 Date  9/12/18 9/14/18 9.17.18 9/26/18 9.28.18 10.3.18 10/8/18 10/10/18 10/12/18 10/15/18 10/17/18 10/19/18 10/22/18   Soft tissue mobilization Joint Mobility Parameters Parameters                  Talocrual A/P Grade III A/P grade III A/P grade III A/P grade III A/P grade III med/lat grade III  A/P grade III med/lat grade III  A/P grade IV med/lat grade III  A/P grade IV med/lat grade III  A/P grade IV med/lat grade III  A/P grade IV med/lat grade III P/A grade IV med/lat grade III P/A grade IV med/lat grade III P/A grade IV med/lat grade III P/A grade IV med/lat grade III P/A grade IV med/lat grade III P/A grade IV med/lat grade III-IV    Subtalar Med/lat Grade III-IV Med/lat grade III-IV Med/lst grade III-IV Med/lat grade III   Med/lat grade IV Med/lat grade IV Med/lat grade IV Med/lat grade IV Med/lat grade IV Med/lat grade IV    Med/lat grade IV   Plantar fascia   release release release               Gastroc/soleus  Strumming/release Strumming/release strumming     Strumming/release Strumming/release           anterior tib           Deep pressure for inhabition  Deep pressure for inhabition ; contract/relax stretching Deep pressure for inhabition ;  Deep pressure for inhabition ;  Deep pressure for inhabition Deep pressure for inhabition and strumming Deep pressure for inhabition and strumming                                             MODALITIES: (0 minutes):    Treatment/Session Assessment: continued challenge with single let stance taps to alternate step heights. Cues for hip ext form required. Improved backwards gait with verbal cues and improved overall forward gait pattern post backwards walking education.      · Pain: Post Session report:  Pain: 0/10 GOOD ·   Compliance with Program/Exercises: Compliant  · Recommendations/Intent for next treatment session:Continue with reducing tibialis anterior tone as indicated and gastroc/soleus strengthening.      Future Appointments   Date Time Provider Tay Aguayo   10/26/2018  8:00 AM Katrina SINGH [facility]       Total Treatment Duration: 45 min  PT Patient Time In/Time Out  Time In: 0800  Time Out: 0850    Carson Ghotra

## 2018-11-14 NOTE — THERAPY DISCHARGE
Rhiannon Pacheco  : 1970      Payor: Timothy Ramsey / Plan: Authur Scheuermann RPN / Product Type: Commerical /  [id number] at [address] [facility]. [facility], [address] [place] [address], [address] [place] [address]  [phone]   [phone]        OUTPATIENT PHYSICAL THERAPY Discharge Summary, 2018    ICD-10: Treatment Diagnosis:   Stiffness of left ankle, not elsewhere classified (M25.672)  Other abnormalities of gait and mobility (R26.89)  Pain in left ankle and joints of left foot (M25.572)  Effusion, left ankle (M25.472)  Precautions/Allergies:   Patient has no known allergies. Fall Risk Score: 2 (? 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 Treatment has been discontinued at this time due to patient failing to return for additional treatment. The below goals were met prior to discontinuation. Some goals were not met due to patient failing to return to physical therapy for completion of POC. Thank you for this referral.         Re-Assessment (18): Rhiannon Pacheco has attended 16 physical therapy sessions including initial evaluation. He has demonstrated slight increase in L ankle joint mobility, however it continues to be limited, as well as restrictions through achilles tendon. Gastroc/soleus demonstrate slight increase in strength but limitations in single leg stance due to decrease in ankle stability remain. Patient has returned to work as of 18 full time with restrictions (not specified by patient) as a .  His job requires a lot of standing and lifting/pushing/pulling up to 30lbs. Patient will continue to benefit from additonal skilled physical therapy 3x/wk x4wks for manual therapeutic techniques (as appropriate), therapeutic exercises and activities, neuromuscular re-education, balance training and comprehensive home exercises program to address current impairments and functional limitations to improve his functional strength with basic ADLs and work related functions. INITIAL ASSESSMENT:   Mr. Prabhjot Vincent presents to physical therapy s/p Left ankle exostectomy, Left triple arthrodesis, subtalar joint malunion osteotomy, and Left Achilles lengthening  on 7/2/18. He demonstrates L ankle/foot effusion, stiffness, antalgic gait, decreased strength, ROM, joint mobility, and functional mobility limiting his ability to complete basic ADLs. These S/S are consistent with L ankle stiffness, effusion, and pain, and antalgic gait. Patient will benefit from skilled physical therapy for manual therapeutic techniques (as appropriate), therapeutic exercises and activities, neuromuscular re-education, balance training and comprehensive home exercises program to address current impairments and functional limitations. .    1.  1.    TREATMENT PLAN:  Effective Dates: 8/30/18 TO 10/29/2018 (60 days). Frequency/Duration: 3 times a week for 60 Days  SHORT-TERM FUNCTIONAL GOALS: Time Frame: 4 weeks  Agustina Wan will be compliant with home exercise program within 4 weeks in order to improve active participation with management of patient's symptoms and/or functional deficits.    Agustina Wan will report <=3/10 pain with walking >15 minutes in order to participate in daily exercise and daily activities (met)  Agustina Wan will be able to stand >60 minutes with <2/10 pain to feet in order to participate in household duties without issues/compromise. (met)  Agustina Wan will report being able to sleep at night without waking up secondary to foot/ankle pain. (met)  Venetta Savage  will improve ankle PF/DF active ROM from 32/0 to [place]  will improve ankle strength from 4/5 to 4+/5 to improve tolerance of ADLs. (met)  Rebekah Walter will be able to ascend/descend >8 steps with reciprocal pattern to demonstrate improvement in functional mobility at home. DISCHARGE GOALS: Time Frame: 8 weeks  Rebekah Walter will be independent with home exercise program within 8 weeks in order to improve independence with management of patient's symptoms and/or functional deficits. Rebekah Walter will report no pain with walking affecting participation in activities of daily living. Rebekah Walter will be able to stand >60 minutes without onset of foot pain. Rebekah Walter will be report improved score on FAAM from 34/84 to >60/84 to improve functional independence.                                 Raisa Bal PT, DPT

## 2023-01-03 ENCOUNTER — OFFICE VISIT (OUTPATIENT)
Dept: ORTHOPEDIC SURGERY | Age: 53
End: 2023-01-03

## 2023-01-03 DIAGNOSIS — M19.172 POST-TRAUMATIC OSTEOARTHRITIS OF LEFT ANKLE: ICD-10-CM

## 2023-01-03 DIAGNOSIS — M25.572 LEFT ANKLE PAIN, UNSPECIFIED CHRONICITY: Primary | ICD-10-CM

## 2023-01-03 RX ORDER — BETAMETHASONE DIPROPIONATE 0.5 MG/G
CREAM TOPICAL
COMMUNITY
Start: 2022-10-10

## 2023-01-03 RX ORDER — IRBESARTAN AND HYDROCHLOROTHIAZIDE 300; 12.5 MG/1; MG/1
TABLET, FILM COATED ORAL
COMMUNITY
Start: 2022-12-27

## 2023-01-03 RX ORDER — AMLODIPINE BESYLATE 10 MG/1
10 TABLET ORAL DAILY
COMMUNITY
Start: 2022-10-05

## 2023-01-03 RX ORDER — FLUTICASONE PROPIONATE 50 MCG
2 SPRAY, SUSPENSION (ML) NASAL DAILY
COMMUNITY
Start: 2021-12-06

## 2023-01-03 RX ORDER — METHYLPREDNISOLONE ACETATE 40 MG/ML
40 INJECTION, SUSPENSION INTRA-ARTICULAR; INTRALESIONAL; INTRAMUSCULAR; SOFT TISSUE ONCE
Status: COMPLETED | OUTPATIENT
Start: 2023-01-03 | End: 2023-01-03

## 2023-01-03 RX ADMIN — METHYLPREDNISOLONE ACETATE 80 MG: 40 INJECTION, SUSPENSION INTRA-ARTICULAR; INTRALESIONAL; INTRAMUSCULAR; SOFT TISSUE at 13:39

## 2023-01-03 NOTE — PROGRESS NOTES
Patient was fitted and instructed on a Reparel Ankle Sleeve for the left foot. The patient was prescribed a Wraptor brace for the patient's leftfoot. The patient wears a size 10 shoe and I fitted the patient with a L brace. I explained how to fit the brace properly by pulling the lace tabs across top of foot first then under arch and lastly pulling the strap up firmly and attaching to the lateral Velcro strip. Thus forming a figure 8 across the ankle joint. Once the figure 8 is completed they are to secure the top (short circumferential) straps to help avoid the straps from loosening with normal wear. The patient was able to demonstrate proper fitting in office to ensure compliance with device and acknowledged satisfaction with current fit. Patient read and signed documenting they understand and agree to [facility]'s current DME return policy.

## 2023-01-03 NOTE — LETTER
DME Patient Authorization Form    Name: Hemal Saunders  : 1970  MRN: [id number]   Age: 45 y.o. Gender: male  Delivery Address: [facility]     Diagnosis:     ICD-10-CM    1. Left ankle pain, unspecified chronicity  M25.572 XR ANKLE LEFT (MIN 3 VIEWS)     XR FOOT LEFT (2 VIEWS)     Wraptor Ankle Brace ()     Reparel Ankle Sleeve ()     methylPREDNISolone acetate (DEPO-MEDROL) injection 40 mg      2. Post-traumatic osteoarthritis of left ankle  M19.172 Wraptor Ankle Brace ()     Reparel Ankle Sleeve ()     methylPREDNISolone acetate (DEPO-MEDROL) injection 40 mg           Requested DME:  Reparel Ankle Sleeve**AANKL ($30) X 1 - left  Wraptor Ankle Brace - -69 ($129.00) X 1 - left        Clinical Notes:     **Indicates non-covered items by insurance. Payment expected on date of service. Electronically signed by  Provider: Solis Mills MD__Date: 1/3/2023                            [facility]/[address] Tax ID # [id number]        Durable Medical Equipment and/or Orthotics Patient Consent     I understand that my physician has prescribed this medical supply as part of my treatment plan as a matter of Medical Necessity.  I understand that I have a choice in where I receive my prescribed orthopedic supplies and/or services.  I authorize [facility] to furnish this service/product and to provide my insurance carrier with any information requested in order to process for payment.  I instruct my insurance carrier to pay [facility] directly for these services/products.  I understand that my insurance carrier may deny payment for this supply because it is a non-covered item, deemed not medically necessary or considered experimental.   I understand that any cost not covered by my insurance carrier will be solely my financial responsibility.    I have received the Supplier Standards and have reviewed them.  I have received the prescribed item and have been fully instructed on the proper use of the above services/products.    ______ (Patient Initials) I understand that all DME items are non-returnable after being dispensed. Items still in sealed packaging may be returned up to 14 days after purchasing. [address] will replace items that are defective.    ______ (Patient Initials) I understand that [facility] will not file a claim with my insurance carrier for this service/product and I am waiving my right to file a claim on my own for this service/product with my insurance company as this item is NON-COVERED (Denoted by the **) by my Insurance company/policy. ______ (Patient Initials) I understand that I am responsible to bring my equipment to the hospital for any surgery. ______________________________________________  ________________________  Patient / Myles Patton            Thank you for considering [address] Your physician has prescribed specific medical equipment or devices for your home use. The following describes your rights and responsibilities as our customer. Right to Choose Providers: You have a choice regarding which company supplies your home medical equipment and devices, and to consult your physician in this decision. You may choose a medical supply store, a home medical equipment provider, or a specialist such as [facility]. [facility] will coordinate with your physician to provide the medical equipment or devices prescribed for your home use. Right to Service:  You have the right to considerate, respectful and nondiscriminatory care. You have the right to receive accurate and easily understood information about your health care.   If you speak a foreign language, or don't understand the discussions, assistance will be provided to allow you to make informed health care decisions. You have the right to know your treatment options and to participate in decisions about your care, including the right to accept or refuse treatment. You have the right to expect a reasonable response to your requests for treatment or service. You have the right to talk in confidence with health care providers and to have your health care information protected. You have the right to receive an explanation of your bill. You have the right to complain about the service or product you receive. Patient Responsibilities:  Please provide complete and accurate information about your health insurance benefits and make arrangements for the timely payment of your bill. [facility] will, if possible, assume responsibility for billing your insurance (Medicare, Medicaid and commercial) for the prescribed equipment or devices. If your policy does not cover the prescribed product, or only covers a portion of the bill, you are responsible for any remaining balance. Return and Exchange Policy:  [facility] will honor published  Warranties for products. [facility] will accept returns or exchanges within 14 days from the date of receipt, providin) the product must be in new condition; 2) receipt as required; and 3) used disposable and hygiene products may only be returned due to a defective product. Note: Refunds will be issued in a timely manner, please allow 4-6 weeks for processing. Complaint Procedures and DME Consumer Protection Resources:  [facility] values you as a customer, and is committed to resolving patient concerns. This commitment includes understanding and documenting your concerns, conducting a review of internal procedures, and providing you with an explanation and resolution to your concerns.   Should you have any questions about our services or billing process, please contact our office at (practice phone number). If we are unable to resolve the concern, you have the right to direct comments to the office of Consumer Protection, in the [address] or the [facility] office, without fear of repercussion. DMEPOS SUPPLIER STANDARDS    A supplier must be in compliance with all applicable Federal and [facility] and regulatory requirements. A supplier must provide complete and accurate information on the DMEPOS supplier application. Any changes to this information must be reported to the [facility] [employer] within 30 days. An authorized individual (one whose signature is binding) must sign the application for billing privileges. A supplier must fill orders from its own inventory, or must contract with other companies for the purchase of items necessary to fill the order. A supplier may not contract with any entity that is currently excluded from the Medicare program, any [facility] program, or from any other Federal procurement or Nonprocurement programs. A supplier must advise beneficiaries that they may rent or purchase inexpensive or routinely purchased durable medical equipment, and of the purchase option for capped rental equipment. A supplier must notify beneficiaries of warranty coverage and honor all warranties under applicable State Law, and repair or replace free of charge Medicare covered items that are under warranty. A supplier must maintain a physical facility on an appropriate site. A supplier must permit CMS, or its agents to conduct on-site inspections to ascertain the supplier's compliance with these standards. The supplier location must be accessible to beneficiaries during reasonable business hours, and must maintain a visible sign and posted hours of operation.   A supplier must maintain a primary business telephone listed under the name of the business in a [employer] or a toll free number available through [employer] assistance. The exclusive use of a beeper, answering machine or cell phone is prohibited. A supplier must have comprehensive liability insurance in the amount of at least $300,000 that covers both the supplier's place of business and all customers and employees of the supplier. If the supplier manufactures its own items, this insurance must also cover product liability and completed operations. A supplier must agree not to initiate telephone contact with beneficiaries, with a few exceptions allowed. This standard prohibits suppliers from calling beneficiaries in order to solicit new business. A supplier is responsible for delivery and must instruct beneficiaries on use of Medicare covered items, and maintain proof of delivery. A supplier must answer questions, and respond to complaints of the beneficiaries, and maintain documentation of such contacts. A supplier must maintain and replace at no charge or repair directly, or through a service contract with another company, Medicare covered items it has rented to beneficiaries. A supplier must accept returns of substandard (less than full quality for the particular item) or unsuitable items (inappropriate for the beneficiary at the time it was fitted and rented or sold) from beneficiaries. A supplier must disclose these supplier standards to each beneficiary to whom it supplies a Medicare-covered item. A supplier must disclose to the government any person having ownership, financial, or control interest in the supplier. A supplier must not convey or reassign a supplier number; i.e., the supplier may not sell or allow another entity to use its Medicare billing number. A supplier must have a complaint resolution protocol established to address beneficiary complaints that relate to these standards. A record of these complaints must be maintained at the physical facility.   Complaint records must include: the name, address, telephone number and health insurance claim number of the beneficiary, a summary of the complaint, and any action taken to resolve it. A supplier must agree to furnish CMS any information required by the Medicare statute and implementing regulations. A supplier of DMEPOS and other items and services must be accredited by a CMS-approved accreditation organization in order to receive and retain a supplier billing number. The accreditation must indicate the specific products and services, for which the supplier is accredited in order for the supplier to receive payment for those specific products and services. A DMEPOS supplier must notify their accreditation organization when a new DMEPOS location is opened. The accreditation organization may accredit the new supplier location for three months after it is operational without requiring a new site visit. All DMEPOS supplier locations, whether owned or subcontracted, must meet the [employer] and be separately accredited in order to bill Medicare. An accredited supplier may be denied enrollment or their enrollment may be revoked, if CMS determines that they are not in compliance with the DMEPOS quality standards. A DMEPOS supplier must disclose upon enrollment all products and services, including the addition of new product lines for which they are seeking accreditation. If a new product line is added after enrollment, the DMEPOS supplier will be responsible for notifying the accrediting body of the new product so that the DMEPOS supplier can be re-surveyed and accredited for these new products. Must meet the surety bond requirements specified in 42 C. F.R. 424.57(c). Implementation date- May 4, 2009. A supplier must obtain oxygen from a state-licensed oxygen supplier. A supplier must maintain ordering and referring documentation consistent with provisions found in 42 C. F.R. 424.516(f).   DMEPOS suppliers are prohibited from sharing a practice location with certain other Medicare providers and suppliers. DMEPOS suppliers must remain open to the public for a minimum of 30 hours per week with certain exceptions.

## 2023-01-03 NOTE — LETTER
[employer]  Arthritis oral choices: Individual Turmeric-Curcumin; [place] Banuelos; Boswellia                           -or-   Combination [employer] Turmeric strength for joints that includes all 3 listed above     Magne topical Sports:   Balm or liquid Spray with frankincense/myrrh      Nerve medication options:   CBD, Alpha Lipoic acid, Lion's brian and any other recommended neuropathic medication            Immune/healing possibilities:  Echinacea, Elderberry    As Needed: Dead sea bath salts, Essential Oils, scar cream, Gout and cramping medications    The above list of recommended medications is only a starting point. Please allow the experts at [facility] to make the final recommendations. Before using any of these medications, please make sure that you have no concerns, senstivities or allergies to the listed ingredients in each bottle/container. Also, please check with your pharmacist or your primary care physician regarding any and all possible interactions with your other daily medications. [facility] Locations:   [address] [place] [address], [address] [place] [address]            Sincerely,      Carlyle Chery MD

## 2023-01-03 NOTE — PROGRESS NOTES
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 He has never used smokeless tobacco.     Physical Examination:  Patient appears to be alert and oriented with acceptable appearance. No obvious distress or SOB  CV: appears to have acceptable vascular color and capillary refill  Neuro: appears to have mostly intact light touch sensation   Skin: Left ankle and hindfoot thickening  MS: Standing: Pes planus: Gait acceptable  Left = anterior medial ankle pain; no hindfoot pain  Left = acceptable ankle motion; expected rigid hindfoot    XR: Left side: Standing AP lateral mortise ankle plus AP oblique foot taken today with posttraumatic ankle arthritis; ankle impingement; healed triple arthrodesis with retained hardware; prior ankle heterotopic ossification  XR Impression:  As above      Reviewed Test/Records/Documents:   07/02/2018: Left ankle exostectomy: Triple arthrodesis: Subtalar joint malunion osteotomy: MOIRA  07/02/2018: Documented rigid hindfoot after autofusion of subtalar joints from ankle injury 20 years prior. Deformity has eroded his entire lateral talar process  10/24/2022: MsDaren Damien Oviedo NP, reflects arthritis left ankle     Injection: We discussed risk complication decided proceed with injection.   After sterile prep, left anterior medial ankle injected with 2 cc Xylocaine and 80 mg Depo-Medrol    Assessment:    Left posttraumatic ankle arthritis/pain  Left solid triple arthrodesis/calcaneal osteotomy prior posttraumatic trauma deformity    Plan:   The patient and I discussed the above assessment. We explored treatment options. His triple arthrodesis, hindfoot malalignment surgery look acceptable stable and fused   Due to his prior trauma, his posttraumatic ankle arthritis has increased  We discussed future surgery, but at this time we will try an injection and bracing  Advanced medical imaging: Left ankle CT scan: Potential future    DME: Reparel sleeve: Lace up ankle brace  We discussed ankle care and brace protection  PT: Not applicable  Orthotic/prosthetic: Future possible custom [place] brace       Medication - OTC meds prn: Prescribed: Boswellia, Devil's claw, Turmeric-curcumin   Magne sports topical rub with frankincense and myrrh      Surgical discussion: Discussed future surgery: Discussed ankle fusion versus ankle replacement with risk and complications. Due to his job, ankle fusion more likely outcome  Follow up: 3-4 weeks  Work status: Regular at this time but may need permanent restrictions    This note was created using Dragon voice recognition software which may result in errors of speech and spelling recognition and word/phrase syntax errors.

## 2023-01-24 ENCOUNTER — TELEPHONE (OUTPATIENT)
Dept: ORTHOPEDIC SURGERY | Age: 53
End: 2023-01-24

## 2023-01-24 NOTE — TELEPHONE ENCOUNTER
Called pt to let them know that MET is sick today and LM to please call the office back to reschedule their apt. for today.

## 2023-01-31 ENCOUNTER — TELEPHONE (OUTPATIENT)
Dept: ORTHOPEDIC SURGERY | Age: 53
End: 2023-01-31

## 2023-02-14 ENCOUNTER — TELEPHONE (OUTPATIENT)
Dept: ORTHOPEDIC SURGERY | Age: 53
End: 2023-02-14

## 2023-02-21 ENCOUNTER — TELEPHONE (OUTPATIENT)
Dept: ORTHOPEDIC SURGERY | Age: 53
End: 2023-02-21

## 2023-08-06 ENCOUNTER — HOSPITAL ENCOUNTER (EMERGENCY)
Age: 53
Discharge: HOME | End: 2023-08-06
Attending: EMERGENCY MEDICINE
Payer: COMMERCIAL

## 2023-08-06 ENCOUNTER — APPOINTMENT (OUTPATIENT)
Dept: GENERAL RADIOLOGY | Age: 53
End: 2023-08-06
Payer: COMMERCIAL

## 2023-08-06 VITALS
SYSTOLIC BLOOD PRESSURE: 144 MMHG | BODY MASS INDEX: 35.36 KG/M2 | WEIGHT: 220 LBS | TEMPERATURE: 98.6 F | RESPIRATION RATE: 18 BRPM | HEART RATE: 74 BPM | HEIGHT: 66 IN | DIASTOLIC BLOOD PRESSURE: 87 MMHG | OXYGEN SATURATION: 99 %

## 2023-08-06 DIAGNOSIS — M51.36 DDD (DEGENERATIVE DISC DISEASE), LUMBAR: ICD-10-CM

## 2023-08-06 DIAGNOSIS — S39.012A STRAIN OF LUMBAR REGION, INITIAL ENCOUNTER: Primary | ICD-10-CM

## 2023-08-06 LAB
APPEARANCE UR: CLEAR
BILIRUB UR QL: NEGATIVE
COLOR UR: YELLOW
GLUCOSE UR STRIP.AUTO-MCNC: NEGATIVE MG/DL
HGB UR QL STRIP: NEGATIVE
KETONES UR QL STRIP.AUTO: NEGATIVE MG/DL
LEUKOCYTE ESTERASE UR QL STRIP.AUTO: NEGATIVE
NITRITE UR QL STRIP.AUTO: NEGATIVE
PH UR STRIP: 5.5 (ref 5–9)
PROT UR STRIP-MCNC: NEGATIVE MG/DL
SP GR UR REFRACTOMETRY: >=1.03 (ref 1–1.02)
UROBILINOGEN UR QL STRIP.AUTO: 2 EU/DL (ref 0.2–1)

## 2023-08-06 PROCEDURE — 81003 URINALYSIS AUTO W/O SCOPE: CPT

## 2023-08-06 PROCEDURE — 99284 EMERGENCY DEPT VISIT MOD MDM: CPT

## 2023-08-06 PROCEDURE — 72100 X-RAY EXAM L-S SPINE 2/3 VWS: CPT

## 2023-08-06 RX ORDER — DICLOFENAC POTASSIUM 50 MG/1
50 TABLET, FILM COATED ORAL 3 TIMES DAILY PRN
Qty: 20 TABLET | Refills: 0 | Status: SHIPPED | OUTPATIENT
Start: 2023-08-06

## 2023-08-06 RX ORDER — LIDOCAINE 50 MG/G
1 PATCH TOPICAL DAILY
Qty: 30 PATCH | Refills: 0 | Status: SHIPPED | OUTPATIENT
Start: 2023-08-06 | End: 2023-09-05

## 2023-08-06 RX ORDER — METHOCARBAMOL 500 MG/1
500 TABLET, FILM COATED ORAL 3 TIMES DAILY PRN
Qty: 30 TABLET | Refills: 0 | Status: SHIPPED | OUTPATIENT
Start: 2023-08-06 | End: 2023-08-16

## 2023-08-06 ASSESSMENT — ENCOUNTER SYMPTOMS
BACK PAIN: 1
EYES NEGATIVE: 1
ABDOMINAL SWELLING: 0
RESPIRATORY NEGATIVE: 1
ALLERGIC/IMMUNOLOGIC NEGATIVE: 1
GASTROINTESTINAL NEGATIVE: 1
ABDOMINAL PAIN: 0
BOWEL INCONTINENCE: 0

## 2023-08-06 ASSESSMENT — PAIN - FUNCTIONAL ASSESSMENT: PAIN_FUNCTIONAL_ASSESSMENT: 0-10

## 2023-08-06 ASSESSMENT — PAIN DESCRIPTION - ORIENTATION: ORIENTATION: LOWER

## 2023-08-06 ASSESSMENT — LIFESTYLE VARIABLES
HOW MANY STANDARD DRINKS CONTAINING ALCOHOL DO YOU HAVE ON A TYPICAL DAY: PATIENT DOES NOT DRINK
HOW OFTEN DO YOU HAVE A DRINK CONTAINING ALCOHOL: NEVER

## 2023-08-06 ASSESSMENT — PAIN DESCRIPTION - LOCATION: LOCATION: BACK

## 2023-08-06 ASSESSMENT — PAIN SCALES - GENERAL: PAINLEVEL_OUTOF10: 9

## 2023-08-06 NOTE — ED TRIAGE NOTES
Pt ambulatory to triage with spouse. Pt c/o lower back pain/spasms x5 days. Denies any known injury or urinary pain.

## 2023-08-06 NOTE — DISCHARGE INSTRUCTIONS
Use warm, moist heat to area, massage, gentle range of motion and stretching to area. Use the medication as directed, ED if worse. I will refer to Ortho and a chiropractor for follow up if needed. Also, follow up with PCP for recheck.

## 2023-08-06 NOTE — ED PROVIDER NOTES
Never        Discharge Medication List as of 8/6/2023  7:31 PM        CONTINUE these medications which have NOT CHANGED    Details   amLODIPine (NORVASC) 10 MG tablet Take 10 mg by mouth dailyHistorical Med      betamethasone dipropionate 0.05 % cream Historical Med      diclofenac sodium (VOLTAREN) 1 % GEL Apply 1-2 g topically 3 times daily, Topical, 3 TIMES DAILY Starting Mon 10/24/2022, Historical Med      fluticasone (FLONASE) 50 MCG/ACT nasal spray 2 sprays by Nasal route dailyHistorical Med      irbesartan-hydroCHLOROthiazide (AVALIDE) 300-12.5 MG per tablet Historical Med              Results for orders placed or performed during the hospital encounter of 08/06/23   XR LUMBAR SPINE (2-3 VIEWS)    Narrative    XR LUMBAR SPINE (2-3 VIEWS)  8/6/2023 6:45 PM  Number of views: 3    History: Low back pain  Comparison: None    Findings:    1. No acute fracture. 2.  No malalignment. 3.  There is mild intervertebral disc height loss and osteophytosis at L4-L5 and   L5-S1, with associated lower lumbar facet arthropathy. 4.  Regional soft tissues are unremarkable. Impression    Impression:     No acute fracture or malalignment. Lower lumbar degenerative changes as   described above. Thank you for the referral of this patient. This exam was interpreted by an   American Board of [employer] certified radiologist with subspecialty fellowship   in [address] [place] [address]. If there are any questions regarding this exam please feel   free to contact us directly at [phone].         Dong Telles   8/6/2023 7:17:00 PM   Urinalysis w rflx microscopic   Result Value Ref Range    Color, UA YELLOW      Appearance CLEAR      Specific Gravity, UA >=1.030 1.001 - 1.023    pH, Urine 5.5 5.0 - 9.0      Protein, UA Negative NEG mg/dL    Glucose, UA Negative mg/dL    Ketones, Urine Negative NEG mg/dL    Bilirubin Urine Negative NEG      Blood, Urine Negative NEG      Urobilinogen, Urine 2.0 (H) 0.2 - 1.0 EU/dL    Nitrite, Urine

## 2023-08-11 ENCOUNTER — OFFICE VISIT (OUTPATIENT)
Dept: ORTHOPEDIC SURGERY | Age: 53
End: 2023-08-11
Payer: COMMERCIAL

## 2023-08-11 VITALS — BODY MASS INDEX: 35.36 KG/M2 | HEIGHT: 66 IN | WEIGHT: 220 LBS

## 2023-08-11 DIAGNOSIS — M51.36 DDD (DEGENERATIVE DISC DISEASE), LUMBAR: Primary | ICD-10-CM

## 2023-08-11 DIAGNOSIS — M54.50 LOW BACK PAIN, UNSPECIFIED BACK PAIN LATERALITY, UNSPECIFIED CHRONICITY, UNSPECIFIED WHETHER SCIATICA PRESENT: ICD-10-CM

## 2023-08-11 PROCEDURE — 99204 OFFICE O/P NEW MOD 45 MIN: CPT | Performed by: ORTHOPAEDIC SURGERY

## 2023-08-11 RX ORDER — IBUPROFEN 800 MG/1
800 TABLET ORAL EVERY 8 HOURS PRN
Qty: 90 TABLET | Refills: 2 | Status: SHIPPED | OUTPATIENT
Start: 2023-08-11

## 2023-08-11 NOTE — PROGRESS NOTES
[facility]  Consultation Note    Patient ID:  Name: Ravindra Contreras  MRN: [id number]  AGE: 52 y.o.  : 1970    Date of Consultation:  2023    CC:   Chief Complaint   Patient presents with    New Patient    Lower Back Pain         HPI: This is a new patient visit on Ravindra Contreras, he is a 78-year-old white male who is complaining of low back pain for about 10 days without any injury it does seem to be getting better. Apparently he went to the emergency room and they gave him some p.o. steroids that seems to really help. He is also got some muscle relaxers that do not seem to do much. He is really not having any radicular leg pain or numbness tingling but its uncomfortable for him to do a lot of his daily activities because of his back including being on his feet and doing a lot of walking. His pain comes and goes and he is not done any physical therapy or taken any anti-inflammatory medication. .     Past Medical History Includes: He is not a diabetic and does not have heart or lung disease and is not on blood thinners. He has had left ankle surgery is not really clear what was done but I think his foot turns out and affects his walking. Family History: No family history on file. Social History:   Social History     Tobacco Use    Smoking status: Never    Smokeless tobacco: Never   Substance Use Topics    Alcohol use: Never       ALLERGIES: No Known Allergies     Patient Medications    Current Outpatient Medications   Medication Sig    methocarbamol (ROBAXIN) 500 MG tablet Take 1 tablet by mouth 3 times daily as needed (Muscle spasm)    diclofenac (CATAFLAM) 50 MG tablet Take 1 tablet by mouth 3 times daily as needed for Pain (With food)    lidocaine (LIDODERM) 5 % Place 1 patch onto the skin daily 12 hours on, 12 hours off.     amLODIPine (NORVASC) 10 MG tablet Take 10 mg by mouth daily    betamethasone dipropionate 0.05 % cream     diclofenac sodium (VOLTAREN) 1 %

## 2023-08-29 ENCOUNTER — HOSPITAL ENCOUNTER (OUTPATIENT)
Dept: PHYSICAL THERAPY | Age: 53
Setting detail: RECURRING SERIES
Discharge: HOME | End: 2023-09-01
Attending: ORTHOPAEDIC SURGERY
Payer: COMMERCIAL

## 2023-08-29 PROCEDURE — 97162 PT EVAL MOD COMPLEX 30 MIN: CPT

## 2023-08-29 PROCEDURE — 97110 THERAPEUTIC EXERCISES: CPT

## 2023-08-29 NOTE — THERAPY EVALUATION
House       Prior Level of Function/Work/Activity:   Prior level of function: Independent  Current level of function: independent  Occupation: Full time employment         Learning:   Does the patient/guardian have any barriers to learning?: No barriers  Will there be a co-learner?: No  Does the co-learner have any barriers to learning?: No barriers  What is the preferred language of the patient/guardian?: English  What is the preferred language of the co-learner?: English  Is an  required?: No  How does the co-learner prefer to learn new concepts?: Listening; Demonstration       Fall Risk Scale: Anand Total Score: 0  Anand Fall Risk: Low (0-24)              OBJECTIVE      Observation/Orthostatic Postural Assessment:          Posture:  forward head and shoulders, anterior pelvic tilt with hip flexor tightness  Hamstrings extensibility-  moderate tightness  Hip flexor extensibility-  moderate tightness  Quad extensibility- mild tightness  JOMAR: moderate tightness    Palpation:    Joint mobility-  hypomobility noted with all PA mobs in lumbar spine but no pain    ROM:            LUMBAR SPINE % ROM Symptoms   AROM     Flexion   Extension  Right Rotation  Left Rotation  Right Sidebend  Left Sidebend 50 %  60 %   75 %  60 %  70 %  60 % All pain free but feeling of tightness throughout     Hip PROM: moderate tightness throughout but no pain    Strength:          LE-  4+/5 grossly  Special Tests:    SLR-  negative, but with significant hamstring tightness   SIJ Sacral spring-  no pain  Functional Mobility: WNL              ASSESSMENT   Initial Assessment:  Pt presents with intermittent LBP that limits his ability to walk and sit for long periods. He demonstrates significant limitations in LE and spinal flexibility/ROM as well as core weakness. Problem List: (Impacting functional limitations):     Body Structures, Functions, Activity Limitations Requiring Skilled Therapeutic Intervention: Decreased ROM;

## 2023-08-29 NOTE — PROGRESS NOTES
Roland Jackson  : 1970  Primary: [employer] ([employer])  Secondary:  [address] [facility]  [address]  [address] ,  [address] [place] [address]  Phone: [phone]  Fax: [phone] Plan Frequency: 2x/wk    Plan of Care/Certification Expiration Date: 23      >PT Visit Info:  Plan Frequency: 2x/wk  Plan of Care/Certification Expiration Date: 23      Visit Count:  1    OUTPATIENT PHYSICAL THERAPY:OP NOTE TYPE: OP Note Type: Treatment Note 2023       Episode  }Appt Desk             Treatment Diagnosis:  Other Low Back Pain (M54.59)  Abnormal posture (R29.3)  Medical/Referring Diagnosis:  DDD (degenerative disc disease), lumbar [M51.36]  Low back pain, unspecified back pain laterality, unspecified chronicity, unspecified whether sciatica present [M54.50]  Referring Physician:  Laveta Kawasaki, MD MD Orders:  PT Eval and Treat   Date of Onset:  Onset Date: 23     Allergies:   Patient has no known allergies. Restrictions/Precautions:  No data recordedNo data recorded     Interventions Planned (Treatment may consist of any combination of the following):    Current Treatment Recommendations: Strengthening; ROM; Functional mobility training; Manual; Pain management; Home exercise program; Dry needling; Modalities     >Subjective Comments: see eval note     >Initial:   0   /10>Post Session:     0   /10  Medications Last Reviewed:  2023  Updated Objective Findings:  See evaluation note from today  Treatment   THERAPEUTIC EXERCISE: (25 minutes):    Exercises per grid below to improve mobility and strength. Required moderate visual, verbal, manual, and tactile cues to promote proper body alignment, promote proper body posture, promote proper body mechanics, promote proper body breathing techniques, and proper technique . Progressed resistance, range, repetitions, and complexity of movement as indicated.    Date:  23   Activity/Exercise Parameters   Supine lumbar

## 2023-08-31 ENCOUNTER — HOSPITAL ENCOUNTER (OUTPATIENT)
Dept: PHYSICAL THERAPY | Age: 53
Setting detail: RECURRING SERIES
End: 2023-08-31
Attending: ORTHOPAEDIC SURGERY
Payer: COMMERCIAL

## 2023-08-31 PROCEDURE — 97140 MANUAL THERAPY 1/> REGIONS: CPT

## 2023-08-31 PROCEDURE — 97110 THERAPEUTIC EXERCISES: CPT

## 2023-08-31 NOTE — PROGRESS NOTES
Jacque Cabot  : 1970  Primary: [employer] ([employer])  Secondary:  [address] [facility]  [address]  [address],  [address] [place] [address]  Phone: [phone]  Fax: [phone] Plan Frequency: 2x/wk    Plan of Care/Certification Expiration Date: 23      >PT Visit Info:  Plan Frequency: 2x/wk  Plan of Care/Certification Expiration Date: 23      Visit Count:  2    OUTPATIENT PHYSICAL THERAPY:OP NOTE TYPE: OP Note Type: Treatment Note 2023       Episode  }Appt Desk             Treatment Diagnosis:  Other Low Back Pain (M54.59)  Abnormal posture (R29.3)  Medical/Referring Diagnosis:  DDD (degenerative disc disease), lumbar [M51.36]  Low back pain, unspecified back pain laterality, unspecified chronicity, unspecified whether sciatica present [M54.50]  Referring Physician:  Ann Hurley MD MD Orders:  PT Eval and Treat   Date of Onset:  Onset Date: 23     Allergies:   Patient has no known allergies. Restrictions/Precautions:  No data recordedNo data recorded     Interventions Planned (Treatment may consist of any combination of the following):    Current Treatment Recommendations: Strengthening; ROM; Functional mobility training; Manual; Pain management; Home exercise program; Dry needling; Modalities     >Subjective Comments: Pt reports his back is doing well and the exercises are going well. >Initial:   0   /10>Post Session:     0   /10  Medications Last Reviewed:  2023  Updated Objective Findings:    Treatment   THERAPEUTIC EXERCISE: (43 minutes):    Exercises per grid below to improve mobility and strength. Required moderate visual, verbal, manual, and tactile cues to promote proper body alignment, promote proper body posture, promote proper body mechanics, promote proper body breathing techniques, and proper technique . Progressed resistance, range, repetitions, and complexity of movement as indicated.    Date:  23   Activity/Exercise

## (undated) DEVICE — SUTURE MCRYL SZ 3-0 L27IN ABSRB UD L24MM PS-1 3/8 CIR PRIM Y936H

## (undated) DEVICE — PRECISION THIN, OFFSET (5.5 X 0.38 X 25.0MM)

## (undated) DEVICE — STERILE HOOK LOCK LATEX FREE ELASTIC BANDAGE 6INX5YD: Brand: HOOK LOCK™

## (undated) DEVICE — STRETCH BANDAGE ROLL: Brand: DERMACEA

## (undated) DEVICE — (D)PREP SKN CHLRAPRP APPL 26ML -- CONVERT TO ITEM 371833

## (undated) DEVICE — SUTURE VCRL SZ 0 L27IN ABSRB UD L36MM CT-1 1/2 CIR J260H

## (undated) DEVICE — DISPOSABLE TOURNIQUET CUFF SINGLE BLADDER, DUAL PORT AND LUER LOCK CONNECTOR: Brand: COLOR CUFF

## (undated) DEVICE — SPLINT CNFRM 5X30IN FBRGLS WHT -- USE ITEM 190871

## (undated) DEVICE — MASTISOL ADHESIVE LIQ 2/3ML

## (undated) DEVICE — REM POLYHESIVE ADULT PATIENT RETURN ELECTRODE: Brand: VALLEYLAB

## (undated) DEVICE — ABDOMINAL PAD: Brand: DERMACEA

## (undated) DEVICE — Device

## (undated) DEVICE — PADDING CAST W4INXL4YD ST COT COHESIVE HND TEARABLE SPEC

## (undated) DEVICE — BIT DRL DIA3.2MM CANN FOR 4.5MM SCR

## (undated) DEVICE — SOLUTION IV 1000ML 0.9% SOD CHL

## (undated) DEVICE — BUTTON SWITCH PENCIL BLADE ELECTRODE, HOLSTER: Brand: EDGE

## (undated) DEVICE — DRAPE TWL SURG 16X26IN BLU ORB04] ALLCARE INC]

## (undated) DEVICE — STERILE HOOK LOCK LATEX FREE ELASTIC BANDAGE 4INX5YD: Brand: HOOK LOCK™

## (undated) DEVICE — AMD ANTIMICROBIAL GAUZE SPONGES,12 PLY USP TYPE VII, 0.2% POLYHEXAMETHYLENE BIGUANIDE HCI (PHMB): Brand: CURITY

## (undated) DEVICE — 2000CC GUARDIAN II: Brand: GUARDIAN

## (undated) DEVICE — AMD ANTIMICROBIAL BANDAGE ROLL,6 PLY: Brand: KERLIX

## (undated) DEVICE — BANDAGE,GAUZE,CONFORMING,6"X80",STRL,LF: Brand: MEDLINE

## (undated) DEVICE — CARDINAL HEALTH FLEXIBLE LIGHT HANDLE COVER: Brand: CARDINAL HEALTH

## (undated) DEVICE — PADDING CAST W6INXL4YD POLY POR SPUN DACRON SYN VERSATILE

## (undated) DEVICE — PADDING CAST W6INXL4YD ST COT COHESIVE HND TEARABLE SPEC

## (undated) DEVICE — SPLINT MAT XF SPEC 5X30IN --

## (undated) DEVICE — (D)STRIP SKN CLSR 0.5X4IN WHT --

## (undated) DEVICE — SUT ETHLN 3-0 18IN PS1 BLK --

## (undated) DEVICE — NON-ADHERING DRESSING: Brand: ADAPTIC®

## (undated) DEVICE — GOWN,REINF,POLY,ECL,PP SLV,XL: Brand: MEDLINE